# Patient Record
Sex: FEMALE | Race: WHITE | Employment: UNEMPLOYED | URBAN - METROPOLITAN AREA
[De-identification: names, ages, dates, MRNs, and addresses within clinical notes are randomized per-mention and may not be internally consistent; named-entity substitution may affect disease eponyms.]

---

## 2017-01-05 ENCOUNTER — TRANSFERRED RECORDS (OUTPATIENT)
Dept: HEALTH INFORMATION MANAGEMENT | Facility: CLINIC | Age: 47
End: 2017-01-05

## 2017-01-18 ENCOUNTER — OFFICE VISIT (OUTPATIENT)
Dept: FAMILY MEDICINE | Facility: CLINIC | Age: 47
End: 2017-01-18
Payer: COMMERCIAL

## 2017-01-18 ENCOUNTER — TELEPHONE (OUTPATIENT)
Dept: FAMILY MEDICINE | Facility: CLINIC | Age: 47
End: 2017-01-18

## 2017-01-18 VITALS
HEIGHT: 68 IN | WEIGHT: 167.2 LBS | TEMPERATURE: 96.3 F | SYSTOLIC BLOOD PRESSURE: 120 MMHG | DIASTOLIC BLOOD PRESSURE: 70 MMHG | BODY MASS INDEX: 25.34 KG/M2 | HEART RATE: 67 BPM | OXYGEN SATURATION: 97 %

## 2017-01-18 DIAGNOSIS — B00.1 RECURRENT COLD SORES: ICD-10-CM

## 2017-01-18 DIAGNOSIS — J02.9 SORE THROAT: ICD-10-CM

## 2017-01-18 DIAGNOSIS — R20.2 PARESTHESIAS: ICD-10-CM

## 2017-01-18 DIAGNOSIS — R68.2 DRY MOUTH, UNSPECIFIED: ICD-10-CM

## 2017-01-18 DIAGNOSIS — F33.1 MAJOR DEPRESSIVE DISORDER, RECURRENT EPISODE, MODERATE (H): Primary | ICD-10-CM

## 2017-01-18 DIAGNOSIS — F33.1 MAJOR DEPRESSIVE DISORDER, RECURRENT EPISODE, MODERATE (H): ICD-10-CM

## 2017-01-18 DIAGNOSIS — F90.9 ATTENTION DEFICIT HYPERACTIVITY DISORDER (ADHD), UNSPECIFIED ADHD TYPE: Primary | ICD-10-CM

## 2017-01-18 LAB
DEPRECATED S PYO AG THROAT QL EIA: NORMAL
MICRO REPORT STATUS: NORMAL
SPECIMEN SOURCE: NORMAL

## 2017-01-18 PROCEDURE — 87081 CULTURE SCREEN ONLY: CPT | Performed by: FAMILY MEDICINE

## 2017-01-18 PROCEDURE — 87880 STREP A ASSAY W/OPTIC: CPT | Performed by: FAMILY MEDICINE

## 2017-01-18 PROCEDURE — 99214 OFFICE O/P EST MOD 30 MIN: CPT | Performed by: FAMILY MEDICINE

## 2017-01-18 RX ORDER — IBUPROFEN 800 MG/1
800 TABLET, FILM COATED ORAL EVERY 8 HOURS PRN
Qty: 30 TABLET | Refills: 1 | Status: CANCELLED | OUTPATIENT
Start: 2017-01-18

## 2017-01-18 RX ORDER — DEXTROAMPHETAMINE SACCHARATE, AMPHETAMINE ASPARTATE, DEXTROAMPHETAMINE SULFATE AND AMPHETAMINE SULFATE 5; 5; 5; 5 MG/1; MG/1; MG/1; MG/1
20 TABLET ORAL 2 TIMES DAILY
Qty: 60 TABLET | Refills: 0 | Status: SHIPPED | OUTPATIENT
Start: 2017-03-18 | End: 2017-04-17

## 2017-01-18 RX ORDER — GABAPENTIN 100 MG/1
200 CAPSULE ORAL 3 TIMES DAILY
Qty: 180 CAPSULE | Refills: 0 | Status: SHIPPED
Start: 2017-01-18 | End: 2017-10-11

## 2017-01-18 RX ORDER — DEXTROAMPHETAMINE SACCHARATE, AMPHETAMINE ASPARTATE, DEXTROAMPHETAMINE SULFATE AND AMPHETAMINE SULFATE 5; 5; 5; 5 MG/1; MG/1; MG/1; MG/1
20 TABLET ORAL 2 TIMES DAILY
Qty: 60 TABLET | Refills: 0 | Status: SHIPPED | OUTPATIENT
Start: 2017-02-18 | End: 2017-03-20

## 2017-01-18 RX ORDER — DEXTROAMPHETAMINE SACCHARATE, AMPHETAMINE ASPARTATE, DEXTROAMPHETAMINE SULFATE AND AMPHETAMINE SULFATE 5; 5; 5; 5 MG/1; MG/1; MG/1; MG/1
20 TABLET ORAL 2 TIMES DAILY
Qty: 60 TABLET | Refills: 0 | Status: SHIPPED | OUTPATIENT
Start: 2017-01-18 | End: 2017-02-17

## 2017-01-18 RX ORDER — BUPROPION HYDROCHLORIDE 150 MG/1
300 TABLET ORAL EVERY MORNING
Qty: 60 TABLET | Refills: 5 | Status: SHIPPED | OUTPATIENT
Start: 2017-01-18 | End: 2017-01-20

## 2017-01-18 RX ORDER — VALACYCLOVIR HYDROCHLORIDE 500 MG/1
500 TABLET, FILM COATED ORAL 2 TIMES DAILY
Qty: 18 TABLET | Refills: 3 | Status: SHIPPED | OUTPATIENT
Start: 2017-01-18 | End: 2017-11-14

## 2017-01-18 RX ORDER — GABAPENTIN 100 MG/1
CAPSULE ORAL
Qty: 540 CAPSULE | Refills: 0 | Status: CANCELLED | OUTPATIENT
Start: 2017-01-18

## 2017-01-18 NOTE — MR AVS SNAPSHOT
After Visit Summary   1/18/2017    Marlin Lopez    MRN: [id number]           Patient Information     Date Of Birth          1970        Visit Information        Provider Department      1/18/2017 10:40 AM Zach Corona MD [facility]        Today's Diagnoses     Attention deficit hyperactivity disorder (ADHD), unspecified ADHD type    -  1     Sore throat         Dry mouth, unspecified         Recurrent cold sores         Paresthesias-hands and feet         Visit for screening mammogram         Fibromyalgia         Chronic migraine         Chronic midline posterior neck pain           Care Instructions    [facility]    If you have any questions regarding to your visit please contact your care team:       Team Purple:   Clinic Hours Telephone Number   [address]   7am-7pm  Monday - Thursday   7am-5pm  Fridays  [phone]  (Appointment scheduling available 24/7)    Questions about your Visit?   Team Line:  [phone]   Urgent Care - [place] [facility] and [facility] - 11am-9pm Monday-Friday Saturday-Sunday- 9am-5pm   [place] - 5pm-9pm Monday-Friday Saturday-Sunday- 9am-5pm  [phone] - [place]   [phone] - [place]       What options do I have for visits at the clinic other than the traditional office visit?  To expand how we care for you, many of our providers are utilizing electronic visits (e-visits) and telephone visits, when medically appropriate, for interactions with their patients rather than a visit in the clinic.   We also offer nurse visits for many medical concerns. Just like any other service, we will bill your insurance company for this type of visit based on time spent on the phone with your provider. Not all insurance companies cover these visits. Please check with your medical insurance if this type of visit is covered. You will be responsible for any  "charges that are not paid by your insurance.      E-visits via [email or web address]:  generally incur a $35.00 fee.  Telephone visits:  Time spent on the phone: *charged based on time that is spent on the phone in increments of 10 minutes. Estimated cost:   5-10 mins $30.00   11-20 mins. $59.00   21-30 mins. $85.00     Use [email or web address] (secure email communication and access to your chart) to send your primary care provider a message or make an appointment. Ask someone on your Team how to sign up for LatamLeapt.  For a Price Quote for your services, please call our Stadius Line at [phone].  As always, Thank you for trusting us with your health care needs!    Discharged By: An          Follow-ups after your visit        Future tests that were ordered for you today     Open Future Orders        Priority Expected Expires Ordered    MA SCREENING DIGITAL BILAT - Future  (s+30) Routine  1/18/2018 1/18/2017            Who to contact     If you have questions or need follow up information about today's clinic visit or your schedule please contact [facility] directly at [phone].  Normal or non-critical lab and imaging results will be communicated to you by MyChart, letter or phone within 4 business days after the clinic has received the results. If you do not hear from us within 7 days, please contact the clinic through [email or web address] or phone. If you have a critical or abnormal lab result, we will notify you by phone as soon as possible.  Submit refill requests through Tyros or call your pharmacy and they will forward the refill request to us. Please allow 3 business days for your refill to be completed.          Additional Information About Your Visit        [email or web address] Information     Tyros lets you send messages to your doctor, view your test results, renew your prescriptions, schedule appointments and more. To sign up, go to [email or web address] . Click on \"Log in\" on the left side of the screen, which will take " "you to the Welcome page. Then click on \"Sign up Now\" on the right side of the page.     You will be asked to enter the access code listed below, as well as some personal information. Please follow the directions to create your username and password.     Your access code is: [email or web address]  Expires: 3/15/2017 12:48 PM     Your access code will  in 90 days. If you need help or a new code, please call your [place] clinic or [phone].        Care EveryWhere ID     This is your Care EveryWhere ID. This could be used by other organizations to access your [place] medical records  [id number]        Your Vitals Were     Pulse Temperature Height BMI (Body Mass Index) Pulse Oximetry Last Period    67 96.3  F (35.7  C) (Oral) 5' 8\" (1.727 m) 25.43 kg/m2 97% 2014       Blood Pressure from Last 3 Encounters:   17 120/70   16 109/71   12/15/16 123/80    Weight from Last 3 Encounters:   17 167 lb 3.2 oz (75.841 kg)   16 167 lb 1.7 oz (75.8 kg)   12/15/16 171 lb (77.565 kg)              We Performed the Following     Strep, Rapid Screen          Today's Medication Changes          These changes are accurate as of: 17 11:08 AM.  If you have any questions, ask your nurse or doctor.               These medicines have changed or have updated prescriptions.        Dose/Directions    * amphetamine-dextroamphetamine 20 MG per tablet   Commonly known as:  ADDERALL   This may have changed:  Another medication with the same name was added. Make sure you understand how and when to take each.   Used for:  Attention deficit hyperactivity disorder (ADHD), unspecified ADHD type   Changed by:  Zach Corona MD        Dose:  20 mg   Take 1 tablet (20 mg) by mouth 2 times daily   Quantity:  60 tablet   Refills:  0       * amphetamine-dextroamphetamine 20 MG per tablet   Commonly known as:  ADDERALL   This may have changed:  Another medication with the same name was added. Make sure you understand " how and when to take each.   Used for:  Attention deficit hyperactivity disorder (ADHD), unspecified ADHD type   Changed by:  Zach Corona MD        Dose:  20 mg   Take 1 tablet (20 mg) by mouth 2 times daily   Quantity:  60 tablet   Refills:  0       * amphetamine-dextroamphetamine 20 MG per tablet   Commonly known as:  ADDERALL   This may have changed:  You were already taking a medication with the same name, and this prescription was added. Make sure you understand how and when to take each.   Used for:  Attention deficit hyperactivity disorder (ADHD), unspecified ADHD type   Changed by:  Zach Corona MD        Dose:  20 mg   Take 1 tablet (20 mg) by mouth 2 times daily   Quantity:  60 tablet   Refills:  0       * amphetamine-dextroamphetamine 20 MG per tablet   Commonly known as:  ADDERALL   This may have changed:  You were already taking a medication with the same name, and this prescription was added. Make sure you understand how and when to take each.   Used for:  Attention deficit hyperactivity disorder (ADHD), unspecified ADHD type   Changed by:  Zach Corona MD        Dose:  20 mg   Start taking on:  2/18/2017   Take 1 tablet (20 mg) by mouth 2 times daily   Quantity:  60 tablet   Refills:  0       * amphetamine-dextroamphetamine 20 MG per tablet   Commonly known as:  ADDERALL   This may have changed:  You were already taking a medication with the same name, and this prescription was added. Make sure you understand how and when to take each.   Used for:  Attention deficit hyperactivity disorder (ADHD), unspecified ADHD type   Changed by:  Zach Corona MD        Dose:  20 mg   Start taking on:  3/18/2017   Take 1 tablet (20 mg) by mouth 2 times daily   Quantity:  60 tablet   Refills:  0       gabapentin 100 MG capsule   Commonly known as:  NEURONTIN   This may have changed:    - how much to take  - how to take this  - when to take this   Used for:  Chronic migraine,  Chronic midline posterior neck pain, Paresthesias   Changed by:  Zach Corona MD        Dose:  200 mg   Take 2 capsules (200 mg) by mouth 3 times daily Start week 1: 100 mg three times/day. Week 2: 100 mg morning and lunch, 200 mg bed time. Wk 3 and afterwards: 200 mg three times a day   Quantity:  180 capsule   Refills:  0       * Notice:  This list has 5 medication(s) that are the same as other medications prescribed for you. Read the directions carefully, and ask your doctor or other care provider to review them with you.         Where to get your medicines      These medications were sent to [id number] - [address]   [address] [place] [address]     Phone:  [phone]    - buPROPion 150 MG 24 hr tablet  - gabapentin 100 MG capsule  - valACYclovir 500 MG tablet      Some of these will need a paper prescription and others can be bought over the counter.  Ask your nurse if you have questions.     Bring a paper prescription for each of these medications    - amphetamine-dextroamphetamine 20 MG per tablet  - amphetamine-dextroamphetamine 20 MG per tablet  - amphetamine-dextroamphetamine 20 MG per tablet             Primary Care Provider Office Phone # Fax #    Zach Corona [phone]       [address] [address]        Thank you!     Thank you for choosing [facility]  for your care. Our goal is always to provide you with excellent care. Hearing back from our patients is one way we can continue to improve our services. Please take a few minutes to complete the written survey that you may receive in the mail after your visit with us. Thank you!             Your Updated Medication List - Protect others around you: Learn how to safely use, store and throw away your medicines at www.disposemymeds.org.          This list is accurate as of: 1/18/17 11:08 AM.  Always  use your most recent med list.                   Brand Name Dispense Instructions for use    * amphetamine-dextroamphetamine 20 MG per tablet    ADDERALL    60 tablet    Take 1 tablet (20 mg) by mouth 2 times daily       * amphetamine-dextroamphetamine 20 MG per tablet    ADDERALL    60 tablet    Take 1 tablet (20 mg) by mouth 2 times daily       * amphetamine-dextroamphetamine 20 MG per tablet    ADDERALL    60 tablet    Take 1 tablet (20 mg) by mouth 2 times daily       * amphetamine-dextroamphetamine 20 MG per tablet   Start taking on:  2/18/2017    ADDERALL    60 tablet    Take 1 tablet (20 mg) by mouth 2 times daily       * amphetamine-dextroamphetamine 20 MG per tablet   Start taking on:  3/18/2017    ADDERALL    60 tablet    Take 1 tablet (20 mg) by mouth 2 times daily       aspirin 325 MG EC tablet      Take  by mouth daily.       buPROPion 150 MG 24 hr tablet    WELLBUTRIN XL    60 tablet    Take 2 tablets (300 mg) by mouth every morning       fexofenadine 180 MG tablet    ALLEGRA    30 tablet    Take 1 tablet (180 mg) by mouth daily       gabapentin 100 MG capsule    NEURONTIN    180 capsule    Take 2 capsules (200 mg) by mouth 3 times daily Start week 1: 100 mg three times/day. Week 2: 100 mg morning and lunch, 200 mg bed time. Wk 3 and afterwards: 200 mg three times a day       * ibuprofen 800 MG tablet    ADVIL/MOTRIN    30 tablet    TAKE 1 TABLET(800 MG) BY MOUTH EVERY 8 HOURS AS NEEDED FOR MODERATE PAIN       * ibuprofen 800 MG tablet    ADVIL/MOTRIN    30 tablet    Take 1 tablet (800 mg) by mouth every 8 hours as needed for moderate pain       ketotifen 0.025 % Soln ophthalmic solution    ZADITOR    1 Bottle    Place 1 drop into both eyes 2 times daily       meclizine 25 MG tablet    ANTIVERT    15 tablet    Take 1 tablet (25 mg) by mouth 4 times daily as needed for dizziness       metroNIDAZOLE 500 MG tablet    FLAGYL    14 tablet    Take 1 tablet (500 mg) by mouth 2 times daily       nicotine 21  MG/24HR 24 hr patch    NICODERM CQ    30 patch    Place 1 patch onto the skin every 24 hours       ondansetron 4 MG ODT tab    ZOFRAN ODT    10 tablet    Take 1 tablet (4 mg) by mouth every 6 hours as needed for nausea       oxyCODONE-acetaminophen 5-325 MG per tablet    PERCOCET    15 tablet    Take 1 tablet by mouth every 4 hours as needed for pain       polyethylene glycol powder    MIRALAX    510 g    Take 17 g (1 capful) by mouth daily       valACYclovir 500 MG tablet    VALTREX    18 tablet    Take 1 tablet (500 mg) by mouth 2 times daily       * Notice:  This list has 7 medication(s) that are the same as other medications prescribed for you. Read the directions carefully, and ask your doctor or other care provider to review them with you.

## 2017-01-18 NOTE — NURSING NOTE
"Chief Complaint   Patient presents with     Recheck Medication     Throat Pain     sore throat only at night, hard to swallow x 1 week       Initial /70 mmHg  Pulse 67  Temp(Src) 96.3  F (35.7  C) (Oral)  Ht 5' 8\" (1.727 m)  Wt 167 lb 3.2 oz (75.841 kg)  BMI 25.43 kg/m2  SpO2 97%  LMP 01/03/2014 Estimated body mass index is 25.43 kg/(m^2) as calculated from the following:    Height as of this encounter: 5' 8\" (1.727 m).    Weight as of this encounter: 167 lb 3.2 oz (75.841 kg).  BP completed using cuff size: ondina Germain MA    "

## 2017-01-18 NOTE — Clinical Note
[facility]    01/18/2017    Patient: Marlin Lopez  YOB: 1970  Medical Record Number: [id number]    Controlled Substance Agreement  I understand that my care provider has prescribed controlled substances (narcotics, tranquilizers, and/or stimulants) to help manage my condition(s).  I am taking this medicine to help me function or work.  I know that this is strong medicine.  It could have serious side effects and even cause a dependency on the drug.  If I stop these medicines suddenly, I could have severe withdrawal symptoms.    The risks, benefits, and side effects of these medication(s) were explained to me.  I agree that:  1. I will take part in other treatments as advised by my provider.  This may be psychiatry or counseling, physical therapy, behavioral therapy, group treatment, or a referral to a pain clinic.  I will reduce or stop my medicine when my provider tells me to do so.   2. I will take my medicines as prescribed.  I will not change the dose or schedule unless my provider tells me to.  There will be no refills if I  run out early.   I may be contacted at any time without warning and asked to complete a drug test or pill count.   3. I will keep all my appointments at the clinic.  If I miss appointments or fail to follow instructions, my provider may stop my medicine.  4. I will not ask other providers to prescribe controlled substances. And I will not accept controlled substances from other people. If I need another prescribed controlled substance for a new reason, I will notify my provider within one business day.  5. If I enroll in the [place] Medical Marijuana program, I will tell my provider.  I will also sign an agreement to share my medical records with my provider.  6. I will use one pharmacy to fill all of my controlled substance prescriptions.  If my prescription is mailed to my pharmacy, it may take 5 to 7 days for my medicine to be ready.  7. I understand  that my provider, clinic care team, and pharmacy can track controlled substance prescriptions from other providers through a central database (prescription monitoring program).  8. I will bring in my list of medications (or my medicine bottles) each time I come to the clinic.  Page 1 of 2      [facility]    01/18/2017    Patient: Marlin Lopez  YOB: 1970  Medical Record Number: [id number]    9. Refills of controlled substances will be made only during office hours.  It is up to me to make sure that I do not run out of my medicines on weekends or holidays.    10. I am responsible for my prescriptions.  If the medicine is lost or stolen, it will not be replaced.   I also agree not to share these medicines with anyone.  11. I agree to not use ANY illegal or recreational drugs.  This includes marijuana, cocaine, bath salts or other drugs.  I agree not to use alcohol unless my provider says I may.  I agree to give urine samples whenever asked.  If I fail to give a urine sample, the provider may stop my medicine.     12. I will tell my nurse or provider right away if I become pregnant or have a new medical problem treated outside of [facility].  13. I understand that this medicine can affect my thinking and judgment.  It may be unsafe for me to drive, use machinery and do dangerous tasks.  I will not do any of these things until I know how the medicine affects me.  If my dose changes, I will wait to see how it affects me.  I will contact my provider if I have concerns about medicine side effects.  I understand that if I do not follow any of the conditions above, my prescriptions or treatment may be stopped.    I agree that my provider, clinic care team, and pharmacy may work with any city, state or federal law enforcement agency that investigates the misuse, sale, or other diversion of my controlled medicine. I will allow my provider to discuss my care with or share a copy of this  agreement with any other treating provider, pharmacy or emergency room where I receive care.  I agree to give up (waive) any right of privacy or confidentiality with respect to these authorizations.   I have read this agreement and have asked questions about anything I did not understand.   ___________________________________    ___________________________  Patient Signature                                                             Date and Time  ___________________________________     ____________________________  Witness                                                                              Date and Time  ___________________________________  Zach Corona MD  Page 2 of 2

## 2017-01-18 NOTE — PROGRESS NOTES
SUBJECTIVE:                                                    Marlin Lopez is a 47 year old female who presents to clinic today for the following health issues:    Patient presents with:  Recheck Medication  Throat Pain: sore throat only at night, hard to swallow x 1 week    ADHD:  Medication Refill, symptoms well controlled and not experiencing any side effects from the medication.    Sore throat:   Throat hurts at night, no PND, with lots of coughing. No sinus congestion. Coughs up some yellowish [phlegm sometimes through out the day. Allergies to pollen. Throat and mouth feels dry.     Recurrent Herpes:   Takes valtrex and preventing recurrences.    Fibromyalgia:   She was started on gabapentin and was to follow up. Needs short term refill.    Problem list and histories reviewed & adjusted, as indicated.  Additional history: as documented    Patient Active Problem List   Diagnosis     Anxiety state     Vaginal discharge     Herpes simplex     Fibromyalgia     Sleep problems     Recurrent cold sores     Moderate major depression (H)     ADHD (attention deficit hyperactivity disorder)     CARDIOVASCULAR SCREENING; LDL GOAL LESS THAN 130     Cigarette smoker     Family history of colon cancer     Allergic rhinitis     Renal cyst, left     Past Surgical History   Procedure Laterality Date     Tonsillectomy  1979     C removal of ovary(s)  1992     Left, cyst       Social History   Substance Use Topics     Smoking status: Current Every Day Smoker -- 0.50 packs/day     Types: Cigarettes     Start date: 01/01/1980     Smokeless tobacco: Never Used      Comment:  using e-cig daily     Alcohol Use: 0.0 oz/week     0 Standard drinks or equivalent per week      Comment:  drinks 3 days in a week, 2 beers each time      Family History   Problem Relation Age of Onset     Hypertension Maternal Grandmother      CEREBROVASCULAR DISEASE Maternal Grandmother      Alzheimer Disease Maternal Grandmother      Arthritis  "Maternal Grandmother      Obesity Maternal Grandmother      Cancer - colorectal Paternal Grandfather      CANCER Other      Colon     Breast Cancer Other          ROS:  Constitutional, HEENT, cardiovascular, pulmonary, gi and gu systems are negative, except as otherwise noted.    OBJECTIVE:                                                    /70 mmHg  Pulse 67  Temp(Src) 96.3  F (35.7  C) (Oral)  Ht 5' 8\" (1.727 m)  Wt 167 lb 3.2 oz (75.841 kg)  BMI 25.43 kg/m2  SpO2 97%  LMP 01/03/2014  Body mass index is 25.43 kg/(m^2).  GENERAL: healthy, alert and no distress  EYES: Eyes grossly normal to inspection, PERRL and conjunctivae and sclerae normal  HENT: ear canals and TM's normal, nose and mouth without ulcers or lesions  NECK: no adenopathy and thyroid normal to palpation  RESP: lungs clear to auscultation - no rales, rhonchi or wheezes  CV: regular rate and rhythm,  no murmur, click or rub, no peripheral edema   MS: no gross musculoskeletal defects noted, no edema  PSYCH: Alert, coherent, normal speech, affect dragging kind  Diagnostic Test Results:  none      ASSESSMENT/PLAN:                                                    (F90.9) Attention deficit hyperactivity disorder (ADHD), unspecified ADHD type  (primary encounter diagnosis)  Comment: Refill medications  Plan: amphetamine-dextroamphetamine (ADDERALL) 20 MG         per tablet, amphetamine-dextroamphetamine         (ADDERALL) 20 MG per tablet,         amphetamine-dextroamphetamine (ADDERALL) 20 MG         per tablet    (F33.1) Major depressive disorder, recurrent episode, moderate (H)  Comment: Doing well on Wellbutrin  Plan: : buPROPion (WELLBUTRIN XL) 150 MG 24 hr tablet    Tiny(J02.9) Sore throat  Comment: Rapid strep negative. Encouraged good hydration and discussed signs/symptoms of dehydration. OTC analgesic and saline gargles recommended.    Will contact with results of culture when available.  Recheck if not improving as expected.  Plan: " Strep, Rapid Screen, Beta strep group A culturen    (R68.2) Dry mouth, unspecified  Comment: possible medication related, or humidity  Plan: Try humidifier    (B00.1) Recurrent cold sores  Comment: Refill  Plan: valACYclovir (VALTREX) 500 MG tablet    (R20.2) Paresthesias-hands and feet  Comment: Short term refill, follow up with pain clinic  Plan: gabapentin (NEURONTIN) 100 MG capsule    Follow up in 3 months or sooner with concerns    Zach Corona MD  [facility]

## 2017-01-18 NOTE — PATIENT INSTRUCTIONS
[facility]    If you have any questions regarding to your visit please contact your care team:       Team Purple:   Clinic Hours Telephone Number   [address]   7am-7pm  Monday - Thursday   7am-5pm  Fridays  [phone]  (Appointment scheduling available 24/7)    Questions about your Visit?   Team Line:  [phone]   Urgent Care - [place] and [facility] - 11am-9pm Monday-Friday Saturday-Sunday- 9am-5pm   [place] - 5pm-9pm Monday-Friday Saturday-Sunday- 9am-5pm  [phone] - [facility]  [phone] - [place]       What options do I have for visits at the clinic other than the traditional office visit?  To expand how we care for you, many of our providers are utilizing electronic visits (e-visits) and telephone visits, when medically appropriate, for interactions with their patients rather than a visit in the clinic.   We also offer nurse visits for many medical concerns. Just like any other service, we will bill your insurance company for this type of visit based on time spent on the phone with your provider. Not all insurance companies cover these visits. Please check with your medical insurance if this type of visit is covered. You will be responsible for any charges that are not paid by your insurance.      E-visits via NovoDynamicst:  generally incur a $35.00 fee.  Telephone visits:  Time spent on the phone: *charged based on time that is spent on the phone in increments of 10 minutes. Estimated cost:   5-10 mins $30.00   11-20 mins. $59.00   21-30 mins. $85.00     Use NovoDynamicst (secure email communication and access to your chart) to send your primary care provider a message or make an appointment. Ask someone on your Team how to sign up for CMGE.  For a Price Quote for your services, please call our Consumer Price Line at [phone].  As always, Thank you for trusting us with your health care  needs!    Discharged By: An

## 2017-01-19 NOTE — TELEPHONE ENCOUNTER
Received fax from pharmacy stating patient requires Prior Authorization for Bupropion XL 150mg    Insurance information:  Name:   Phone number: [phone]  ID number: [id number]    Provider to address. Message route to Dr. Corona. Initiate prior authorization or change medication?   Insurance will only cover for 1 tablet per day.    Please advise.  Stacy Germain MA      **If a prior authorization is to be initiated, please list the following:    -Any medications the patient has tried and failed or any contraindications. **    -Is the patient currently on this medication, or has tried before? **    -What is the diagnosis? **    - Justification or other information that me by helpful. **

## 2017-01-20 LAB
BACTERIA SPEC CULT: NORMAL
MICRO REPORT STATUS: NORMAL
SPECIMEN SOURCE: NORMAL

## 2017-01-20 RX ORDER — BUPROPION HYDROCHLORIDE 300 MG/1
300 TABLET ORAL EVERY MORNING
Qty: 30 TABLET | Refills: 3 | Status: SHIPPED | OUTPATIENT
Start: 2017-01-20 | End: 2017-01-25 | Stop reason: ALTCHOICE

## 2017-01-23 NOTE — TELEPHONE ENCOUNTER
Patient notified of providers message as written.  Patient verbalized understanding, no further questions or concerns.    Ashley Rodriguez RN

## 2017-01-24 ENCOUNTER — TELEPHONE (OUTPATIENT)
Dept: FAMILY MEDICINE | Facility: CLINIC | Age: 47
End: 2017-01-24

## 2017-01-24 DIAGNOSIS — F33.1 MAJOR DEPRESSIVE DISORDER, RECURRENT EPISODE, MODERATE (H): Primary | ICD-10-CM

## 2017-01-24 NOTE — TELEPHONE ENCOUNTER
Received fax from pharmacy stating patient requires Prior Authorization for Bupropion XL     Insurance information:  Name:   Phone number: [phone]  ID number: [id number]    Provider to address. Message route to Dr. Corona. Initiate prior authorization or change medication?  Please advise.      **If a prior authorization is to be initiated, please list the following:    -Any medications the patient has tried and failed or any contraindications. **    -Is the patient currently on this medication, or has tried before? **    -What is the diagnosis? **    - Justification or other information that me by helpful. **

## 2017-01-25 RX ORDER — BUPROPION HYDROCHLORIDE 200 MG/1
200 TABLET, EXTENDED RELEASE ORAL 2 TIMES DAILY
Qty: 60 TABLET | Refills: 1 | Status: SHIPPED | OUTPATIENT
Start: 2017-01-25 | End: 2017-05-24 | Stop reason: ALTCHOICE

## 2017-04-26 ENCOUNTER — TRANSFERRED RECORDS (OUTPATIENT)
Dept: HEALTH INFORMATION MANAGEMENT | Facility: CLINIC | Age: 47
End: 2017-04-26

## 2017-05-18 NOTE — PROGRESS NOTES
SUBJECTIVE:                                                    Marlin Lopez is a 47 year old female who presents to clinic today for the following health issues:    Medication Followup of Adderall    Taking Medication as prescribed: yes    Side Effects:  None    Medication Helping Symptoms:  yes     Vaginal Symptoms   Has a scanty discharge.      Duration: 2 weeks    Description  vaginal discharge and urine odor    Intensity:  moderate    Accompanying signs and symptoms (fever/dysuria/abdominal or back pain): None    History  Sexually active: yes, single partner, contraception - condoms  Possibility of pregnancy: No  Recent antibiotic use: no     Precipitating or alleviating factors: None    Therapies tried and outcome: none       Depression/Anxiety:       Depression and Anxiety Follow-Up    Status since last visit: Worsened due to pain    Other associated symptoms:None    Complicating factors:     Significant life event: Yes-  Pain     Current substance abuse: None    PHQ-9 SCORE 5/20/2016 12/15/2016 5/23/2017   Total Score - - -   Total Score 6 19 14     MARY BETH-7 SCORE 5/20/2016 12/15/2016 5/23/2017   Total Score - - -   Total Score 12 15 15        PHQ-9  English      PHQ-9   Any Language     GAD7     Migraine:     Still bothers her on and off     Uses ibuprofen and gives some relieve.    Memory    Starting to get concerned with memory; she has to write down things and feels light headed and foggy sometimes, feels like is not all there.    Cervical Pain:   Had MRI saw neurology and did some.    Body Pain/Back Pain   From neck down the spine. Been to pain clinic (212) recommended pool therapy.   Has a lot of tightness in the back with stretching and possibly massage    Problem list and histories reviewed & adjusted, as indicated.  Additional history: as documented    Patient Active Problem List   Diagnosis     Anxiety state     Vaginal discharge     Herpes simplex     Fibromyalgia     Sleep problems      "Recurrent cold sores     Moderate major depression (H)     ADHD (attention deficit hyperactivity disorder)     CARDIOVASCULAR SCREENING; LDL GOAL LESS THAN 130     Cigarette smoker     Family history of colon cancer     Allergic rhinitis     Renal cyst, left     Past Surgical History:   Procedure Laterality Date     C REMOVAL OF OVARY(S)  1992    Left, cyst     TONSILLECTOMY  1979       Social History   Substance Use Topics     Smoking status: Current Every Day Smoker     Packs/day: 0.50     Types: Cigarettes     Start date: 1/1/1980     Smokeless tobacco: Never Used      Comment:  using e-cig daily     Alcohol use 0.0 oz/week     0 Standard drinks or equivalent per week      Comment:  drinks 3 days in a week, 2 beers each time      Family History   Problem Relation Age of Onset     Hypertension Maternal Grandmother      CEREBROVASCULAR DISEASE Maternal Grandmother      Alzheimer Disease Maternal Grandmother      Arthritis Maternal Grandmother      Obesity Maternal Grandmother      CANCER Other      Colon     Breast Cancer Other      Cancer - colorectal Paternal Grandfather            Reviewed and updated as needed this visit by clinical staff  Allergies       Reviewed and updated as needed this visit by Provider         ROS:  Constitutional, HEENT, cardiovascular, pulmonary and gi systems are negative, except as otherwise noted.    OBJECTIVE:                                                    /62 (BP Location: Right arm, Patient Position: Chair, Cuff Size: Adult Regular)  Pulse 88  Temp 97.1  F (36.2  C) (Oral)  Ht 5' 8\" (1.727 m)  Wt 164 lb (74.4 kg)  LMP 01/03/2014  SpO2 98%  BMI 24.94 kg/m2  Body mass index is 24.94 kg/(m^2).  GENERAL: healthy, alert and no distress  HENT: ear canals and TM's normal, nose and mouth without ulcers or lesions  NECK: no adenopathy and thyroid normal to palpation  RESP: lungs clear to auscultation - no rales, rhonchi or wheezes  CV: regular rate and rhythm, no murmur, " click or rub, no peripheral edema  MS: no gross musculoskeletal defects noted, no edema  SKIN: no suspicious lesions or rashes  NEURO: Normal strength and tone, mentation intact and speech normal  PSYCH: mentation appears normal, affect normal/bright    Diagnostic Test Results:      Results for orders placed or performed in visit on 05/23/17   UA reflex to Microscopic and Culture   Result Value Ref Range    Color Urine Yellow     Appearance Urine Clear     Glucose Urine Negative NEG mg/dL    Bilirubin Urine Negative NEG    Ketones Urine Negative NEG mg/dL    Specific Gravity Urine 1.015 1.003 - 1.035    Blood Urine Trace (A) NEG    pH Urine 6.5 5.0 - 7.0 pH    Protein Albumin Urine Negative NEG mg/dL    Urobilinogen Urine 0.2 0.2 - 1.0 EU/dL    Nitrite Urine Negative NEG    Leukocyte Esterase Urine Negative NEG    Source Midstream Urine    Urine Microscopic   Result Value Ref Range    WBC Urine O - 2 0 - 2 /HPF    RBC Urine O - 2 0 - 2 /HPF    Squamous Epithelial /LPF Urine Few FEW /LPF   Wet prep   Result Value Ref Range    Specimen Description Vagina     Wet Prep       No Trichomonas seen  No clue cells seen  No yeast seen  (Note)  MANY BACTERIA SEEN.  SELF COLLECT.      Micro Report Status FINAL 05/23/2017           ASSESSMENT/PLAN:                                                      (F90.9) Attention deficit hyperactivity disorder (ADHD), unspecified ADHD type  Comment: Symptoms fairly well controlled  Plan: amphetamine-dextroamphetamine (ADDERALL) 20 MG         per tablet, amphetamine-dextroamphetamine         (ADDERALL) 20 MG per tablet,         amphetamine-dextroamphetamine (ADDERALL) 20 MG         per tablet    (F33.1) Major depressive disorder, recurrent episode, moderate (H)  Comment: PHQ 9 score 14. Slightly improved from previous. Will switch to Zoloft  Plan: sertraline (ZOLOFT) 100 MG tablet    (F41.1) MARY BETH (generalized anxiety disorder)  Comment: Thinks Wellbutrin is not helping. Will try Zoloft  Plan:  sertraline (ZOLOFT) 100 MG tablet    (R82.90) Bad odor of urine  (primary encounter diagnosis)  Comment: UA normal  Plan: UA reflex to Microscopic and Culture, Urine         Microscopic    (M54.2) Neck pain  Plan: PT    (M54.9,  G89.29) Chronic bilateral back pain, unspecified back location  Comment: Discussed doing PT, would like to do it near home  Plan: Will get a Location for PT and notify us    (F17.200) Tobacco use disorder  Plan: TOBACCO CESSATION ORDER FOR     (B00.1) Recurrent cold sores  Plan: acyclovir (ZOVIRAX) 5 % cream    (N89.8) Vaginal discharge  Comment: Normal wet prep  Plan: Wet prep    Follow up in 1 month or sooner with concerns    Zach Corona MD  [facility]

## 2017-05-23 ENCOUNTER — RADIANT APPOINTMENT (OUTPATIENT)
Dept: MAMMOGRAPHY | Facility: CLINIC | Age: 47
End: 2017-05-23
Attending: FAMILY MEDICINE
Payer: COMMERCIAL

## 2017-05-23 ENCOUNTER — OFFICE VISIT (OUTPATIENT)
Dept: FAMILY MEDICINE | Facility: CLINIC | Age: 47
End: 2017-05-23
Payer: COMMERCIAL

## 2017-05-23 VITALS
TEMPERATURE: 97.1 F | HEIGHT: 68 IN | OXYGEN SATURATION: 98 % | DIASTOLIC BLOOD PRESSURE: 62 MMHG | HEART RATE: 88 BPM | BODY MASS INDEX: 24.86 KG/M2 | SYSTOLIC BLOOD PRESSURE: 110 MMHG | WEIGHT: 164 LBS

## 2017-05-23 DIAGNOSIS — B00.1 RECURRENT COLD SORES: ICD-10-CM

## 2017-05-23 DIAGNOSIS — Z12.31 VISIT FOR SCREENING MAMMOGRAM: ICD-10-CM

## 2017-05-23 DIAGNOSIS — R82.90 BAD ODOR OF URINE: ICD-10-CM

## 2017-05-23 DIAGNOSIS — F90.9 ATTENTION DEFICIT HYPERACTIVITY DISORDER (ADHD), UNSPECIFIED ADHD TYPE: Primary | ICD-10-CM

## 2017-05-23 DIAGNOSIS — F41.1 GAD (GENERALIZED ANXIETY DISORDER): ICD-10-CM

## 2017-05-23 DIAGNOSIS — N89.8 VAGINAL DISCHARGE: ICD-10-CM

## 2017-05-23 DIAGNOSIS — F17.200 TOBACCO USE DISORDER: ICD-10-CM

## 2017-05-23 DIAGNOSIS — F33.1 MAJOR DEPRESSIVE DISORDER, RECURRENT EPISODE, MODERATE (H): ICD-10-CM

## 2017-05-23 DIAGNOSIS — M54.2 NECK PAIN: ICD-10-CM

## 2017-05-23 DIAGNOSIS — M54.9 CHRONIC BILATERAL BACK PAIN, UNSPECIFIED BACK LOCATION: ICD-10-CM

## 2017-05-23 DIAGNOSIS — G89.29 CHRONIC BILATERAL BACK PAIN, UNSPECIFIED BACK LOCATION: ICD-10-CM

## 2017-05-23 LAB
ALBUMIN UR-MCNC: NEGATIVE MG/DL
APPEARANCE UR: CLEAR
BILIRUB UR QL STRIP: NEGATIVE
COLOR UR AUTO: YELLOW
GLUCOSE UR STRIP-MCNC: NEGATIVE MG/DL
HGB UR QL STRIP: ABNORMAL
KETONES UR STRIP-MCNC: NEGATIVE MG/DL
LEUKOCYTE ESTERASE UR QL STRIP: NEGATIVE
MICRO REPORT STATUS: NORMAL
NITRATE UR QL: NEGATIVE
NON-SQ EPI CELLS #/AREA URNS LPF: NORMAL /LPF
PH UR STRIP: 6.5 PH (ref 5–7)
RBC #/AREA URNS AUTO: NORMAL /HPF (ref 0–2)
SP GR UR STRIP: 1.01 (ref 1–1.03)
SPECIMEN SOURCE: NORMAL
URN SPEC COLLECT METH UR: ABNORMAL
UROBILINOGEN UR STRIP-ACNC: 0.2 EU/DL (ref 0.2–1)
WBC #/AREA URNS AUTO: NORMAL /HPF (ref 0–2)
WET PREP SPEC: NORMAL

## 2017-05-23 PROCEDURE — 81001 URINALYSIS AUTO W/SCOPE: CPT | Performed by: FAMILY MEDICINE

## 2017-05-23 PROCEDURE — 87210 SMEAR WET MOUNT SALINE/INK: CPT | Performed by: FAMILY MEDICINE

## 2017-05-23 PROCEDURE — G0202 SCR MAMMO BI INCL CAD: HCPCS | Mod: TC

## 2017-05-23 PROCEDURE — 99214 OFFICE O/P EST MOD 30 MIN: CPT | Performed by: FAMILY MEDICINE

## 2017-05-23 RX ORDER — DEXTROAMPHETAMINE SACCHARATE, AMPHETAMINE ASPARTATE, DEXTROAMPHETAMINE SULFATE AND AMPHETAMINE SULFATE 5; 5; 5; 5 MG/1; MG/1; MG/1; MG/1
20 TABLET ORAL 2 TIMES DAILY
Qty: 60 TABLET | Refills: 0 | Status: SHIPPED | OUTPATIENT
Start: 2017-05-23 | End: 2017-06-22

## 2017-05-23 RX ORDER — DEXTROAMPHETAMINE SACCHARATE, AMPHETAMINE ASPARTATE, DEXTROAMPHETAMINE SULFATE AND AMPHETAMINE SULFATE 5; 5; 5; 5 MG/1; MG/1; MG/1; MG/1
20 TABLET ORAL 2 TIMES DAILY
Qty: 60 TABLET | Refills: 0 | Status: SHIPPED | OUTPATIENT
Start: 2017-06-23 | End: 2017-07-23

## 2017-05-23 RX ORDER — ACYCLOVIR 50 MG/G
CREAM TOPICAL
Qty: 5 G | Refills: 0 | Status: SHIPPED | OUTPATIENT
Start: 2017-05-23 | End: 2017-05-26 | Stop reason: ALTCHOICE

## 2017-05-23 RX ORDER — DEXTROAMPHETAMINE SACCHARATE, AMPHETAMINE ASPARTATE, DEXTROAMPHETAMINE SULFATE AND AMPHETAMINE SULFATE 5; 5; 5; 5 MG/1; MG/1; MG/1; MG/1
20 TABLET ORAL 2 TIMES DAILY
Qty: 60 TABLET | Refills: 0 | Status: SHIPPED | OUTPATIENT
Start: 2017-07-23 | End: 2017-08-14

## 2017-05-23 RX ORDER — SERTRALINE HYDROCHLORIDE 100 MG/1
TABLET, FILM COATED ORAL
Qty: 30 TABLET | Refills: 2 | Status: SHIPPED | OUTPATIENT
Start: 2017-05-23 | End: 2017-09-20

## 2017-05-23 ASSESSMENT — ANXIETY QUESTIONNAIRES
GAD7 TOTAL SCORE: 15
1. FEELING NERVOUS, ANXIOUS, OR ON EDGE: MORE THAN HALF THE DAYS
5. BEING SO RESTLESS THAT IT IS HARD TO SIT STILL: NOT AT ALL
3. WORRYING TOO MUCH ABOUT DIFFERENT THINGS: NEARLY EVERY DAY
2. NOT BEING ABLE TO STOP OR CONTROL WORRYING: NEARLY EVERY DAY
6. BECOMING EASILY ANNOYED OR IRRITABLE: NEARLY EVERY DAY
IF YOU CHECKED OFF ANY PROBLEMS ON THIS QUESTIONNAIRE, HOW DIFFICULT HAVE THESE PROBLEMS MADE IT FOR YOU TO DO YOUR WORK, TAKE CARE OF THINGS AT HOME, OR GET ALONG WITH OTHER PEOPLE: NOT DIFFICULT AT ALL
7. FEELING AFRAID AS IF SOMETHING AWFUL MIGHT HAPPEN: NEARLY EVERY DAY

## 2017-05-23 ASSESSMENT — PATIENT HEALTH QUESTIONNAIRE - PHQ9: 5. POOR APPETITE OR OVEREATING: SEVERAL DAYS

## 2017-05-23 NOTE — MR AVS SNAPSHOT
After Visit Summary   5/23/2017    Marlin Lopez    MRN: [id number]           Patient Information     Date Of Birth          1970        Visit Information        Provider Department      5/23/2017 11:20 AM Zach Corona MD [facility]        Today's Diagnoses     Bad odor of urine    -  1    Neck pain        Chronic bilateral back pain, unspecified back location        Visit for screening mammogram        Tobacco use disorder        Recurrent cold sores        Vaginal discharge        Attention deficit hyperactivity disorder (ADHD), unspecified ADHD type        MARY BETH (generalized anxiety disorder)          Care Instructions    [facility]    If you have any questions regarding to your visit please contact your care team:       Team Purple:   Clinic Hours Telephone Number   [address]   7am-7pm  Monday - Thursday   7am-5pm  Fridays  [phone]  (Appointment scheduling available 24/7)    Questions about your Visit?   Team Line:  [phone]   Urgent Care - [place] [facility] and [place] [facility] - 11am-9pm Monday-Friday Saturday-Sunday- 9am-5pm   [place] - 5pm-9pm Monday-Friday Saturday-Sunday- 9am-5pm  [phone] - [place]   [phone] - [place]       What options do I have for visits at the clinic other than the traditional office visit?  To expand how we care for you, many of our providers are utilizing electronic visits (e-visits) and telephone visits, when medically appropriate, for interactions with their patients rather than a visit in the clinic.   We also offer nurse visits for many medical concerns. Just like any other service, we will bill your insurance company for this type of visit based on time spent on the phone with your provider. Not all insurance companies cover these visits. Please check with your medical insurance if this type of visit is covered. You will be  "responsible for any charges that are not paid by your insurance.      E-visits via Occasion[employer]:  generally incur a $35.00 fee.  Telephone visits:  Time spent on the phone: *charged based on time that is spent on the phone in increments of 10 minutes. Estimated cost:   5-10 mins $30.00   11-20 mins. $59.00   21-30 mins. $85.00     Use Occasionhart (secure email communication and access to your chart) to send your primary care provider a message or make an appointment. Ask someone on your Team how to sign up for [employer].  For a Price Quote for your services, please call our [employer] Line at [phone].  As always, Thank you for trusting us with your health care needs!  Discharged by Mavis La MA.            Follow-ups after your visit        Who to contact     If you have questions or need follow up information about today's clinic visit or your schedule please contact [facility] directly at [phone].  Normal or non-critical lab and imaging results will be communicated to you by Occasionhart, letter or phone within 4 business days after the clinic has received the results. If you do not hear from us within 7 days, please contact the clinic through Occasionhart or phone. If you have a critical or abnormal lab result, we will notify you by phone as soon as possible.  Submit refill requests through [employer] or call your pharmacy and they will forward the refill request to us. Please allow 3 business days for your refill to be completed.          Additional Information About Your Visit        [employer] Information     [employer] lets you send messages to your doctor, view your test results, renew your prescriptions, schedule appointments and more. To sign up, go to [email or web address] . Click on \"Log in\" on the left side of the screen, which will take you to the Welcome page. Then click on \"Sign up Now\" on the right side of the page.     You will be asked to enter the access code listed below, as well as some " "personal information. Please follow the directions to create your username and password.     Your access code is: [email or web address]  Expires: 2017  8:02 AM     Your access code will  in 90 days. If you need help or a new code, please call your [facility] clinic or [phone].        Care EveryWhere ID     This is your Care EveryWhere ID. This could be used by other organizations to access your [facility] medical records  [id number]        Your Vitals Were     Pulse Temperature Height Last Period Pulse Oximetry BMI (Body Mass Index)    88 97.1  F (36.2  C) (Oral) 5' 8\" (1.727 m) 2014 98% 24.94 kg/m2       Blood Pressure from Last 3 Encounters:   17 110/62   17 120/70   16 109/71    Weight from Last 3 Encounters:   17 164 lb (74.4 kg)   17 167 lb 3.2 oz (75.8 kg)   16 167 lb 1.7 oz (75.8 kg)              We Performed the Following     TOBACCO CESSATION ORDER FOR      UA reflex to Microscopic and Culture     Wet prep          Today's Medication Changes          These changes are accurate as of: 17 11:58 AM.  If you have any questions, ask your nurse or doctor.               Start taking these medicines.        Dose/Directions    acyclovir 5 % cream   Commonly known as:  ZOVIRAX   Used for:  Recurrent cold sores   Started by:  Zach Corona MD        APPLY TOPICALLY TO THE AFFECTED AREA 5 TIMES DAILY   Quantity:  5 g   Refills:  0       sertraline 100 MG tablet   Commonly known as:  ZOLOFT   Used for:  MARY BETH (generalized anxiety disorder)   Started by:  Zach Corona MD        Start with 0.5tablet(50 mg) for 1 week then 1 tablet (100 mg) daily   Quantity:  30 tablet   Refills:  2         These medicines have changed or have updated prescriptions.        Dose/Directions    * amphetamine-dextroamphetamine 20 MG per tablet   Commonly known as:  ADDERALL   This may have changed:  Another medication with the same name was added. Make sure you understand " how and when to take each.   Used for:  Attention deficit hyperactivity disorder (ADHD), unspecified ADHD type   Changed by:  Zach Corona MD        Dose:  20 mg   Take 1 tablet (20 mg) by mouth 2 times daily   Quantity:  60 tablet   Refills:  0       * amphetamine-dextroamphetamine 20 MG per tablet   Commonly known as:  ADDERALL   This may have changed:  You were already taking a medication with the same name, and this prescription was added. Make sure you understand how and when to take each.   Used for:  Attention deficit hyperactivity disorder (ADHD), unspecified ADHD type   Changed by:  Zahc Corona MD        Dose:  20 mg   Take 1 tablet (20 mg) by mouth 2 times daily   Quantity:  60 tablet   Refills:  0       * amphetamine-dextroamphetamine 20 MG per tablet   Commonly known as:  ADDERALL   This may have changed:  You were already taking a medication with the same name, and this prescription was added. Make sure you understand how and when to take each.   Used for:  Attention deficit hyperactivity disorder (ADHD), unspecified ADHD type   Changed by:  Zach Corona MD        Dose:  20 mg   Start taking on:  6/23/2017   Take 1 tablet (20 mg) by mouth 2 times daily   Quantity:  60 tablet   Refills:  0       * amphetamine-dextroamphetamine 20 MG per tablet   Commonly known as:  ADDERALL   This may have changed:  You were already taking a medication with the same name, and this prescription was added. Make sure you understand how and when to take each.   Used for:  Attention deficit hyperactivity disorder (ADHD), unspecified ADHD type   Changed by:  Zach Corona MD        Dose:  20 mg   Start taking on:  7/23/2017   Take 1 tablet (20 mg) by mouth 2 times daily   Quantity:  60 tablet   Refills:  0       * Notice:  This list has 4 medication(s) that are the same as other medications prescribed for you. Read the directions carefully, and ask your doctor or other care provider to  review them with you.         Where to get your medicines      Some of these will need a paper prescription and others can be bought over the counter.  Ask your nurse if you have questions.     Bring a paper prescription for each of these medications     acyclovir 5 % cream    amphetamine-dextroamphetamine 20 MG per tablet    amphetamine-dextroamphetamine 20 MG per tablet    amphetamine-dextroamphetamine 20 MG per tablet    sertraline 100 MG tablet                Primary Care Provider Office Phone # Fax #    Zach Corona -[phone]       [address] [address]        Thank you!     Thank you for choosing [facility]  for your care. Our goal is always to provide you with excellent care. Hearing back from our patients is one way we can continue to improve our services. Please take a few minutes to complete the written survey that you may receive in the mail after your visit with us. Thank you!             Your Updated Medication List - Protect others around you: Learn how to safely use, store and throw away your medicines at www.disposemymeds.org.          This list is accurate as of: 5/23/17 11:58 AM.  Always use your most recent med list.                   Brand Name Dispense Instructions for use    acyclovir 5 % cream    ZOVIRAX    5 g    APPLY TOPICALLY TO THE AFFECTED AREA 5 TIMES DAILY       * amphetamine-dextroamphetamine 20 MG per tablet    ADDERALL    60 tablet    Take 1 tablet (20 mg) by mouth 2 times daily       * amphetamine-dextroamphetamine 20 MG per tablet    ADDERALL    60 tablet    Take 1 tablet (20 mg) by mouth 2 times daily       * amphetamine-dextroamphetamine 20 MG per tablet   Start taking on:  6/23/2017    ADDERALL    60 tablet    Take 1 tablet (20 mg) by mouth 2 times daily       * amphetamine-dextroamphetamine 20 MG per tablet   Start taking on:  7/23/2017    ADDERALL    60 tablet    Take 1 tablet (20 mg) by mouth 2  times daily       aspirin 325 MG EC tablet      Take  by mouth daily.       buPROPion 200 MG 12 hr tablet    WELLBUTRIN SR    60 tablet    Take 1 tablet (200 mg) by mouth 2 times daily       fexofenadine 180 MG tablet    ALLEGRA    30 tablet    Take 1 tablet (180 mg) by mouth daily       gabapentin 100 MG capsule    NEURONTIN    180 capsule    Take 2 capsules (200 mg) by mouth 3 times daily Start week 1: 100 mg three times/day. Week 2: 100 mg morning and lunch, 200 mg bed time. Wk 3 and afterwards: 200 mg three times a day       ibuprofen 800 MG tablet    ADVIL/MOTRIN    30 tablet    TAKE 1 TABLET(800 MG) BY MOUTH EVERY 8 HOURS AS NEEDED FOR MODERATE PAIN       ketotifen 0.025 % Soln ophthalmic solution    ZADITOR    1 Bottle    Place 1 drop into both eyes 2 times daily       nicotine 21 MG/24HR 24 hr patch    NICODERM CQ    30 patch    Place 1 patch onto the skin every 24 hours       polyethylene glycol powder    MIRALAX    510 g    Take 17 g (1 capful) by mouth daily       sertraline 100 MG tablet    ZOLOFT    30 tablet    Start with 0.5tablet(50 mg) for 1 week then 1 tablet (100 mg) daily       valACYclovir 500 MG tablet    VALTREX    18 tablet    Take 1 tablet (500 mg) by mouth 2 times daily       * Notice:  This list has 4 medication(s) that are the same as other medications prescribed for you. Read the directions carefully, and ask your doctor or other care provider to review them with you.

## 2017-05-23 NOTE — PATIENT INSTRUCTIONS
[facility]    If you have any questions regarding to your visit please contact your care team:       Team Purple:   Clinic Hours Telephone Number   [address]   7am-7pm  Monday - Thursday   7am-5pm  Fridays  [phone]  (Appointment scheduling available 24/7)    Questions about your Visit?   Team Line:  [phone]   Urgent Care - [place] and [facility] - 11am-9pm Monday-Friday Saturday-Sunday- 9am-5pm   [place] - 5pm-9pm Monday-Friday Saturday-Sunday- 9am-5pm  [phone] - [facility]  [phone] - [place]       What options do I have for visits at the clinic other than the traditional office visit?  To expand how we care for you, many of our providers are utilizing electronic visits (e-visits) and telephone visits, when medically appropriate, for interactions with their patients rather than a visit in the clinic.   We also offer nurse visits for many medical concerns. Just like any other service, we will bill your insurance company for this type of visit based on time spent on the phone with your provider. Not all insurance companies cover these visits. Please check with your medical insurance if this type of visit is covered. You will be responsible for any charges that are not paid by your insurance.      E-visits via [employer]:  generally incur a $35.00 fee.  Telephone visits:  Time spent on the phone: *charged based on time that is spent on the phone in increments of 10 minutes. Estimated cost:   5-10 mins $30.00   11-20 mins. $59.00   21-30 mins. $85.00     Use [employer] (secure email communication and access to your chart) to send your primary care provider a message or make an appointment. Ask someone on your Team how to sign up for [employer].  For a Price Quote for your services, please call our Consumer Price Line at [phone].  As always, Thank you for trusting us with your health care  needs!  Discharged by Mavis La MA.

## 2017-05-24 ASSESSMENT — PATIENT HEALTH QUESTIONNAIRE - PHQ9: SUM OF ALL RESPONSES TO PHQ QUESTIONS 1-9: 14

## 2017-05-24 ASSESSMENT — ANXIETY QUESTIONNAIRES: GAD7 TOTAL SCORE: 15

## 2017-05-26 ENCOUNTER — TELEPHONE (OUTPATIENT)
Dept: FAMILY MEDICINE | Facility: CLINIC | Age: 47
End: 2017-05-26

## 2017-05-26 DIAGNOSIS — B00.9 RECURRENT HERPES SIMPLEX: Primary | ICD-10-CM

## 2017-05-26 RX ORDER — ACYCLOVIR 400 MG/1
400 TABLET ORAL 3 TIMES DAILY
Qty: 15 TABLET | Refills: 1 | Status: SHIPPED | OUTPATIENT
Start: 2017-05-26 | End: 2017-11-14

## 2017-05-26 NOTE — TELEPHONE ENCOUNTER
Received fax from pharmacy stating patient requires Prior Authorization for Acyclovir 5% ointment 15gm .     Insurance information:   Name: [employer]   Phone number: [phone]  ID number: [id number]    Initiate prior authorization or change medication?    If a prior authorization is to be initiated, please list the following:    -any medications the patient has tried and failed or any contraindications.  -is the patient currently on this medication, or has tried before?  -What is the diagnosis?  -Justification or other information that may be helpful.

## 2017-06-06 ENCOUNTER — TELEPHONE (OUTPATIENT)
Dept: FAMILY MEDICINE | Facility: CLINIC | Age: 47
End: 2017-06-06

## 2017-06-06 NOTE — TELEPHONE ENCOUNTER
Reason for Call:  Other call back    Detailed comments: patient would like for the referral regarding physical therapy to be faxed to following number  [facility], if any questions please the patient to discuss further    Phone Number Patient can be reached at: Home number on file [phone] (home)    Best Time: today    Can we leave a detailed message on this number? YES    Call taken on 6/6/2017 at 3:01 PM by Phil Mckinney

## 2017-07-10 ENCOUNTER — TELEPHONE (OUTPATIENT)
Dept: FAMILY MEDICINE | Facility: CLINIC | Age: 47
End: 2017-07-10

## 2017-07-10 DIAGNOSIS — M54.2 NECK PAIN: Primary | ICD-10-CM

## 2017-07-10 NOTE — TELEPHONE ENCOUNTER
[facility] is requesting new order for Physical therapy for patient, the old order is .   Referral teed up please advise   Ashley Rodriguez RN

## 2017-07-14 ENCOUNTER — OFFICE VISIT (OUTPATIENT)
Dept: FAMILY MEDICINE | Facility: CLINIC | Age: 47
End: 2017-07-14
Payer: COMMERCIAL

## 2017-07-14 VITALS
HEIGHT: 68 IN | HEART RATE: 71 BPM | TEMPERATURE: 97.2 F | OXYGEN SATURATION: 99 % | WEIGHT: 166.8 LBS | DIASTOLIC BLOOD PRESSURE: 81 MMHG | BODY MASS INDEX: 25.28 KG/M2 | SYSTOLIC BLOOD PRESSURE: 121 MMHG

## 2017-07-14 DIAGNOSIS — N76.0 BV (BACTERIAL VAGINOSIS): ICD-10-CM

## 2017-07-14 DIAGNOSIS — K57.30 DIVERTICULOSIS OF LARGE INTESTINE WITHOUT HEMORRHAGE: ICD-10-CM

## 2017-07-14 DIAGNOSIS — K59.09 CHRONIC CONSTIPATION: ICD-10-CM

## 2017-07-14 DIAGNOSIS — N89.8 VAGINAL DISCHARGE: Primary | ICD-10-CM

## 2017-07-14 DIAGNOSIS — R07.89 CHEST WALL PAIN: ICD-10-CM

## 2017-07-14 DIAGNOSIS — B96.89 BV (BACTERIAL VAGINOSIS): ICD-10-CM

## 2017-07-14 DIAGNOSIS — H57.9 ITCHY EYES: ICD-10-CM

## 2017-07-14 DIAGNOSIS — B00.1 HERPES LABIALIS: ICD-10-CM

## 2017-07-14 LAB
ALBUMIN UR-MCNC: NEGATIVE MG/DL
APPEARANCE UR: CLEAR
BILIRUB UR QL STRIP: NEGATIVE
COLOR UR AUTO: YELLOW
GLUCOSE UR STRIP-MCNC: NEGATIVE MG/DL
HGB UR QL STRIP: ABNORMAL
KETONES UR STRIP-MCNC: NEGATIVE MG/DL
LEUKOCYTE ESTERASE UR QL STRIP: NEGATIVE
MICRO REPORT STATUS: ABNORMAL
NITRATE UR QL: NEGATIVE
NON-SQ EPI CELLS #/AREA URNS LPF: NORMAL /LPF
PH UR STRIP: 5.5 PH (ref 5–7)
RBC #/AREA URNS AUTO: NORMAL /HPF (ref 0–2)
SP GR UR STRIP: <=1.005 (ref 1–1.03)
SPECIMEN SOURCE: ABNORMAL
URN SPEC COLLECT METH UR: ABNORMAL
UROBILINOGEN UR STRIP-ACNC: 0.2 EU/DL (ref 0.2–1)
WBC #/AREA URNS AUTO: NORMAL /HPF (ref 0–2)
WET PREP SPEC: ABNORMAL

## 2017-07-14 PROCEDURE — 87591 N.GONORRHOEAE DNA AMP PROB: CPT | Performed by: FAMILY MEDICINE

## 2017-07-14 PROCEDURE — 81001 URINALYSIS AUTO W/SCOPE: CPT | Performed by: FAMILY MEDICINE

## 2017-07-14 PROCEDURE — 87210 SMEAR WET MOUNT SALINE/INK: CPT | Performed by: FAMILY MEDICINE

## 2017-07-14 PROCEDURE — 87491 CHLMYD TRACH DNA AMP PROBE: CPT | Performed by: FAMILY MEDICINE

## 2017-07-14 PROCEDURE — 99214 OFFICE O/P EST MOD 30 MIN: CPT | Performed by: FAMILY MEDICINE

## 2017-07-14 RX ORDER — ACYCLOVIR 50 MG/G
CREAM TOPICAL
Qty: 5 G | Refills: 3 | Status: SHIPPED | OUTPATIENT
Start: 2017-07-14 | End: 2017-08-14

## 2017-07-14 RX ORDER — METRONIDAZOLE 500 MG/1
500 TABLET ORAL 2 TIMES DAILY
Qty: 14 TABLET | Refills: 0 | Status: SHIPPED | OUTPATIENT
Start: 2017-07-14 | End: 2017-07-21

## 2017-07-14 ASSESSMENT — PAIN SCALES - GENERAL: PAINLEVEL: NO PAIN (0)

## 2017-07-14 NOTE — MR AVS SNAPSHOT
"              After Visit Summary   2017    Marlin Lopez    MRN: [id number]           Patient Information     Date Of Birth          1970        Visit Information        Provider Department      2017 11:00 AM Myriam Tyler MD [facility]        Today's Diagnoses     Vaginal discharge    -  1    BV (bacterial vaginosis)        Chest wall pain        Itchy eyes        Herpes labialis        Chronic constipation        Diverticulosis of large intestine without hemorrhage           Follow-ups after your visit        Who to contact     If you have questions or need follow up information about today's clinic visit or your schedule please contact [facility] directly at [phone].  Normal or non-critical lab and imaging results will be communicated to you by MyChart, letter or phone within 4 business days after the clinic has received the results. If you do not hear from us within 7 days, please contact the clinic through [employer] or phone. If you have a critical or abnormal lab result, we will notify you by phone as soon as possible.  Submit refill requests through [employer] or call your pharmacy and they will forward the refill request to us. Please allow 3 business days for your refill to be completed.          Additional Information About Your Visit        MyChart Information     [employer] lets you send messages to your doctor, view your test results, renew your prescriptions, schedule appointments and more. To sign up, go to [email or web address] . Click on \"Log in\" on the left side of the screen, which will take you to the Welcome page. Then click on \"Sign up Now\" on the right side of the page.     You will be asked to enter the access code listed below, as well as some personal information. Please follow the directions to create your username and password.     Your access code is: [email or web address]  Expires: 2017  8:02 AM     Your access code will  in 90 days. If " "you need help or a new code, please call your [place] clinic or [phone].        Care EveryWhere ID     This is your Care EveryWhere ID. This could be used by other organizations to access your [place] medical records  [id number]        Your Vitals Were     Pulse Temperature Height Last Period Pulse Oximetry BMI (Body Mass Index)    71 97.2  F (36.2  C) (Oral) 5' 8\" (1.727 m) 01/03/2014 99% 25.36 kg/m2       Blood Pressure from Last 3 Encounters:   07/14/17 121/81   05/23/17 110/62   01/18/17 120/70    Weight from Last 3 Encounters:   07/14/17 166 lb 12.8 oz (75.7 kg)   05/23/17 164 lb (74.4 kg)   01/18/17 167 lb 3.2 oz (75.8 kg)              We Performed the Following     CHLAMYDIA TRACHOMATIS PCR     NEISSERIA GONORRHOEA PCR     UA reflex to Microscopic and Culture     Urine Microscopic     Wet prep     XR Chest 2 Views          Today's Medication Changes          These changes are accurate as of: 7/14/17 12:22 PM.  If you have any questions, ask your nurse or doctor.               Start taking these medicines.        Dose/Directions    acyclovir 5 % cream   Commonly known as:  ZOVIRAX   Used for:  Herpes labialis   Started by:  Myriam Tyler MD        Apply topically 5 times daily   Quantity:  5 g   Refills:  3       metroNIDAZOLE 500 MG tablet   Commonly known as:  FLAGYL   Used for:  BV (bacterial vaginosis)   Started by:  Myriam Tyler MD        Dose:  500 mg   Take 1 tablet (500 mg) by mouth 2 times daily for 7 days   Quantity:  14 tablet   Refills:  0            Where to get your medicines      These medications were sent to [facility] [id number]   [address] [place] [address]     Phone:  [phone]     acyclovir 5 % cream    ketotifen 0.025 % Soln ophthalmic solution    metroNIDAZOLE 500 MG tablet                Primary Care Provider Office Phone # Fax #    Zach Corona [phone]       [facility] " [facility] [address] [facility] [address]        Equal Access to Services     JIEDAOMN TONY : Hadii susie jones hadsaraho Sojadaali, waaxda luqadaha, qaybta kaalmada apoorvapierrenesha, charli gabriel zahraobed kenttrentrosario vasquez. So [facility] [phone].    ATENCIÓN: Si habla español, tiene a chavez disposición servicios gratuitos de asistencia lingüística. Llame al [phone].    We comply with applicable federal civil rights laws and [place] laws. We do not discriminate on the basis of race, color, national origin, age, disability sex, sexual orientation or gender identity.            Thank you!     Thank you for choosing [facility]  for your care. Our goal is always to provide you with excellent care. Hearing back from our patients is one way we can continue to improve our services. Please take a few minutes to complete the written survey that you may receive in the mail after your visit with us. Thank you!             Your Updated Medication List - Protect others around you: Learn how to safely use, store and throw away your medicines at www.disposemymeds.org.          This list is accurate as of: 7/14/17 12:22 PM.  Always use your most recent med list.                   Brand Name Dispense Instructions for use Diagnosis    acyclovir 400 MG tablet    ZOVIRAX    15 tablet    Take 1 tablet (400 mg) by mouth 3 times daily    Recurrent herpes simplex       acyclovir 5 % cream    ZOVIRAX    5 g    Apply topically 5 times daily    Herpes labialis       * amphetamine-dextroamphetamine 20 MG per tablet    ADDERALL    60 tablet    Take 1 tablet (20 mg) by mouth 2 times daily    Attention deficit hyperactivity disorder (ADHD), unspecified ADHD type       * amphetamine-dextroamphetamine 20 MG per tablet    ADDERALL    60 tablet    Take 1 tablet (20 mg) by mouth 2 times daily    Attention deficit hyperactivity disorder (ADHD), unspecified ADHD type       * amphetamine-dextroamphetamine 20 MG per tablet   Start taking on:   7/23/2017    ADDERALL    60 tablet    Take 1 tablet (20 mg) by mouth 2 times daily    Attention deficit hyperactivity disorder (ADHD), unspecified ADHD type       aspirin 325 MG EC tablet      Take  by mouth daily.        fexofenadine 180 MG tablet    ALLEGRA    30 tablet    Take 1 tablet (180 mg) by mouth daily    Seasonal allergic rhinitis       gabapentin 100 MG capsule    NEURONTIN    180 capsule    Take 2 capsules (200 mg) by mouth 3 times daily Start week 1: 100 mg three times/day. Week 2: 100 mg morning and lunch, 200 mg bed time. Wk 3 and afterwards: 200 mg three times a day    Paresthesias       ibuprofen 800 MG tablet    ADVIL/MOTRIN    30 tablet    TAKE 1 TABLET(800 MG) BY MOUTH EVERY 8 HOURS AS NEEDED FOR MODERATE PAIN    Tension headache       ketotifen 0.025 % Soln ophthalmic solution    ZADITOR    1 Bottle    Place 1 drop into both eyes 2 times daily    Itchy eyes       metroNIDAZOLE 500 MG tablet    FLAGYL    14 tablet    Take 1 tablet (500 mg) by mouth 2 times daily for 7 days    BV (bacterial vaginosis)       nicotine 21 MG/24HR 24 hr patch    NICODERM CQ    30 patch    Place 1 patch onto the skin every 24 hours    Nicotine withdrawal       polyethylene glycol powder    MIRALAX    510 g    Take 17 g (1 capful) by mouth daily    Flatulence, eructation, and gas pain       sertraline 100 MG tablet    ZOLOFT    30 tablet    Start with 0.5tablet(50 mg) for 1 week then 1 tablet (100 mg) daily    MARY BETH (generalized anxiety disorder), Major depressive disorder, recurrent episode, moderate (H)       valACYclovir 500 MG tablet    VALTREX    18 tablet    Take 1 tablet (500 mg) by mouth 2 times daily    Recurrent cold sores       * Notice:  This list has 3 medication(s) that are the same as other medications prescribed for you. Read the directions carefully, and ask your doctor or other care provider to review them with you.

## 2017-07-14 NOTE — NURSING NOTE
"Chief Complaint   Patient presents with     Vaginal Problem       Initial /81  Pulse 71  Temp 97.2  F (36.2  C) (Oral)  Ht 5' 8\" (1.727 m)  Wt 166 lb 12.8 oz (75.7 kg)  LMP 01/03/2014  SpO2 99%  BMI 25.36 kg/m2 Estimated body mass index is 25.36 kg/(m^2) as calculated from the following:    Height as of this encounter: 5' 8\" (1.727 m).    Weight as of this encounter: 166 lb 12.8 oz (75.7 kg).  Medication Reconciliation: complete       Stephanie Joshi CMA      "

## 2017-07-14 NOTE — PROGRESS NOTES
SUBJECTIVE:                                                    Marlin Lopez is a 47 year old female who presents to clinic today for the following health issues:      Vaginal Symptoms  Onset: Ongoing    Description:  Vaginal Discharge: creamy   Itching (Pruritis): no   Burning sensation:  no   Odor: YES    Accompanying Signs & Symptoms:  Pain with Urination: no   Abdominal Pain: mild   Fever: no     History:   Sexually active: YES  New Partner: no   Possibility of Pregnancy:  No    Precipitating factors:   Recent Antibiotic Use: no     Alleviating factors:  Unknown     Therapies Tried and outcome: none  Pt has chronic constipation  She had a colonoscopy which showed Diverticulosis  No Blood in stools  Pt says Off and On she gets Left chest wall pain which is Intermittent and Hurts To move  No pain Now  She is a smoker and doing E-cigs Now  No sob   No cough    Pt has Itchy eyes and wants Refill of Her medicines  Problem list and histories reviewed & adjusted, as indicated.  Additional history: as documented    Patient Active Problem List   Diagnosis     Anxiety state     Vaginal discharge     Herpes simplex     Fibromyalgia     Sleep problems     Recurrent cold sores     Moderate major depression (H)     ADHD (attention deficit hyperactivity disorder)     CARDIOVASCULAR SCREENING; LDL GOAL LESS THAN 130     Cigarette smoker     Family history of colon cancer     Allergic rhinitis     Renal cyst, left     Past Surgical History:   Procedure Laterality Date     C REMOVAL OF OVARY(S)  1992    Left, cyst     TONSILLECTOMY  1979       Social History   Substance Use Topics     Smoking status: Current Every Day Smoker     Packs/day: 0.50     Types: Cigarettes     Start date: 1/1/1980     Smokeless tobacco: Never Used      Comment:  using e-cig daily     Alcohol use 0.0 oz/week     0 Standard drinks or equivalent per week      Comment:  drinks 3 days in a week, 2 beers each time      Family History   Problem Relation  Age of Onset     Hypertension Maternal Grandmother      CEREBROVASCULAR DISEASE Maternal Grandmother      Alzheimer Disease Maternal Grandmother      Arthritis Maternal Grandmother      Obesity Maternal Grandmother      CANCER Other      Colon     Breast Cancer Other      Cancer - colorectal Paternal Grandfather          Current Outpatient Prescriptions   Medication Sig Dispense Refill     ketotifen (ZADITOR) 0.025 % SOLN ophthalmic solution Place 1 drop into both eyes 2 times daily 1 Bottle 1     acyclovir (ZOVIRAX) 5 % cream Apply topically 5 times daily 5 g 3     acyclovir (ZOVIRAX) 400 MG tablet Take 1 tablet (400 mg) by mouth 3 times daily 15 tablet 1     amphetamine-dextroamphetamine (ADDERALL) 20 MG per tablet Take 1 tablet (20 mg) by mouth 2 times daily 60 tablet 0     [START ON 7/23/2017] amphetamine-dextroamphetamine (ADDERALL) 20 MG per tablet Take 1 tablet (20 mg) by mouth 2 times daily 60 tablet 0     sertraline (ZOLOFT) 100 MG tablet Start with 0.5tablet(50 mg) for 1 week then 1 tablet (100 mg) daily 30 tablet 2     valACYclovir (VALTREX) 500 MG tablet Take 1 tablet (500 mg) by mouth 2 times daily 18 tablet 3     gabapentin (NEURONTIN) 100 MG capsule Take 2 capsules (200 mg) by mouth 3 times daily Start week 1: 100 mg three times/day. Week 2: 100 mg morning and lunch, 200 mg bed time. Wk 3 and afterwards: 200 mg three times a day 180 capsule 0     ibuprofen (ADVIL,MOTRIN) 800 MG tablet TAKE 1 TABLET(800 MG) BY MOUTH EVERY 8 HOURS AS NEEDED FOR MODERATE PAIN 30 tablet 0     fexofenadine (ALLEGRA) 180 MG tablet Take 1 tablet (180 mg) by mouth daily 30 tablet 1     polyethylene glycol (MIRALAX) powder Take 17 g (1 capful) by mouth daily 510 g 1     amphetamine-dextroamphetamine (ADDERALL) 20 MG tablet Take 1 tablet (20 mg) by mouth 2 times daily 60 tablet 0     nicotine (NICODERM CQ) 21 MG/24HR patch 2h hr Place 1 patch onto the skin every 24 hours 30 patch 4     aspirin 325 MG EC tablet Take  by mouth  "daily.       [DISCONTINUED] ketotifen (ZADITOR) 0.025 % SOLN Place 1 drop into both eyes 2 times daily 1 Bottle 1     Allergies   Allergen Reactions     Cats Itching     Eyes, Sneezy,     Codeine Nausea and Vomiting     Recent Labs   Lab Test  12/22/16   1258 12/19/16 06/29/16 05/20/16   1406  12/29/15   1453  08/04/15   1324   LDL   --    --    --    --    --   103*   --    HDL   --    --    --    --    --   78   --    TRIG   --    --    --    --    --   29   --    ALT  17   --   30   --   19   --    --    CR  0.76  0.72  0.64   < >   --    --    --    GFRESTIMATED  81  >60  >60   < >   --    --    --    GFRESTBLACK  >90   GFR Calc     --   >60   --    --    --    --    POTASSIUM  3.8  4.0  5.0   < >   --    --    --    TSH   --    --   1.28   --    --    --   0.85    < > = values in this interval not displayed.      BP Readings from Last 3 Encounters:   07/14/17 121/81   05/23/17 110/62   01/18/17 120/70    Wt Readings from Last 3 Encounters:   07/14/17 166 lb 12.8 oz (75.7 kg)   05/23/17 164 lb (74.4 kg)   01/18/17 167 lb 3.2 oz (75.8 kg)                  Labs reviewed in EPIC    Reviewed and updated as needed this visit by clinical staff  Tobacco  Allergies  Meds  Med Hx  Surg Hx  Fam Hx  Soc Hx      Reviewed and updated as needed this visit by Provider         ROS:  C: NEGATIVE for fever, chills, change in weight  E/M: NEGATIVE for ear, mouth and throat problems  R: NEGATIVE for significant cough or SOB  CV: NEGATIVE for chest pain, palpitations or peripheral edema  GI: NEGATIVE for nausea, abdominal pain, heartburn, or change in bowel habits  : postmenopause  MUSCULOSKELETAL: chest wall pain  PSYCHIATRIC: anxiety   as above    OBJECTIVE:     /81  Pulse 71  Temp 97.2  F (36.2  C) (Oral)  Ht 5' 8\" (1.727 m)  Wt 166 lb 12.8 oz (75.7 kg)  LMP 01/03/2014  SpO2 99%  BMI 25.36 kg/m2  Body mass index is 25.36 kg/(m^2).  GENERAL: healthy, alert and no distress  NECK: no " adenopathy, no asymmetry, masses, or scars and thyroid normal to palpation  RESP: lungs clear to auscultation - no rales, rhonchi or wheezes  CV: regular rate and rhythm, normal S1 S2, no S3 or S4, no murmur, click or rub, no peripheral edema and peripheral pulses strong  ABDOMEN: soft, nontender, no hepatosplenomegaly, no masses and bowel sounds normal  MS: no gross musculoskeletal defects noted, no edema  PSYCH: anxious    Diagnostic Test Results:  Results for orders placed or performed in visit on 07/14/17 (from the past 24 hour(s))   Wet prep   Result Value Ref Range    Specimen Description Vagina     Wet Prep (A)      No Trichomonas seen  Clue cells seen  No yeast seen      Micro Report Status FINAL 07/14/2017    UA reflex to Microscopic and Culture   Result Value Ref Range    Color Urine Yellow     Appearance Urine Clear     Glucose Urine Negative NEG mg/dL    Bilirubin Urine Negative NEG    Ketones Urine Negative NEG mg/dL    Specific Gravity Urine <=1.005 1.003 - 1.035    Blood Urine Trace (A) NEG    pH Urine 5.5 5.0 - 7.0 pH    Protein Albumin Urine Negative NEG mg/dL    Urobilinogen Urine 0.2 0.2 - 1.0 EU/dL    Nitrite Urine Negative NEG    Leukocyte Esterase Urine Negative NEG    Source Midstream Urine    Urine Microscopic   Result Value Ref Range    WBC Urine O - 2 0 - 2 /HPF    RBC Urine O - 2 0 - 2 /HPF    Squamous Epithelial /LPF Urine Few FEW /LPF       ASSESSMENT/PLAN:         Tobacco Cessation:   reports that she has been smoking Cigarettes.  She started smoking about 37 years ago. She has been smoking about 0.50 packs per day. She has never used smokeless tobacco.  Tobacco Cessation Action Plan: Information offered: Patient not interested at this time      1. Vaginal discharge    - Wet prep  - UA reflex to Microscopic and Culture  - NEISSERIA GONORRHOEA PCR  - CHLAMYDIA TRACHOMATIS PCR  - Urine Microscopic    2. BV (bacterial vaginosis)  SEE [facility] orders  The potential side effects of this  medication have been discussed with the patient.  Call if any significant problems with these are experienced.    - metroNIDAZOLE (FLAGYL) 500 MG tablet; Take 1 tablet (500 mg) by mouth 2 times daily for 7 days  Dispense: 14 tablet; Refill: 0  No alcohol with This med  3. Chest wall pain  Advised advil OTC  Follow up if not better  - XR Chest 2 Views    4. Itchy eyes    - ketotifen (ZADITOR) 0.025 % SOLN ophthalmic solution; Place 1 drop into both eyes 2 times daily  Dispense: 1 Bottle; Refill: 1    5. Herpes labialis  Pt wants Cream  - acyclovir (ZOVIRAX) 5 % cream; Apply topically 5 times daily  Dispense: 5 g; Refill: 3  For constipation and Diverticulosis advised increase Fiber in diet  Follow up if not better  Myriam Tyler MD  [facility]

## 2017-07-16 LAB
C TRACH DNA SPEC QL NAA+PROBE: NORMAL
N GONORRHOEA DNA SPEC QL NAA+PROBE: NORMAL
SPECIMEN SOURCE: NORMAL
SPECIMEN SOURCE: NORMAL

## 2017-07-19 ENCOUNTER — TRANSFERRED RECORDS (OUTPATIENT)
Dept: HEALTH INFORMATION MANAGEMENT | Facility: CLINIC | Age: 47
End: 2017-07-19

## 2017-07-20 ENCOUNTER — TELEPHONE (OUTPATIENT)
Dept: FAMILY MEDICINE | Facility: CLINIC | Age: 47
End: 2017-07-20

## 2017-07-20 NOTE — TELEPHONE ENCOUNTER
Received fax from pharmacy stating patient requires Prior Authorization for acyclovir (ZOVIRAX) 5 % cream    Insurance information:  Name:   Phone number: [phone]  ID number: [id number]    Provider to address. Message route to Dr. Tyler. Initiate prior authorization or change medication?  Please advise.      **If a prior authorization is to be initiated, please list the following:    -Any medications the patient has tried and failed or any contraindications. **    -Is the patient currently on this medication, or has tried before? **    -What is the diagnosis? **    - Justification or other information that me by helpful. **

## 2017-08-08 ENCOUNTER — TELEPHONE (OUTPATIENT)
Dept: FAMILY MEDICINE | Facility: CLINIC | Age: 47
End: 2017-08-08

## 2017-08-08 NOTE — TELEPHONE ENCOUNTER
Left breast pain; normal screening mammogram 3 months ago    We initiate the evaluation of breast pain with a thorough history and physical examination. Clinical judgment is used when deciding if imaging tests are necessary.   For women of any age with a suspicious clinical examination and/or history, a mammogram and targeted ultrasound are performed     We would typically start with a history and exam then a diagnostic mammogram and a targeted ultrasound. If she cannot come in then will proceed with mammo and Ultrasound.  Let me know her thoughts.

## 2017-08-08 NOTE — TELEPHONE ENCOUNTER
Reason for Call:  Other call back    Detailed comments: Patient states she has been getting and recurring sharp pain under her left breast, patient states she has something like this in the past but would like to request an referral to have an MRI completed if possible , please contact the patient to discuss further,     Phone Number Patient can be reached at: Home number on file [phone] (home) or Cell number on file:    Telephone Information:   Mobile [phone]       Best Time: today    Can we leave a detailed message on this number? YES    Call taken on 8/8/2017 at 8:49 AM by Phil Mckinney

## 2017-08-08 NOTE — TELEPHONE ENCOUNTER
Made appointment to see PCP on Monday. Patient is very concerned about ongoing pain.  Magui Vaz RN

## 2017-08-08 NOTE — PROGRESS NOTES
SUBJECTIVE:                                                    Marlin Lopez is a 47 year old female who presents to clinic today for the following health issues:    Patient presents with:  Pain: having pain under left breast ongoing     It is under the left breast.  Pain is recurrent for some years now. It is on and off. Unbearable when comes on and can last from a few hours to days. Usually occurs when she turns over   She also feels like has a little bump on the spot where there is pain.  She had a screening mammogram in 5/2017 which was normal but still worried about breast cancer.    Problem list and histories reviewed & adjusted, as indicated.  Additional history: as documented    Patient Active Problem List   Diagnosis     Anxiety state     Vaginal discharge     Herpes simplex     Fibromyalgia     Sleep problems     Recurrent cold sores     Moderate major depression (H)     ADHD (attention deficit hyperactivity disorder)     CARDIOVASCULAR SCREENING; LDL GOAL LESS THAN 130     Cigarette smoker     Family history of colon cancer     Allergic rhinitis     Renal cyst, left     Chronic constipation     Diverticulosis of large intestine without hemorrhage     Past Surgical History:   Procedure Laterality Date     C REMOVAL OF OVARY(S)  1992    Left, cyst     TONSILLECTOMY  1979       Social History   Substance Use Topics     Smoking status: Current Every Day Smoker     Packs/day: 0.50     Types: Cigarettes     Start date: 1/1/1980     Smokeless tobacco: Never Used      Comment:  using e-cig daily     Alcohol use 0.0 oz/week     0 Standard drinks or equivalent per week      Comment:  drinks 3 days in a week, 2 beers each time      Family History   Problem Relation Age of Onset     Hypertension Maternal Grandmother      CEREBROVASCULAR DISEASE Maternal Grandmother      Alzheimer Disease Maternal Grandmother      Arthritis Maternal Grandmother      Obesity Maternal Grandmother      CANCER Other      Colon      "Breast Cancer Other      Cancer - colorectal Paternal Grandfather              Reviewed and updated as needed this visit by clinical staff       Reviewed and updated as needed this visit by Provider       ROS:  Constitutional, HEENT, cardiovascular, pulmonary, gi and gu systems are negative, except as otherwise noted.      OBJECTIVE:   /70  Pulse 79  Temp 97.4  F (36.3  C) (Oral)  Ht 5' 8\" (1.727 m)  Wt 167 lb 8 oz (76 kg)  LMP 01/03/2014  SpO2 99%  BMI 25.47 kg/m2  Body mass index is 25.47 kg/(m^2).  GENERAL: healthy, alert and no distress  CHEST: Vague tenderness in the lower outer breast quadrant and underlying chest wall structures. No bumps felt  NECK: no adenopathy and thyroid normal to palpation  RESP: lungs clear to auscultation - no rales, rhonchi or wheezes  CV: regular rate and rhythm, no murmur, click or rub, no peripheral edema  ABDOMEN: soft, nontender, no masses and bowel sounds normal  MS: no gross musculoskeletal defects noted, no edema    Diagnostic Test Results:  none     ASSESSMENT/PLAN:     (R07.89) Chest wall pain  (primary encounter diagnosis)  Comment: Discussed the non specific nature of the symptoms, is at lower border of breast. Since has no lump and recent mammogram was negative this is most likely chest wall pain. Will do NSAID.  Plan: diclofenac (VOLTAREN) 75 MG EC tablet    (N64.4) Breast pain: Lt   Comment: Worried about breast cancer, discussed unlikeliness but will want to pursue a diagnostic mammogram.  Plan: MA Diagnostic Digital Left            Zach Corona MD  [facility]  "

## 2017-08-14 ENCOUNTER — OFFICE VISIT (OUTPATIENT)
Dept: FAMILY MEDICINE | Facility: CLINIC | Age: 47
End: 2017-08-14
Payer: COMMERCIAL

## 2017-08-14 VITALS
TEMPERATURE: 97.4 F | DIASTOLIC BLOOD PRESSURE: 70 MMHG | WEIGHT: 167.5 LBS | BODY MASS INDEX: 25.39 KG/M2 | HEIGHT: 68 IN | OXYGEN SATURATION: 99 % | HEART RATE: 79 BPM | SYSTOLIC BLOOD PRESSURE: 110 MMHG

## 2017-08-14 DIAGNOSIS — R07.89 CHEST WALL PAIN: Primary | ICD-10-CM

## 2017-08-14 DIAGNOSIS — N64.4 BREAST PAIN: ICD-10-CM

## 2017-08-14 PROCEDURE — 99213 OFFICE O/P EST LOW 20 MIN: CPT | Performed by: FAMILY MEDICINE

## 2017-08-14 RX ORDER — DICLOFENAC SODIUM 75 MG/1
75 TABLET, DELAYED RELEASE ORAL 2 TIMES DAILY PRN
Qty: 20 TABLET | Refills: 0 | Status: SHIPPED | OUTPATIENT
Start: 2017-08-14 | End: 2019-09-13 | Stop reason: ALTCHOICE

## 2017-08-14 NOTE — NURSING NOTE
"Chief Complaint   Patient presents with     Pain     having pain under left breast ongoing        Initial /70  Pulse 79  Temp 97.4  F (36.3  C) (Oral)  Ht 5' 8\" (1.727 m)  Wt 167 lb 8 oz (76 kg)  LMP 01/03/2014  SpO2 99%  BMI 25.47 kg/m2 Estimated body mass index is 25.47 kg/(m^2) as calculated from the following:    Height as of this encounter: 5' 8\" (1.727 m).    Weight as of this encounter: 167 lb 8 oz (76 kg).  Medication Reconciliation: complete     An LESVIA Germain    "

## 2017-08-14 NOTE — MR AVS SNAPSHOT
After Visit Summary   8/14/2017    Marlin Lopez    MRN: [id number]           Patient Information     Date Of Birth          1970        Visit Information        Provider Department      8/14/2017 11:00 AM Zach Corona MD [facility]        Today's Diagnoses     Chest wall pain    -  1    Breast pain: Lt           Care Instructions    [facility] (formerly [facility])    If you have any questions regarding to your visit please contact your care team:       Team Purple:   Clinic Hours Telephone Number   Dr. Milagros Forrest     7am-7pm  Monday - Thursday   7am-5pm  Fridays  [phone]  (Appointment scheduling available 24/7)    Questions about your Visit?   Team Line:  [phone]   Urgent Care - [place] and [facility] - 11am-9pm Monday-Friday Saturday-Sunday- 9am-5pm   [place] - 5pm-9pm Monday-Friday Saturday-Sunday- 9am-5pm  [phone] - [facility]  [phone] - [place]       What options do I have for visits at the clinic other than the traditional office visit?  To expand how we care for you, many of our providers are utilizing electronic visits (e-visits) and telephone visits, when medically appropriate, for interactions with their patients rather than a visit in the clinic.   We also offer nurse visits for many medical concerns. Just like any other service, we will bill your insurance company for this type of visit based on time spent on the phone with your provider. Not all insurance companies cover these visits. Please check with your medical insurance if this type of visit is covered. You will be responsible for any charges that are not paid by your insurance.      E-visits via [employer]:  generally incur a $35.00 fee.  Telephone visits:  Time spent on the phone: *charged based on time that is spent on the phone in increments of 10 minutes. Estimated cost:   5-10 mins $30.00   11-20 mins. $59.00   21-30  "mins. $85.00     Use [employer] (secure email communication and access to your chart) to send your primary care provider a message or make an appointment. Ask someone on your Team how to sign up for [employer].  For a Price Quote for your services, please call our Consumer Price Line at [phone].  As always, Thank you for trusting us with your health care needs!    Discharged By: An            Follow-ups after your visit        Future tests that were ordered for you today     Open Future Orders        Priority Expected Expires Ordered    MA Diagnostic Digital Left Routine  8/14/2018 8/14/2017            Who to contact     If you have questions or need follow up information about today's clinic visit or your schedule please contact [facility] directly at [phone].  Normal or non-critical lab and imaging results will be communicated to you by [employer], letter or phone within 4 business days after the clinic has received the results. If you do not hear from us within 7 days, please contact the clinic through [employer] or phone. If you have a critical or abnormal lab result, we will notify you by phone as soon as possible.  Submit refill requests through [employer] or call your pharmacy and they will forward the refill request to us. Please allow 3 business days for your refill to be completed.          Additional Information About Your Visit        ShieldEffect Information     ShieldEffect lets you send messages to your doctor, view your test results, renew your prescriptions, schedule appointments and more. To sign up, go to [email or web address] . Click on \"Log in\" on the left side of the screen, which will take you to the Welcome page. Then click on \"Sign up Now\" on the right side of the page.     You will be asked to enter the access code listed below, as well as some personal information. Please follow the directions to create your username and password.     Your access code is: [email or web address]  Expires: 11/12/2017 " "11:50 AM     Your access code will  in 90 days. If you need help or a new code, please call your [facility] clinic or [phone].        Care EveryWhere ID     This is your Care EveryWhere ID. This could be used by other organizations to access your [facility] medical records  [id number]        Your Vitals Were     Pulse Temperature Height Last Period Pulse Oximetry BMI (Body Mass Index)    79 97.4  F (36.3  C) (Oral) 5' 8\" (1.727 m) 2014 99% 25.47 kg/m2       Blood Pressure from Last 3 Encounters:   17 110/70   17 121/81   17 110/62    Weight from Last 3 Encounters:   17 167 lb 8 oz (76 kg)   17 166 lb 12.8 oz (75.7 kg)   17 164 lb (74.4 kg)                 Today's Medication Changes          These changes are accurate as of: 17 11:50 AM.  If you have any questions, ask your nurse or doctor.               Start taking these medicines.        Dose/Directions    diclofenac 75 MG EC tablet   Commonly known as:  VOLTAREN   Used for:  Chest wall pain   Started by:  Zach Corona MD        Dose:  75 mg   Take 1 tablet (75 mg) by mouth 2 times daily as needed for moderate pain   Quantity:  20 tablet   Refills:  0         Stop taking these medicines if you haven't already. Please contact your care team if you have questions.     acyclovir 5 % cream   Commonly known as:  ZOVIRAX   Stopped by:  Zach Corona MD                Where to get your medicines      These medications were sent to [id number]  [address]   [address] [place] [address]     Phone:  [phone]     diclofenac 75 MG EC tablet                Primary Care Provider Office Phone # Fax #    Zach Corona [phone]       [address] [place] [address] [place] [address]        Equal Access to Services     NAFISA JIMENEZ AH: chris Cisneros luqadaha, charli johnson " harvey guerinrex ace'aan ah. So [facility] [phone].    ATENCIÓN: Si mesfin marcial, tiene a chavez disposición servicios gratuitos de asistencia lingüística. Patrice al [phone].    We comply with applicable federal civil rights laws and [place] laws. We do not discriminate on the basis of race, color, national origin, age, disability sex, sexual orientation or gender identity.            Thank you!     Thank you for choosing [facility]  for your care. Our goal is always to provide you with excellent care. Hearing back from our patients is one way we can continue to improve our services. Please take a few minutes to complete the written survey that you may receive in the mail after your visit with us. Thank you!             Your Updated Medication List - Protect others around you: Learn how to safely use, store and throw away your medicines at www.disposemymeds.org.          This list is accurate as of: 8/14/17 11:50 AM.  Always use your most recent med list.                   Brand Name Dispense Instructions for use Diagnosis    acyclovir 400 MG tablet    ZOVIRAX    15 tablet    Take 1 tablet (400 mg) by mouth 3 times daily    Recurrent herpes simplex       amphetamine-dextroamphetamine 20 MG per tablet    ADDERALL    60 tablet    Take 1 tablet (20 mg) by mouth 2 times daily    Attention deficit hyperactivity disorder (ADHD), unspecified ADHD type       aspirin 325 MG EC tablet      Take  by mouth daily.        diclofenac 75 MG EC tablet    VOLTAREN    20 tablet    Take 1 tablet (75 mg) by mouth 2 times daily as needed for moderate pain    Chest wall pain       fexofenadine 180 MG tablet    ALLEGRA    30 tablet    Take 1 tablet (180 mg) by mouth daily    Seasonal allergic rhinitis       gabapentin 100 MG capsule    NEURONTIN    180 capsule    Take 2 capsules (200 mg) by mouth 3 times daily Start week 1: 100 mg three times/day. Week 2: 100 mg morning and lunch, 200 mg bed time. Wk 3 and  afterwards: 200 mg three times a day    Paresthesias       ibuprofen 800 MG tablet    ADVIL/MOTRIN    30 tablet    TAKE 1 TABLET(800 MG) BY MOUTH EVERY 8 HOURS AS NEEDED FOR MODERATE PAIN    Tension headache       ketotifen 0.025 % Soln ophthalmic solution    ZADITOR    1 Bottle    Place 1 drop into both eyes 2 times daily    Itchy eyes       nicotine 21 MG/24HR 24 hr patch    NICODERM CQ    30 patch    Place 1 patch onto the skin every 24 hours    Nicotine withdrawal       polyethylene glycol powder    MIRALAX    510 g    Take 17 g (1 capful) by mouth daily    Flatulence, eructation, and gas pain       sertraline 100 MG tablet    ZOLOFT    30 tablet    Start with 0.5tablet(50 mg) for 1 week then 1 tablet (100 mg) daily    MARY BETH (generalized anxiety disorder), Major depressive disorder, recurrent episode, moderate (H)       valACYclovir 500 MG tablet    VALTREX    18 tablet    Take 1 tablet (500 mg) by mouth 2 times daily    Recurrent cold sores

## 2017-08-14 NOTE — PATIENT INSTRUCTIONS
[facility]    If you have any questions regarding to your visit please contact your care team:       Team Purple:   Clinic Hours Telephone Number   Dr. Milagros Forrest     7am-7pm  Monday - Thursday   7am-5pm  Fridays  [phone]  (Appointment scheduling available 24/7)    Questions about your Visit?   Team Line:  [phone]   Urgent Care - [place] and [facility] - 11am-9pm Monday-Friday Saturday-Sunday- 9am-5pm   [place] - 5pm-9pm Monday-Friday Saturday-Sunday- 9am-5pm  [phone] - [facility]  [phone] - [place]       What options do I have for visits at the clinic other than the traditional office visit?  To expand how we care for you, many of our providers are utilizing electronic visits (e-visits) and telephone visits, when medically appropriate, for interactions with their patients rather than a visit in the clinic.   We also offer nurse visits for many medical concerns. Just like any other service, we will bill your insurance company for this type of visit based on time spent on the phone with your provider. Not all insurance companies cover these visits. Please check with your medical insurance if this type of visit is covered. You will be responsible for any charges that are not paid by your insurance.      E-visits via [employer]:  generally incur a $35.00 fee.  Telephone visits:  Time spent on the phone: *charged based on time that is spent on the phone in increments of 10 minutes. Estimated cost:   5-10 mins $30.00   11-20 mins. $59.00   21-30 mins. $85.00     Use [employer] (secure email communication and access to your chart) to send your primary care provider a message or make an appointment. Ask someone on your Team how to sign up for [employer].  For a Price Quote for your services, please call our Consumer Price Line at [phone].  As always, Thank you for trusting us with your health care needs!    Discharged By: An

## 2017-09-13 ENCOUNTER — TELEPHONE (OUTPATIENT)
Dept: FAMILY MEDICINE | Facility: CLINIC | Age: 47
End: 2017-09-13

## 2017-09-13 NOTE — TELEPHONE ENCOUNTER
Reason for call:  Patient reporting a symptom    Symptom or request: Head lice.    Duration (how long have symptoms been present): few weeks .    Have you been treated for this before? No    Additional comments: Patient is requesting a prescription for head lice to be sent to [facility] [phone]    Phone Number patient can be reached at:  Home number on file [phone] (home)    Best Time:  any    Can we leave a detailed message on this number:  YES    Call taken on 9/13/2017 at 8:55 AM by Netta Moss

## 2017-09-13 NOTE — TELEPHONE ENCOUNTER
Scheduled patient for phone visit today told her will get a call from provider between 2-4 and that a MA would call before that.  Sharon Geronimo,

## 2017-09-18 ENCOUNTER — TELEPHONE (OUTPATIENT)
Dept: FAMILY MEDICINE | Facility: CLINIC | Age: 47
End: 2017-09-18

## 2017-09-19 NOTE — PROGRESS NOTES
SUBJECTIVE:   Marlin Lopez is a 47 year old female who presents to clinic today for the following health issues:    Vaginal Symptoms  Onset: constant     Description:  Vaginal Discharge: white   Itching (Pruritis): no   Burning sensation:  no   Odor: YES- slight    Accompanying Signs & Symptoms:  Pain with Urination: no   Abdominal Pain: mild suprapubic  Fever: no     History:   Sexually active: YES  New Partner: no   Possibility of Pregnancy:  No    Precipitating factors:   Recent Antibiotic Use: no     Alleviating factors:  none  LMP  postmenopause  Therapies Tried and outcome: none      Acute Illness   Acute illness concerns: cough  Onset: 1 week    Fever: no    Chills/Sweats: YES    Headache (location?): YES    Sinus Pressure:YES    Conjunctivitis:  no    Ear Pain: YES: bilateral    Rhinorrhea: YES    Congestion: YES    Sore Throat: YES     Cough: YES-productive of yellow sputum, productive of green sputum    Wheeze: no    Decreased Appetite: YES    Nausea: YES    Vomiting: no    Diarrhea:  no    Dysuria/Freq.: YES- frequency    Fatigue/Achiness: YES    Sick/Strep Exposure: YES- grandchild       Therapies Tried and outcome: day quil / ny quil     Nonsmoker    Problem list and histories reviewed & adjusted, as indicated.  Additional history: as documented    Patient Active Problem List   Diagnosis     Anxiety state     Vaginal discharge     Herpes simplex     Fibromyalgia     Sleep problems     Recurrent cold sores     Moderate major depression (H)     ADHD (attention deficit hyperactivity disorder)     CARDIOVASCULAR SCREENING; LDL GOAL LESS THAN 130     Cigarette smoker     Family history of colon cancer     Allergic rhinitis     Renal cyst, left     Chronic constipation     Diverticulosis of large intestine without hemorrhage     Past Surgical History:   Procedure Laterality Date     C REMOVAL OF OVARY(S)  1992    Left, cyst     TONSILLECTOMY  1979       Social History   Substance Use Topics      Smoking status: Current Every Day Smoker     Packs/day: 0.50     Types: Cigarettes     Start date: 1/1/1980     Smokeless tobacco: Never Used      Comment:  using e-cig daily     Alcohol use 0.0 oz/week     0 Standard drinks or equivalent per week      Comment:  drinks 3 days in a week, 2 beers each time      Family History   Problem Relation Age of Onset     Hypertension Maternal Grandmother      CEREBROVASCULAR DISEASE Maternal Grandmother      Alzheimer Disease Maternal Grandmother      Arthritis Maternal Grandmother      Obesity Maternal Grandmother      CANCER Other      Colon     Breast Cancer Other      Cancer - colorectal Paternal Grandfather          Current Outpatient Prescriptions   Medication Sig Dispense Refill     diclofenac (VOLTAREN) 75 MG EC tablet Take 1 tablet (75 mg) by mouth 2 times daily as needed for moderate pain 20 tablet 0     ketotifen (ZADITOR) 0.025 % SOLN ophthalmic solution Place 1 drop into both eyes 2 times daily 1 Bottle 1     acyclovir (ZOVIRAX) 400 MG tablet Take 1 tablet (400 mg) by mouth 3 times daily 15 tablet 1     sertraline (ZOLOFT) 100 MG tablet Start with 0.5tablet(50 mg) for 1 week then 1 tablet (100 mg) daily (Patient not taking: Reported on 8/14/2017) 30 tablet 2     valACYclovir (VALTREX) 500 MG tablet Take 1 tablet (500 mg) by mouth 2 times daily 18 tablet 3     gabapentin (NEURONTIN) 100 MG capsule Take 2 capsules (200 mg) by mouth 3 times daily Start week 1: 100 mg three times/day. Week 2: 100 mg morning and lunch, 200 mg bed time. Wk 3 and afterwards: 200 mg three times a day (Patient not taking: Reported on 8/14/2017) 180 capsule 0     ibuprofen (ADVIL,MOTRIN) 800 MG tablet TAKE 1 TABLET(800 MG) BY MOUTH EVERY 8 HOURS AS NEEDED FOR MODERATE PAIN 30 tablet 0     fexofenadine (ALLEGRA) 180 MG tablet Take 1 tablet (180 mg) by mouth daily 30 tablet 1     polyethylene glycol (MIRALAX) powder Take 17 g (1 capful) by mouth daily 510 g 1     amphetamine-dextroamphetamine  (ADDERALL) 20 MG tablet Take 1 tablet (20 mg) by mouth 2 times daily 60 tablet 0     nicotine (NICODERM CQ) 21 MG/24HR patch 2h hr Place 1 patch onto the skin every 24 hours 30 patch 4     aspirin 325 MG EC tablet Take  by mouth daily.       Allergies   Allergen Reactions     Cats Itching     Eyes, Sneezy,     Codeine Nausea and Vomiting     Recent Labs   Lab Test  12/22/16   1258 12/19/16 06/29/16 05/20/16   1406  12/29/15   1453  08/04/15   1324   LDL   --    --    --    --    --   103*   --    HDL   --    --    --    --    --   78   --    TRIG   --    --    --    --    --   29   --    ALT  17   --   30   --   19   --    --    CR  0.76  0.72  0.64   < >   --    --    --    GFRESTIMATED  81  >60  >60   < >   --    --    --    GFRESTBLACK  >90   GFR Calc     --   >60   --    --    --    --    POTASSIUM  3.8  4.0  5.0   < >   --    --    --    TSH   --    --   1.28   --    --    --   0.85    < > = values in this interval not displayed.      BP Readings from Last 3 Encounters:   09/20/17 113/82   08/14/17 110/70   07/14/17 121/81    Wt Readings from Last 3 Encounters:   09/20/17 167 lb (75.8 kg)   08/14/17 167 lb 8 oz (76 kg)   07/14/17 166 lb 12.8 oz (75.7 kg)               Depression and Anxiety Follow-Up    Status since last visit: has Not been taking her medicines    Other associated symptoms:None    Complicating factors:     Significant life event: No     Current substance abuse: None    PHQ-9 SCORE 12/15/2016 5/23/2017 9/20/2017   Total Score - - -   Total Score 19 14 7     MARY BETH-7 SCORE 5/20/2016 12/15/2016 5/23/2017   Total Score - - -   Total Score 12 15 15       PHQ-9  English  PHQ-9   Any Language  GAD7      Amount of exercise or physical activity: None    Problems taking medications regularly: No    Medication side effects: none  Diet: regular (no restrictions)         Labs reviewed in EPIC          Reviewed and updated as needed this visit by clinical staff       Reviewed and updated as  "needed this visit by Provider         ROS:  C: NEGATIVE for fever, chills, change in weight  ENT/MOUTH: as above  RESP:NEGATIVE for significant cough or SOB  CV: NEGATIVE for chest pain, palpitations or peripheral edema  GI: NEGATIVE for nausea, abdominal pain, heartburn, or change in bowel habits  : as above    OBJECTIVE:     /82  Pulse 85  Temp 98.1  F (36.7  C) (Oral)  Ht 5' 8\" (1.727 m)  Wt 167 lb (75.8 kg)  LMP 01/03/2014  SpO2 97%  Breastfeeding? No  BMI 25.39 kg/m2  Body mass index is 25.39 kg/(m^2).  GENERAL: healthy, alert and no distress  NECK: no adenopathy, no asymmetry, masses, or scars and thyroid normal to palpation  RESP: lungs clear to auscultation - no rales, rhonchi or wheezes  CV: regular rate and rhythm, normal S1 S2, no S3 or S4, no murmur, click or rub, no peripheral edema and peripheral pulses strong  ABDOMEN: soft, nontender, no hepatosplenomegaly, no masses and bowel sounds normal  MS: no gross musculoskeletal defects noted, no edema  PSYCH: anxious    Diagnostic Test Results:  Results for orders placed or performed in visit on 09/20/17 (from the past 24 hour(s))   Wet prep   Result Value Ref Range    Specimen Description Vagina     Wet Prep Clue cells seen (A)     Wet Prep No Trichomonas seen     Wet Prep No yeast seen     Wet Prep (Note)  SELF COLLECT.      *UA reflex to Microscopic and Culture ([place] and [facility] (except [place] and [place])   Result Value Ref Range    Color Urine Yellow     Appearance Urine Slightly Cloudy     Glucose Urine Negative NEG^Negative mg/dL    Bilirubin Urine Negative NEG^Negative    Ketones Urine Negative NEG^Negative mg/dL    Specific Gravity Urine 1.010 1.003 - 1.035    Blood Urine Small (A) NEG^Negative    pH Urine 7.0 5.0 - 7.0 pH    Protein Albumin Urine Negative NEG^Negative mg/dL    Urobilinogen Urine 0.2 0.2 - 1.0 EU/dL    Nitrite Urine Negative NEG^Negative    Leukocyte Esterase Urine Small (A) NEG^Negative    Source " "Midstream Urine    Urine Microscopic   Result Value Ref Range    WBC Urine 2-5 (A) OTO2^O - 2 /HPF    RBC Urine 5-10 (A) OTO2^O - 2 /HPF    Squamous Epithelial /LPF Urine Moderate (A) FEW^Few /LPF   Strep, Rapid Screen   Result Value Ref Range    Specimen Description Throat     Rapid Strep A Screen       NEGATIVE: No Group A streptococcal antigen detected by immunoassay, await culture report.       ASSESSMENT/PLAN:         BMI:   Estimated body mass index is 25.39 kg/(m^2) as calculated from the following:    Height as of this encounter: 5' 8\" (1.727 m).    Weight as of this encounter: 167 lb (75.8 kg).   Weight management plan: low benny diet/Exercise        1. Urinary frequency    - *UA reflex to Microscopic and Culture ([facility] (except [place] and [place])  - Urine Microscopic    2. Vaginal discharge    - Wet prep    3. Throat pain  Advised symptomatic Treatment/Rest fluids  - Strep, Rapid Screen  - Beta strep group A culture    4. MARY BETH (generalized anxiety disorder)  Advised start zoloft  I've explained to her that drugs of the SSRI class can have side effects such as weight gain, sexual dysfunction, insomnia, headache, nausea. These medications are generally effective at alleviating symptoms of anxiety and/or depression. Let me know if significant side effects do occur.  Follow up PMD 3 weeks  - sertraline (ZOLOFT) 100 MG tablet; Start with 0.5tablet(50 mg) for 1 week then 1 tablet (100 mg) daily  Dispense: 30 tablet; Refill: 0    5. Major depressive disorder, recurrent episode, moderate (H)  As above  - sertraline (ZOLOFT) 100 MG tablet; Start with 0.5tablet(50 mg) for 1 week then 1 tablet (100 mg) daily  Dispense: 30 tablet; Refill: 0  - DEPRESSION ACTION PLAN (DAP)    6. BV (bacterial vaginosis)  SEE EPIC care orders  The potential side effects of this medication have been discussed with the patient.  Call if any significant problems with these are experienced.    - clindamycin (CLEOCIN) " 2 % cream; Place 1 applicator vaginally At Bedtime for 7 days  Dispense: 40 g; Refill: 0    7. Herpes labialis  refilled  - valACYclovir (VALTREX) 1000 mg tablet; Take 2 tablets (2,000 mg) by mouth 2 times daily  Dispense: 20 tablet; Refill: 0    8. Upper respiratory tract infection, unspecified type  As above  Follow up 1 week if not better/sooner if worse    - ibuprofen (ADVIL/MOTRIN) 800 MG tablet; TAKE 1 TABLET(800 MG) BY MOUTH EVERY 8 HOURS AS NEEDED FOR MODERATE PAIN  Dispense: 30 tablet; Refill: 0    9. Microhematuria  Advised if UC is negative-she should make appointment with Urology for further evaluation   Discussed Possible Other cause of Hematuria  - Urine Culture Aerobic Bacterial  - UROLOGY ADULT REFERRAL    Myriam Tyler MD  [facility]

## 2017-09-19 NOTE — TELEPHONE ENCOUNTER
RN spoke with patient and states she has been having cold like symptoms for the past 3 days and pelvic pain as well.  RN informed patient the provider might not be able to assess for both symptoms at one visit.  RN offered today's appointment with Jodi Iqbal CNP, but patient declined because she said it's hard for her to drive a long way around 2PM and she rather keep her appointment for tomorrow with Dr. Tyler at 9:40 AM.   Patient denies of fever, SOB, difficulty breathing, severe abdominal or pelvic pain at this time.  Patient advised to call the clinic with any new symptoms or to go to ER with worsening symptoms.  Patient agrees and verbalized understanding.    Osmani BOYER RN, BSN

## 2017-09-19 NOTE — TELEPHONE ENCOUNTER
Reason for Call:  Other appointment    Detailed comments: Patient went to the ER but the wait time was too long so she came back home. She is wanting to be seen tomorrow as she feels she might have pneumonia and she's wanting to be evaluated by a female physician for pelvic pain. Would you follow up with her to see if she can be squeezed into the schedule tomorrow. Otherwise, she does have an appointment on Wednesday morning scheduled. Thank you.    Phone Number Patient can be reached at: Home number on file [phone] (home)    Best Time: ASAP    Can we leave a detailed message on this number? YES    Call taken on 9/18/2017 at 7:58 PM by Myles Brown

## 2017-09-20 ENCOUNTER — OFFICE VISIT (OUTPATIENT)
Dept: FAMILY MEDICINE | Facility: CLINIC | Age: 47
End: 2017-09-20
Payer: COMMERCIAL

## 2017-09-20 VITALS
WEIGHT: 167 LBS | HEART RATE: 85 BPM | HEIGHT: 68 IN | BODY MASS INDEX: 25.31 KG/M2 | SYSTOLIC BLOOD PRESSURE: 113 MMHG | TEMPERATURE: 98.1 F | OXYGEN SATURATION: 97 % | DIASTOLIC BLOOD PRESSURE: 82 MMHG

## 2017-09-20 DIAGNOSIS — N76.0 BV (BACTERIAL VAGINOSIS): ICD-10-CM

## 2017-09-20 DIAGNOSIS — R07.0 THROAT PAIN: ICD-10-CM

## 2017-09-20 DIAGNOSIS — N89.8 VAGINAL DISCHARGE: ICD-10-CM

## 2017-09-20 DIAGNOSIS — B96.89 BV (BACTERIAL VAGINOSIS): ICD-10-CM

## 2017-09-20 DIAGNOSIS — F33.1 MAJOR DEPRESSIVE DISORDER, RECURRENT EPISODE, MODERATE (H): ICD-10-CM

## 2017-09-20 DIAGNOSIS — F41.1 GAD (GENERALIZED ANXIETY DISORDER): ICD-10-CM

## 2017-09-20 DIAGNOSIS — R31.29 MICROHEMATURIA: ICD-10-CM

## 2017-09-20 DIAGNOSIS — R35.0 URINARY FREQUENCY: Primary | ICD-10-CM

## 2017-09-20 DIAGNOSIS — J06.9 UPPER RESPIRATORY TRACT INFECTION, UNSPECIFIED TYPE: ICD-10-CM

## 2017-09-20 DIAGNOSIS — B00.1 HERPES LABIALIS: ICD-10-CM

## 2017-09-20 LAB
ALBUMIN UR-MCNC: NEGATIVE MG/DL
APPEARANCE UR: ABNORMAL
BILIRUB UR QL STRIP: NEGATIVE
COLOR UR AUTO: YELLOW
DEPRECATED S PYO AG THROAT QL EIA: NORMAL
GLUCOSE UR STRIP-MCNC: NEGATIVE MG/DL
HGB UR QL STRIP: ABNORMAL
KETONES UR STRIP-MCNC: NEGATIVE MG/DL
LEUKOCYTE ESTERASE UR QL STRIP: ABNORMAL
NITRATE UR QL: NEGATIVE
NON-SQ EPI CELLS #/AREA URNS LPF: ABNORMAL /LPF
PH UR STRIP: 7 PH (ref 5–7)
RBC #/AREA URNS AUTO: ABNORMAL /HPF
SOURCE: ABNORMAL
SP GR UR STRIP: 1.01 (ref 1–1.03)
SPECIMEN SOURCE: ABNORMAL
SPECIMEN SOURCE: NORMAL
UROBILINOGEN UR STRIP-ACNC: 0.2 EU/DL (ref 0.2–1)
WBC #/AREA URNS AUTO: ABNORMAL /HPF
WET PREP SPEC: ABNORMAL

## 2017-09-20 PROCEDURE — 87880 STREP A ASSAY W/OPTIC: CPT | Performed by: FAMILY MEDICINE

## 2017-09-20 PROCEDURE — 87086 URINE CULTURE/COLONY COUNT: CPT | Performed by: FAMILY MEDICINE

## 2017-09-20 PROCEDURE — 87081 CULTURE SCREEN ONLY: CPT | Performed by: FAMILY MEDICINE

## 2017-09-20 PROCEDURE — 99214 OFFICE O/P EST MOD 30 MIN: CPT | Performed by: FAMILY MEDICINE

## 2017-09-20 PROCEDURE — 81001 URINALYSIS AUTO W/SCOPE: CPT | Performed by: FAMILY MEDICINE

## 2017-09-20 PROCEDURE — 87210 SMEAR WET MOUNT SALINE/INK: CPT | Performed by: FAMILY MEDICINE

## 2017-09-20 RX ORDER — VALACYCLOVIR HYDROCHLORIDE 1 G/1
2000 TABLET, FILM COATED ORAL 2 TIMES DAILY
Qty: 20 TABLET | Refills: 0 | Status: SHIPPED | OUTPATIENT
Start: 2017-09-20 | End: 2018-02-06

## 2017-09-20 RX ORDER — IBUPROFEN 800 MG/1
TABLET, FILM COATED ORAL
Qty: 30 TABLET | Refills: 0 | Status: SHIPPED | OUTPATIENT
Start: 2017-09-20 | End: 2017-12-20

## 2017-09-20 RX ORDER — SERTRALINE HYDROCHLORIDE 100 MG/1
TABLET, FILM COATED ORAL
Qty: 30 TABLET | Refills: 0 | Status: SHIPPED | OUTPATIENT
Start: 2017-09-20 | End: 2017-10-31

## 2017-09-20 RX ORDER — CLINDAMYCIN PHOSPHATE 20 MG/G
1 CREAM VAGINAL AT BEDTIME
Qty: 40 G | Refills: 0 | Status: SHIPPED | OUTPATIENT
Start: 2017-09-20 | End: 2017-09-27

## 2017-09-20 ASSESSMENT — PATIENT HEALTH QUESTIONNAIRE - PHQ9: SUM OF ALL RESPONSES TO PHQ QUESTIONS 1-9: 7

## 2017-09-20 NOTE — LETTER
My Depression Action Plan  Name: Marlin Lopez   Date of Birth 1970  Date: 9/20/2017    My doctor: Zach Corona   My clinic: [place] [id number] [facility]  [place] [address]  [phone]          GREEN    ZONE   Good Control    What it looks like:     Things are going generally well. You have normal up s and down s. You may even feel depressed from time to time, but bad moods usually last less than a day.   What you need to do:  1. Continue to care for yourself (see self care plan)  2. Check your depression survival kit and update it as needed  3. Follow your physician s recommendations including any medication.  4. Do not stop taking medication unless you consult with your physician first.           YELLOW         ZONE Getting Worse    What it looks like:     Depression is starting to interfere with your life.     It may be hard to get out of bed; you may be starting to isolate yourself from others.    Symptoms of depression are starting to last most all day and this has happened for several days.     You may have suicidal thoughts but they are not constant.   What you need to do:     1. Call your care team, your response to treatment will improve if you keep your care team informed of your progress. Yellow periods are signs an adjustment may need to be made.     2. Continue your self-care, even if you have to fake it!    3. Talk to someone in your support network    4. Open up your depression survival kit           RED    ZONE Medical Alert - Get Help    What it looks like:     Depression is seriously interfering with your life.     You may experience these or other symptoms: You can t get out of bed most days, can t work or engage in other necessary activities, you have trouble taking care of basic hygiene, or basic responsibilities, thoughts of suicide or death that will not go away, self-injurious behavior.     What you need to do:  1. Call your care team  and request a same-day appointment. If they are not available (weekends or after hours) call your local crisis line, emergency room or 911.      Electronically signed by: Myriam Tyler, September 20, 2017    Depression Self Care Plan / Survival Kit    Self-Care for Depression  Here s the deal. Your body and mind are really not as separate as most people think.  What you do and think affects how you feel and how you feel influences what you do and think. This means if you do things that people who feel good do, it will help you feel better.  Sometimes this is all it takes.  There is also a place for medication and therapy depending on how severe your depression is, so be sure to consult with your medical provider and/ or Behavioral Health Consultant if your symptoms are worsening or not improving.     In order to better manage my stress, I will:    Exercise  Get some form of exercise, every day. This will help reduce pain and release endorphins, the  feel good  chemicals in your brain. This is almost as good as taking antidepressants!  This is not the same as joining a gym and then never going! (they count on that by the way ) It can be as simple as just going for a walk or doing some gardening, anything that will get you moving.      Hygiene   Maintain good hygiene (Get out of bed in the morning, Make your bed, Brush your teeth, Take a shower, and Get dressed like you were going to work, even if you are unemployed).  If your clothes don't fit try to get ones that do.    Diet  I will strive to eat foods that are good for me, drink plenty of water, and avoid excessive sugar, caffeine, alcohol, and other mood-altering substances.  Some foods that are helpful in depression are: complex carbohydrates, B vitamins, flaxseed, fish or fish oil, fresh fruits and vegetables.    Psychotherapy  I agree to participate in Individual Therapy (if recommended).    Medication  If prescribed medications, I agree to take them.  Missing  doses can result in serious side effects.  I understand that drinking alcohol, or other illicit drug use, may cause potential side effects.  I will not stop my medication abruptly without first discussing it with my provider.    Staying Connected With Others  I will stay in touch with my friends, family members, and my primary care provider/team.    Use your imagination  Be creative.  We all have a creative side; it doesn t matter if it s oil painting, sand castles, or mud pies! This will also kick up the endorphins.    Witness Beauty  (AKA stop and smell the roses) Take a look outside, even in mid-winter. Notice colors, textures. Watch the squirrels and birds.     Service to others  Be of service to others.  There is always someone else in need.  By helping others we can  get out of ourselves  and remember the really important things.  This also provides opportunities for practicing all the other parts of the program.    Humor  Laugh and be silly!  Adjust your TV habits for less news and crime-drama and more comedy.    Control your stress  Try breathing deep, massage therapy, biofeedback, and meditation. Find time to relax each day.     My support system    Clinic Contact:  Phone number:    Contact 1:  Phone number:    Contact 2:  Phone number:    [facility]/:  Phone number:    Therapist:  Phone number:    Local crisis center:    Phone number:    Other community support:  Phone number:

## 2017-09-20 NOTE — NURSING NOTE
"Chief Complaint   Patient presents with     UTI       Initial /82  Pulse 85  Temp 98.1  F (36.7  C) (Oral)  Ht 5' 8\" (1.727 m)  Wt 167 lb (75.8 kg)  LMP 01/03/2014  SpO2 97%  Breastfeeding? No  BMI 25.39 kg/m2 Estimated body mass index is 25.39 kg/(m^2) as calculated from the following:    Height as of this encounter: 5' 8\" (1.727 m).    Weight as of this encounter: 167 lb (75.8 kg).  Medication Reconciliation: complete   Marlin ABREU MA      "

## 2017-09-20 NOTE — MR AVS SNAPSHOT
After Visit Summary   9/20/2017    Marlin Lopez    MRN: [id number]           Patient Information     Date Of Birth          1970        Visit Information        Provider Department      9/20/2017 9:40 AM Myriam Tyler MD [facility]        Today's Diagnoses     Urinary frequency    -  1    Vaginal discharge        Throat pain        MARY BETH (generalized anxiety disorder)        Major depressive disorder, recurrent episode, moderate (H)        BV (bacterial vaginosis)        Herpes labialis        Upper respiratory tract infection, unspecified type          Care Instructions      Urine culture is normal, then Please see Dr Dotson Urology for further evaluation  Take care    Myriam Tyler MD      [facility]    If you have any questions regarding to your visit please contact your care team:       Team Red:   Clinic Hours Telephone Number   Dr. Estephania Murray, NP   7am-7pm  Monday - Thursday   7am-5pm  Fridays  [phone]  (Appointment scheduling available 24/7)    Questions about your visit?   Team Line  [phone]   Urgent Care - [place] - 11am-9pm Monday-Friday Saturday-Sunday- 9am-5pm   [place] - 5pm-9pm Monday-Friday Saturday-Sunday- 9am-5pm  [phone] - [place]   [phone] - [place]       What options do I have for visits at the clinic other than the traditional office visit?  To expand how we care for you, many of our providers are utilizing electronic visits (e-visits) and telephone visits, when medically appropriate, for interactions with their patients rather than a visit in the clinic.   We also offer nurse visits for many medical concerns. Just like any other service, we will bill your insurance company for this type of visit based on time spent on the phone with your provider. Not all insurance companies cover these visits. Please check with your medical  "insurance if this type of visit is covered. You will be responsible for any charges that are not paid by your insurance.      E-visits via [employer]:  generally incur a $35.00 fee.  Telephone visits:  Time spent on the phone: *charged based on time that is spent on the phone in increments of 10 minutes. Estimated cost:   5-10 mins $30.00   11-20 mins. $59.00   21-30 mins. $85.00     Use [employer] (secure email communication and access to your chart) to send your primary care provider a message or make an appointment. Ask someone on your Team how to sign up for [employer].  For a Price Quote for your services, please call our [employer] Line at [phone].      As always, Thank you for trusting us with your health care needs!        Discharged by Mavis La MA.            Follow-ups after your visit        Who to contact     If you have questions or need follow up information about today's clinic visit or your schedule please contact [facility] directly at [phone].  Normal or non-critical lab and imaging results will be communicated to you by [employer], letter or phone within 4 business days after the clinic has received the results. If you do not hear from us within 7 days, please contact the clinic through [employer] or phone. If you have a critical or abnormal lab result, we will notify you by phone as soon as possible.  Submit refill requests through [employer] or call your pharmacy and they will forward the refill request to us. Please allow 3 business days for your refill to be completed.          Additional Information About Your Visit        Obvious Information     Obvious lets you send messages to your doctor, view your test results, renew your prescriptions, schedule appointments and more. To sign up, go to [email or web address] . Click on \"Log in\" on the left side of the screen, which will take you to the Welcome page. Then click on \"Sign up Now\" on the right side of the page.     You will " "be asked to enter the access code listed below, as well as some personal information. Please follow the directions to create your username and password.     Your access code is: [email or web address]  Expires: 2017 11:50 AM     Your access code will  in 90 days. If you need help or a new code, please call your [place] clinic or [phone].        Care EveryWhere ID     This is your Care EveryWhere ID. This could be used by other organizations to access your [place] medical records  [id number]        Your Vitals Were     Pulse Temperature Height Last Period Pulse Oximetry Breastfeeding?    85 98.1  F (36.7  C) (Oral) 5' 8\" (1.727 m) 2014 97% No    BMI (Body Mass Index)                   25.39 kg/m2            Blood Pressure from Last 3 Encounters:   17 113/82   17 110/70   17 121/81    Weight from Last 3 Encounters:   17 167 lb (75.8 kg)   17 167 lb 8 oz (76 kg)   17 166 lb 12.8 oz (75.7 kg)              We Performed the Following     *UA reflex to Microscopic and Culture ([place] and [facility] (except [place] and [place])     Beta strep group A culture     DEPRESSION ACTION PLAN (DAP)     Strep, Rapid Screen     Urine Microscopic     Wet prep          Today's Medication Changes          These changes are accurate as of: 17 10:48 AM.  If you have any questions, ask your nurse or doctor.               Start taking these medicines.        Dose/Directions    clindamycin 2 % cream   Commonly known as:  CLEOCIN   Used for:  BV (bacterial vaginosis)   Started by:  Myriam Tyler MD        Dose:  1 applicator   Place 1 applicator vaginally At Bedtime for 7 days   Quantity:  40 g   Refills:  0         These medicines have changed or have updated prescriptions.        Dose/Directions    ibuprofen 800 MG tablet   Commonly known as:  ADVIL/MOTRIN   This may have changed:  See the new instructions.   Used for:  Upper respiratory tract infection, unspecified type "   Changed by:  Myriam Tyler MD        TAKE 1 TABLET(800 MG) BY MOUTH EVERY 8 HOURS AS NEEDED FOR MODERATE PAIN   Quantity:  30 tablet   Refills:  0       * valACYclovir 500 MG tablet   Commonly known as:  VALTREX   This may have changed:  Another medication with the same name was added. Make sure you understand how and when to take each.   Used for:  Recurrent cold sores   Changed by:  Zach Corona MD        Dose:  500 mg   Take 1 tablet (500 mg) by mouth 2 times daily   Quantity:  18 tablet   Refills:  3       * valACYclovir 1000 mg tablet   Commonly known as:  VALTREX   This may have changed:  You were already taking a medication with the same name, and this prescription was added. Make sure you understand how and when to take each.   Used for:  Herpes labialis   Changed by:  Myriam Tyler MD        Dose:  2000 mg   Take 2 tablets (2,000 mg) by mouth 2 times daily   Quantity:  20 tablet   Refills:  0       * Notice:  This list has 2 medication(s) that are the same as other medications prescribed for you. Read the directions carefully, and ask your doctor or other care provider to review them with you.         Where to get your medicines      These medications [facility] [id number]  [facility] [address]   [address] [place] [address]     Phone:  [phone]     clindamycin 2 % cream    ibuprofen 800 MG tablet    sertraline 100 MG tablet    valACYclovir 1000 mg tablet                Primary Care Provider Office Phone # Fax #    Zach Corona [phone]       [address] [place] [address]        Equal Access to Services     [facility] AH: Hadii susie jones hadashclaritza Somorgan, waaxda luqadaha, qaybta kaalmada ethan, charli vasquez. So [facility] [phone].    ATENCIÓN: Si habla español, tiene a chavez disposición servicios gratuitos de asistencia lingüística. Llame al [phone].    We  comply with applicable federal civil rights laws and [place] laws. We do not discriminate on the basis of race, color, national origin, age, disability sex, sexual orientation or gender identity.            Thank you!     Thank you for choosing [facility]  for your care. Our goal is always to provide you with excellent care. Hearing back from our patients is one way we can continue to improve our services. Please take a few minutes to complete the written survey that you may receive in the mail after your visit with us. Thank you!             Your Updated Medication List - Protect others around you: Learn how to safely use, store and throw away your medicines at www.disposemymeds.org.          This list is accurate as of: 9/20/17 10:48 AM.  Always use your most recent med list.                   Brand Name Dispense Instructions for use Diagnosis    acyclovir 400 MG tablet    ZOVIRAX    15 tablet    Take 1 tablet (400 mg) by mouth 3 times daily    Recurrent herpes simplex       amphetamine-dextroamphetamine 20 MG per tablet    ADDERALL    60 tablet    Take 1 tablet (20 mg) by mouth 2 times daily    Attention deficit hyperactivity disorder (ADHD), unspecified ADHD type       aspirin 325 MG EC tablet      Take  by mouth daily.        clindamycin 2 % cream    CLEOCIN    40 g    Place 1 applicator vaginally At Bedtime for 7 days    BV (bacterial vaginosis)       diclofenac 75 MG EC tablet    VOLTAREN    20 tablet    Take 1 tablet (75 mg) by mouth 2 times daily as needed for moderate pain    Chest wall pain       fexofenadine 180 MG tablet    ALLEGRA    30 tablet    Take 1 tablet (180 mg) by mouth daily    Seasonal allergic rhinitis       gabapentin 100 MG capsule    NEURONTIN    180 capsule    Take 2 capsules (200 mg) by mouth 3 times daily Start week 1: 100 mg three times/day. Week 2: 100 mg morning and lunch, 200 mg bed time. Wk 3 and afterwards: 200 mg three times a day    Paresthesias       ibuprofen  800 MG tablet    ADVIL/MOTRIN    30 tablet    TAKE 1 TABLET(800 MG) BY MOUTH EVERY 8 HOURS AS NEEDED FOR MODERATE PAIN    Upper respiratory tract infection, unspecified type       ketotifen 0.025 % Soln ophthalmic solution    ZADITOR    1 Bottle    Place 1 drop into both eyes 2 times daily    Itchy eyes       nicotine 21 MG/24HR 24 hr patch    NICODERM CQ    30 patch    Place 1 patch onto the skin every 24 hours    Nicotine withdrawal       polyethylene glycol powder    MIRALAX    510 g    Take 17 g (1 capful) by mouth daily    Flatulence, eructation, and gas pain       sertraline 100 MG tablet    ZOLOFT    30 tablet    Start with 0.5tablet(50 mg) for 1 week then 1 tablet (100 mg) daily    MARY BETH (generalized anxiety disorder), Major depressive disorder, recurrent episode, moderate (H)       * valACYclovir 500 MG tablet    VALTREX    18 tablet    Take 1 tablet (500 mg) by mouth 2 times daily    Recurrent cold sores       * valACYclovir 1000 mg tablet    VALTREX    20 tablet    Take 2 tablets (2,000 mg) by mouth 2 times daily    Herpes labialis       * Notice:  This list has 2 medication(s) that are the same as other medications prescribed for you. Read the directions carefully, and ask your doctor or other care provider to review them with you.

## 2017-09-21 LAB
BACTERIA SPEC CULT: NO GROWTH
BACTERIA SPEC CULT: NORMAL
SPECIMEN SOURCE: NORMAL
SPECIMEN SOURCE: NORMAL

## 2017-10-11 ENCOUNTER — OFFICE VISIT (OUTPATIENT)
Dept: UROLOGY | Facility: CLINIC | Age: 47
End: 2017-10-11
Payer: COMMERCIAL

## 2017-10-11 VITALS — HEART RATE: 76 BPM | RESPIRATION RATE: 12 BRPM | SYSTOLIC BLOOD PRESSURE: 112 MMHG | DIASTOLIC BLOOD PRESSURE: 78 MMHG

## 2017-10-11 DIAGNOSIS — R31.29 MICROHEMATURIA: Primary | ICD-10-CM

## 2017-10-11 LAB
ALBUMIN UR-MCNC: NEGATIVE MG/DL
APPEARANCE UR: CLEAR
BILIRUB UR QL STRIP: NEGATIVE
COLOR UR AUTO: YELLOW
GLUCOSE UR STRIP-MCNC: NEGATIVE MG/DL
HGB UR QL STRIP: NEGATIVE
KETONES UR STRIP-MCNC: NEGATIVE MG/DL
LEUKOCYTE ESTERASE UR QL STRIP: ABNORMAL
NITRATE UR QL: NEGATIVE
NON-SQ EPI CELLS #/AREA URNS LPF: ABNORMAL /LPF
PH UR STRIP: 7 PH (ref 5–7)
RBC #/AREA URNS AUTO: ABNORMAL /HPF
SOURCE: ABNORMAL
SP GR UR STRIP: 1.01 (ref 1–1.03)
UROBILINOGEN UR STRIP-ACNC: 0.2 EU/DL (ref 0.2–1)
WBC #/AREA URNS AUTO: ABNORMAL /HPF

## 2017-10-11 PROCEDURE — 52000 CYSTOURETHROSCOPY: CPT | Performed by: UROLOGY

## 2017-10-11 PROCEDURE — 81001 URINALYSIS AUTO W/SCOPE: CPT | Performed by: UROLOGY

## 2017-10-11 PROCEDURE — 99244 OFF/OP CNSLTJ NEW/EST MOD 40: CPT | Mod: 25 | Performed by: UROLOGY

## 2017-10-11 NOTE — PROGRESS NOTES
Marlin Lopez is a 47 year old female seen in consultation for microhematuria. Consult from Zach Corona.    Also several other concerns today:  1. Urge without urge incont  2. Recurrent vaginal infections  3. Weight gain of 20# in the last 5 yrs  4. Chronic constipation      Denies dysuria, gross hematria, frequency, incont, need for pad.     Denies prior  eval, hx bladder surgery, use of bladder meds.     Hx one vag delivery, no hyster. Not on HRT.     Rarely eats breakfast, occasionally a banana, to start new dietary weightloss challenge soon.    Nonsmoker at this point; does use E cig. No occupational smoking hx.     No personal or FH nephrolithiasis.         Past Medical History:   Diagnosis Date           ADHD (attention deficit hyperactivity disorder)     avaluation from [facility] psychologist- chauncey ivey     Anxiety state, unspecified     Followed by psychiatry at [facility] in [place], on Wellbutrin      CARDIOVASCULAR SCREENING; LDL GOAL LESS THAN 130 10/31/2010    fhx, cigs     Cigarette smoker      Dysuria      Endometrial polyps      Miscarriage      Ovarian cysts      PID (pelvic inflammatory disease)      Recurrent cold sores      Recurrent cold sores        Past Surgical History:   Procedure Laterality Date     C REMOVAL OF OVARY(S)      Left, cyst     TONSILLECTOMY         Social History     Social History     Marital status: Legally      Spouse name: Clem     Number of children: 1     Years of education: 14     Occupational History           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      Social History Main Topics     Smoking status: Former Smoker     Packs/day: 0.50     Types: Cigarettes     Start date: 1980     Quit date: 2015     Smokeless tobacco: Never Used      Comment:  using e-cig daily     Alcohol use 0.0 oz/week     0 Standard drinks or equivalent per week      Comment:  drinks 3 days in a week, 2 beers each time      Drug  use: No     Sexual activity: Yes     Partners: Male     Other Topics Concern     Not on file     Social History Narrative       Current Outpatient Prescriptions   Medication Sig Dispense Refill     sertraline (ZOLOFT) 100 MG tablet Start with 0.5tablet(50 mg) for 1 week then 1 tablet (100 mg) daily 30 tablet 0     ibuprofen (ADVIL/MOTRIN) 800 MG tablet TAKE 1 TABLET(800 MG) BY MOUTH EVERY 8 HOURS AS NEEDED FOR MODERATE PAIN 30 tablet 0     valACYclovir (VALTREX) 1000 mg tablet Take 2 tablets (2,000 mg) by mouth 2 times daily 20 tablet 0     diclofenac (VOLTAREN) 75 MG EC tablet Take 1 tablet (75 mg) by mouth 2 times daily as needed for moderate pain 20 tablet 0     ketotifen (ZADITOR) 0.025 % SOLN ophthalmic solution Place 1 drop into both eyes 2 times daily 1 Bottle 1     acyclovir (ZOVIRAX) 400 MG tablet Take 1 tablet (400 mg) by mouth 3 times daily 15 tablet 1     valACYclovir (VALTREX) 500 MG tablet Take 1 tablet (500 mg) by mouth 2 times daily 18 tablet 3     fexofenadine (ALLEGRA) 180 MG tablet Take 1 tablet (180 mg) by mouth daily 30 tablet 1     polyethylene glycol (MIRALAX) powder Take 17 g (1 capful) by mouth daily 510 g 1     amphetamine-dextroamphetamine (ADDERALL) 20 MG tablet Take 1 tablet (20 mg) by mouth 2 times daily 60 tablet 0     nicotine (NICODERM CQ) 21 MG/24HR patch 2h hr Place 1 patch onto the skin every 24 hours 30 patch 4     aspirin 325 MG EC tablet Take  by mouth daily.         Physical Exam:    GENL: NAD.    ABD: Soft, non-tender, no masses.    EG: Well-estrogenized, no masses.    VAGINA: Well-estrogenized, no masses.    BN HYPERMOBILITY: Mild.    CYSTOCELE: Grade 1.    APICAL PROLAPSE: Minimal.    RECTOCELE: Minimal.    BIMANUAL: No mass or tenderness.    Cysto:    (Informed consent obtained. Pause for cause performed)   Sterile prep.    17 Fr scope inserted through urethra. Systematic examination w 70 degree lens.   PVR: 5 cc   MUCOSA: Normal without lesion   ORIFICES: Normal location  and morphology   CAPACITY: 500 cc; no pain with filling   Scope withdrawn without untoward effect.    (Pt tolerated procedure without difficulty).        Component      Latest Ref Rng & Units 5/23/2017             Color Urine       Yellow   Appearance Urine       Clear   Glucose Urine      NEG:Negative mg/dL Negative   Bilirubin Urine      NEG:Negative Negative   Ketones Urine      NEG:Negative mg/dL Negative   Specific Gravity Urine      1.003 - 1.035 1.015   Blood Urine      NEG:Negative Trace (A)   pH Urine      5.0 - 7.0 pH 6.5   Protein Albumin Urine      NEG:Negative mg/dL Negative   Urobilinogen Urine      0.2 - 1.0 EU/dL 0.2   Nitrite Urine      NEG:Negative Negative   Leukocyte Esterase Urine      NEG:Negative Negative   Source       Midstream Urine   Specimen Description       Vagina   Wet Prep       No Trichomonas seen . . .   Micro Report Status       FINAL 05/23/2017   WBC Urine      OTO2:O - 2 /HPF O - 2   RBC Urine      OTO2:O - 2 /HPF O - 2   Squamous Epithelial /LPF Urine      FEW:Few /LPF Few   Specimen Descrip          N Gonorrhea PCR      NEG    Chlamydia Trachomatis PCR      NEG    Culture Micro            Component      Latest Ref Rng & Units 7/14/2017 7/14/2017          11:20 AM 11:28 AM   Color Urine        Yellow   Appearance Urine        Clear   Glucose Urine      NEG:Negative mg/dL  Negative   Bilirubin Urine      NEG:Negative  Negative   Ketones Urine      NEG:Negative mg/dL  Negative   Specific Gravity Urine      1.003 - 1.035  <=1.005   Blood Urine      NEG:Negative  Trace (A)   pH Urine      5.0 - 7.0 pH  5.5   Protein Albumin Urine      NEG:Negative mg/dL  Negative   Urobilinogen Urine      0.2 - 1.0 EU/dL  0.2   Nitrite Urine      NEG:Negative  Negative   Leukocyte Esterase Urine      NEG:Negative  Negative   Source        Midstream Urine   Specimen Description       Vagina    Wet Prep       No Trichomonas seen (A) . . .    Micro Report Status       FINAL 07/14/2017    WBC Urine       OTO2:O - 2 /HPF  O - 2   RBC Urine      OTO2:O - 2 /HPF  O - 2   Squamous Epithelial /LPF Urine      FEW:Few /LPF  Few   Specimen Descrip           N Gonorrhea PCR      NEG     Chlamydia Trachomatis PCR      NEG     Culture Micro             Component      Latest Ref Rng & Units 7/14/2017 9/20/2017          11:55 AM  9:59 AM   Color Urine           Appearance Urine           Glucose Urine      NEG:Negative mg/dL     Bilirubin Urine      NEG:Negative     Ketones Urine      NEG:Negative mg/dL     Specific Gravity Urine      1.003 - 1.035     Blood Urine      NEG:Negative     pH Urine      5.0 - 7.0 pH     Protein Albumin Urine      NEG:Negative mg/dL     Urobilinogen Urine      0.2 - 1.0 EU/dL     Nitrite Urine      NEG:Negative     Leukocyte Esterase Urine      NEG:Negative     Source           Specimen Description       Vagina Vagina   Wet Prep        Clue cells seen (A)   Micro Report Status           WBC Urine      OTO2:O - 2 /HPF     RBC Urine      OTO2:O - 2 /HPF     Squamous Epithelial /LPF Urine      FEW:Few /LPF     Specimen Descrip       Vagina    N Gonorrhea PCR      NEG Negative . . .    Chlamydia Trachomatis PCR      NEG Negative . . .    Culture Micro             Component      Latest Ref Rng & Units 9/20/2017 9/20/2017 9/20/2017           9:59 AM  9:59 AM  9:59 AM   Color Urine            Appearance Urine            Glucose Urine      NEG:Negative mg/dL      Bilirubin Urine      NEG:Negative      Ketones Urine      NEG:Negative mg/dL      Specific Gravity Urine      1.003 - 1.035      Blood Urine      NEG:Negative      pH Urine      5.0 - 7.0 pH      Protein Albumin Urine      NEG:Negative mg/dL      Urobilinogen Urine      0.2 - 1.0 EU/dL      Nitrite Urine      NEG:Negative      Leukocyte Esterase Urine      NEG:Negative      Source            Specimen Description            Wet Prep       No Trichomonas seen No yeast seen (Note) . . .   Micro Report Status            WBC Urine      OTO2:O - 2 /HPF       RBC Urine      OTO2:O - 2 /HPF      Squamous Epithelial /LPF Urine      FEW:Few /LPF      Specimen Descrip            N Gonorrhea PCR      NEG      Chlamydia Trachomatis PCR      NEG      Culture Micro              Component      Latest Ref Rng & Units 9/20/2017          10:01 AM   Color Urine       Yellow   Appearance Urine       Slightly Cloudy   Glucose Urine      NEG:Negative mg/dL Negative   Bilirubin Urine      NEG:Negative Negative   Ketones Urine      NEG:Negative mg/dL Negative   Specific Gravity Urine      1.003 - 1.035 1.010   Blood Urine      NEG:Negative Small (A)   pH Urine      5.0 - 7.0 pH 7.0   Protein Albumin Urine      NEG:Negative mg/dL Negative   Urobilinogen Urine      0.2 - 1.0 EU/dL 0.2   Nitrite Urine      NEG:Negative Negative   Leukocyte Esterase Urine      NEG:Negative Small (A)   Source       Midstream Urine   Specimen Description       Midstream Urine   Wet Prep          Micro Report Status          WBC Urine      OTO2:O - 2 /HPF 2-5 (A)   RBC Urine      OTO2:O - 2 /HPF 5-10 (A)   Squamous Epithelial /LPF Urine      FEW:Few /LPF Moderate (A)   Specimen Descrip          N Gonorrhea PCR      NEG    Chlamydia Trachomatis PCR      NEG    Culture Micro       No growth         CT abd/pelvis 12/16 negative  by report        Results for orders placed or performed in visit on 10/11/17   UA reflex to Microscopic and Culture   Result Value Ref Range    Color Urine Yellow     Appearance Urine Clear     Glucose Urine Negative NEG^Negative mg/dL    Bilirubin Urine Negative NEG^Negative    Ketones Urine Negative NEG^Negative mg/dL    Specific Gravity Urine 1.015 1.003 - 1.035    Blood Urine Negative NEG^Negative    pH Urine 7.0 5.0 - 7.0 pH    Protein Albumin Urine Negative NEG^Negative mg/dL    Urobilinogen Urine 0.2 0.2 - 1.0 EU/dL    Nitrite Urine Negative NEG^Negative    Leukocyte Esterase Urine Small (A) NEG^Negative    Source Midstream Urine        IMP:  1. Minimal microhematuria;  satisfactory  eval  2. Hx vaginosis  3. Chronic constipation      PLAN:  1. Discussed situation with patient in detail and reviewed all labs with pt.  2. No evidence of active, ongoing  pathology  3. Consider increased dietary fiber; pt expresses understanding  4. RTC prn  issues  5. 60 minutes spent with patient, more than 50% in counseling and coordination of care for hematuria, which did not include time spent for the procedure.  6. Copy  P Cj

## 2017-10-11 NOTE — MR AVS SNAPSHOT
"              After Visit Summary   10/11/2017    Marlin Lopez    MRN: [id number]           Patient Information     Date Of Birth          1970        Visit Information        Provider Department      10/11/2017 11:00 AM Carl De La O MD [facility]        Today's Diagnoses     Microhematuria    -  1       Follow-ups after your visit        Follow-up notes from your care team     Return if symptoms worsen or fail to improve.      Who to contact     If you have questions or need follow up information about today's clinic visit or your schedule please contact [facility] directly at [phone].  Normal or non-critical lab and imaging results will be communicated to you by [email or web address], letter or phone within 4 business days after the clinic has received the results. If you do not hear from us within 7 days, please contact the clinic through [email or web address] or phone. If you have a critical or abnormal lab result, we will notify you by phone as soon as possible.  Submit refill requests through [email or web address] or call your pharmacy and they will forward the refill request to us. Please allow 3 business days for your refill to be completed.          Additional Information About Your Visit        MyChart Information     [email or web address] lets you send messages to your doctor, view your test results, renew your prescriptions, schedule appointments and more. To sign up, go to [email or web address] . Click on \"Log in\" on the left side of the screen, which will take you to the Welcome page. Then click on \"Sign up Now\" on the right side of the page.     You will be asked to enter the access code listed below, as well as some personal information. Please follow the directions to create your username and password.     Your access code is: [email or web address]  Expires: 2017 11:50 AM     Your access code will  in 90 days. If you need help or a new code, please call your [place] clinic or [phone].      "   Care EveryWhere ID     This is your Care EveryWhere ID. This could be used by other organizations to access your [place] medical records  [id number]        Your Vitals Were     Pulse Respirations Last Period             76 12 01/03/2014          Blood Pressure from Last 3 Encounters:   10/11/17 112/78   09/20/17 113/82   08/14/17 110/70    Weight from Last 3 Encounters:   09/20/17 75.8 kg (167 lb)   08/14/17 76 kg (167 lb 8 oz)   07/14/17 75.7 kg (166 lb 12.8 oz)              We Performed the Following     CYSTOURETHROSCOPY     UA reflex to Microscopic and Culture     Urine Microscopic          Today's Medication Changes          These changes are accurate as of: 10/11/17  1:16 PM.  If you have any questions, ask your nurse or doctor.               Stop taking these medicines if you haven't already. Please contact your care team if you have questions.     gabapentin 100 MG capsule   Commonly known as:  NEURONTIN   Stopped by:  Carl De La O MD                    Primary Care Provider Office Phone # Fax #    Zach Corona [phone] [address] [facility] [address]        Equal Access to Services     [facility] [place] [place] [place]: Hadii susie jones hadasho Sojadaali, waaxda luqadaha, qaybta kaalmada aderexyada, charli vasquez. So [facility] [phone].    ATENCIÓN: Si habla español, tiene a chavez disposición servicios gratuitos de asistencia lingüística. [facility] [phone].    We comply with applicable federal civil rights laws and [place] laws. We do not discriminate on the basis of race, color, national origin, age, disability, sex, sexual orientation, or gender identity.            Thank you!     Thank you for choosing [facility]  for your care. Our goal is always to provide you with excellent care. Hearing back from our patients is one way we can continue to improve our services. Please take a few minutes to complete the written survey that you  may receive in the mail after your visit with us. Thank you!             Your Updated Medication List - Protect others around you: Learn how to safely use, store and throw away your medicines at www.disposemymeds.org.          This list is accurate as of: 10/11/17  1:17 PM.  Always use your most recent med list.                   Brand Name Dispense Instructions for use Diagnosis    acyclovir 400 MG tablet    ZOVIRAX    15 tablet    Take 1 tablet (400 mg) by mouth 3 times daily    Recurrent herpes simplex       amphetamine-dextroamphetamine 20 MG per tablet    ADDERALL    60 tablet    Take 1 tablet (20 mg) by mouth 2 times daily    Attention deficit hyperactivity disorder (ADHD), unspecified ADHD type       aspirin 325 MG EC tablet      Take  by mouth daily.        diclofenac 75 MG EC tablet    VOLTAREN    20 tablet    Take 1 tablet (75 mg) by mouth 2 times daily as needed for moderate pain    Chest wall pain       fexofenadine 180 MG tablet    ALLEGRA    30 tablet    Take 1 tablet (180 mg) by mouth daily    Seasonal allergic rhinitis       ibuprofen 800 MG tablet    ADVIL/MOTRIN    30 tablet    TAKE 1 TABLET(800 MG) BY MOUTH EVERY 8 HOURS AS NEEDED FOR MODERATE PAIN    Upper respiratory tract infection, unspecified type       ketotifen 0.025 % Soln ophthalmic solution    ZADITOR    1 Bottle    Place 1 drop into both eyes 2 times daily    Itchy eyes       nicotine 21 MG/24HR 24 hr patch    NICODERM CQ    30 patch    Place 1 patch onto the skin every 24 hours    Nicotine withdrawal       polyethylene glycol powder    MIRALAX    510 g    Take 17 g (1 capful) by mouth daily    Flatulence, eructation, and gas pain       sertraline 100 MG tablet    ZOLOFT    30 tablet    Start with 0.5tablet(50 mg) for 1 week then 1 tablet (100 mg) daily    MARY BETH (generalized anxiety disorder), Major depressive disorder, recurrent episode, moderate (H)       * valACYclovir 500 MG tablet    VALTREX    18 tablet    Take 1 tablet (500 mg) by  mouth 2 times daily    Recurrent cold sores       * valACYclovir 1000 mg tablet    VALTREX    20 tablet    Take 2 tablets (2,000 mg) by mouth 2 times daily    Herpes labialis       * Notice:  This list has 2 medication(s) that are the same as other medications prescribed for you. Read the directions carefully, and ask your doctor or other care provider to review them with you.

## 2017-10-11 NOTE — NURSING NOTE
"Chief Complaint   Patient presents with     Consult For     microhematuria       Initial /78 (BP Location: Right arm, Patient Position: Chair, Cuff Size: Adult Large)  Pulse 76  Resp 12  LMP 01/03/2014 Estimated body mass index is 25.39 kg/(m^2) as calculated from the following:    Height as of 9/20/17: 1.727 m (5' 8\").    Weight as of 9/20/17: 75.8 kg (167 lb).  Medication Reconciliation: complete   Clementina Sweet, CHAVA      "

## 2017-10-23 DIAGNOSIS — F33.1 MAJOR DEPRESSIVE DISORDER, RECURRENT EPISODE, MODERATE (H): ICD-10-CM

## 2017-10-23 DIAGNOSIS — F41.1 GAD (GENERALIZED ANXIETY DISORDER): ICD-10-CM

## 2017-10-25 RX ORDER — SERTRALINE HYDROCHLORIDE 100 MG/1
100 TABLET, FILM COATED ORAL DAILY
Qty: 30 TABLET | Refills: 0
Start: 2017-10-25

## 2017-10-25 NOTE — TELEPHONE ENCOUNTER
Routing refill request to provider for review/approval because:  Patient needs to be seen because:  Elevated PHQ-9 and medication just started on 9/20/17      Eugenie Gillespie RN - BC

## 2017-10-25 NOTE — TELEPHONE ENCOUNTER
Please complete PHQ9 over the phone then route back to RNs to refill the Sertraline x 1 month supply    Justyn Hayes RN

## 2017-10-31 RX ORDER — SERTRALINE HYDROCHLORIDE 100 MG/1
100 TABLET, FILM COATED ORAL DAILY
Qty: 30 TABLET | Refills: 0 | Status: SHIPPED | OUTPATIENT
Start: 2017-10-31 | End: 2017-11-14

## 2017-10-31 ASSESSMENT — PATIENT HEALTH QUESTIONNAIRE - PHQ9: SUM OF ALL RESPONSES TO PHQ QUESTIONS 1-9: 11

## 2017-10-31 NOTE — TELEPHONE ENCOUNTER
Called patient and did the PHQ9. She said she was having a hard time right now since her daughter and grandson moved in with her. She is going to make an appointment to come in and see Dr. Corona soon.  Kate Rubin CMA ([facility])

## 2017-10-31 NOTE — TELEPHONE ENCOUNTER
Called patient and she said she was at a neurology appointment and to call her in a hour to do questionnaire.  Kate Rubin CMA ([facility])

## 2017-11-08 ENCOUNTER — TELEPHONE (OUTPATIENT)
Dept: FAMILY MEDICINE | Facility: CLINIC | Age: 47
End: 2017-11-08

## 2017-11-08 DIAGNOSIS — F90.9 ATTENTION DEFICIT HYPERACTIVITY DISORDER (ADHD), UNSPECIFIED ADHD TYPE: Primary | ICD-10-CM

## 2017-11-08 NOTE — TELEPHONE ENCOUNTER
Controlled Substance Refill Request for amphetamine-dextroamphetamine (ADDERALL) 20 MG per tablet  Problem List Complete:  No     PROVIDER TO CONSIDER COMPLETION OF PROBLEM LIST AND OVERVIEW/CONTROLLED SUBSTANCE AGREEMENT    Last Written Prescription Date:  07/23/2017  Last Fill Quantity: 60,   # refills: 0    Last Office Visit with [facility] primary care provider: 09/20/2017    Future Office visit:     Controlled substance agreement on file: Yes:  Date 01/18/2017.     Processing:     checked in past 6 months?  No, route to ALISIA Germain MA

## 2017-11-08 NOTE — TELEPHONE ENCOUNTER
Left VM to speak to anyone on the purple team (left number). Patient has been been prescribed adderall since 05/13/2016 and seeing if she has gotten medication from somewhere else or if she is wanting to start medication back up. Crystal Ruiz MA

## 2017-11-08 NOTE — TELEPHONE ENCOUNTER
Reason for Call:  Other prescription    Detailed comments:  Marlin calling. She wants to get a refill of adderral at the  for . Please call and let know.     Phone Number Patient can be reached at: Home number on file [phone] (home)     Best Time:  Any     Can we leave a detailed message on this number? YES    Call taken on 11/8/2017 at 12:39 PM by Debbie Palacio

## 2017-11-08 NOTE — TELEPHONE ENCOUNTER
[place] reviewed:  reviewed- no concerns- last filled on 9/19/17    [place]  [email or web address]    Ashley Rodriguez RN

## 2017-11-10 RX ORDER — DEXTROAMPHETAMINE SACCHARATE, AMPHETAMINE ASPARTATE, DEXTROAMPHETAMINE SULFATE AND AMPHETAMINE SULFATE 5; 5; 5; 5 MG/1; MG/1; MG/1; MG/1
20 TABLET ORAL 2 TIMES DAILY
Qty: 60 TABLET | Refills: 0 | Status: SHIPPED | OUTPATIENT
Start: 2017-11-10 | End: 2017-12-20

## 2017-11-10 NOTE — TELEPHONE ENCOUNTER
Called and spoke with Marlin. Gave her the provider's message and she understood. She will  a the  on 11/13/2017.    Paper prescription is down at [facility]  ready for . Vani Mathias,

## 2017-11-14 ENCOUNTER — RADIANT APPOINTMENT (OUTPATIENT)
Dept: GENERAL RADIOLOGY | Facility: CLINIC | Age: 47
End: 2017-11-14
Attending: FAMILY MEDICINE
Payer: COMMERCIAL

## 2017-11-14 ENCOUNTER — OFFICE VISIT (OUTPATIENT)
Dept: FAMILY MEDICINE | Facility: CLINIC | Age: 47
End: 2017-11-14
Payer: COMMERCIAL

## 2017-11-14 ENCOUNTER — TELEPHONE (OUTPATIENT)
Dept: FAMILY MEDICINE | Facility: CLINIC | Age: 47
End: 2017-11-14

## 2017-11-14 VITALS
DIASTOLIC BLOOD PRESSURE: 76 MMHG | TEMPERATURE: 97 F | HEART RATE: 80 BPM | BODY MASS INDEX: 25.76 KG/M2 | HEIGHT: 68 IN | WEIGHT: 170 LBS | OXYGEN SATURATION: 99 % | SYSTOLIC BLOOD PRESSURE: 130 MMHG

## 2017-11-14 DIAGNOSIS — K59.00 CONSTIPATION, UNSPECIFIED CONSTIPATION TYPE: ICD-10-CM

## 2017-11-14 DIAGNOSIS — R10.84 ABDOMINAL PAIN, GENERALIZED: Primary | ICD-10-CM

## 2017-11-14 PROCEDURE — 74020 XR ABDOMEN 2 VW: CPT

## 2017-11-14 PROCEDURE — 99214 OFFICE O/P EST MOD 30 MIN: CPT | Performed by: FAMILY MEDICINE

## 2017-11-14 NOTE — MR AVS SNAPSHOT
After Visit Summary   11/14/2017    Marlin Lopez    MRN: [id number]           Patient Information     Date Of Birth          1970        Visit Information        Provider Department      11/14/2017 11:15 AM Milagros Watters MD [facility]        Today's Diagnoses     Abdominal pain, generalized    -  1    Constipation, unspecified constipation type          Care Instructions    [facility]    If you have any questions regarding to your visit please contact your care team:       Team Purple:   Clinic Hours Telephone Number   Dr. Milagros Briones   7am-7pm  Monday - Thursday   7am-5pm  Fridays  [phone]  (Appointment scheduling available 24/7)    Questions about your Visit?   Team Line:  [phone]   Urgent Care - [place] and [place] - 11am-9pm Monday-Friday Saturday-Sunday- 9am-5pm   [place] - 5pm-9pm Monday-Friday Saturday-Sunday- 9am-5pm  [phone] - [facility]  [phone] - [place]       What options do I have for visits at the clinic other than the traditional office visit?  To expand how we care for you, many of our providers are utilizing electronic visits (e-visits) and telephone visits, when medically appropriate, for interactions with their patients rather than a visit in the clinic.   We also offer nurse visits for many medical concerns. Just like any other service, we will bill your insurance company for this type of visit based on time spent on the phone with your provider. Not all insurance companies cover these visits. Please check with your medical insurance if this type of visit is covered. You will be responsible for any charges that are not paid by your insurance.      E-visits via OKKAM:  generally incur a $35.00 fee.  Telephone visits:  Time spent on the phone: *charged based on time that is spent on the phone in increments of 10 minutes. Estimated  "cost:   5-10 mins $30.00   11-20 mins. $59.00   21-30 mins. $85.00     Use Powderhookhart (secure email communication and access to your chart) to send your primary care provider a message or make an appointment. Ask someone on your Team how to sign up for [employer].  For a Price Quote for your services, please call our Momail Line at [phone].  As always, Thank you for trusting us with your health care needs!              Follow-ups after your visit        Who to contact     If you have questions or need follow up information about today's clinic visit or your schedule please contact [facility] directly at [phone].  Normal or non-critical lab and imaging results will be communicated to you by Powderhookhart, letter or phone within 4 business days after the clinic has received the results. If you do not hear from us within 7 days, please contact the clinic through [employer] or phone. If you have a critical or abnormal lab result, we will notify you by phone as soon as possible.  Submit refill requests through BioPetro[employer] or call your pharmacy and they will forward the refill request to us. Please allow 3 business days for your refill to be completed.          Additional Information About Your Visit        [employer] Information     [employer] lets you send messages to your doctor, view your test results, renew your prescriptions, schedule appointments and more. To sign up, go to [email or web address] . Click on \"Log in\" on the left side of the screen, which will take you to the Welcome page. Then click on \"Sign up Now\" on the right side of the page.     You will be asked to enter the access code listed below, as well as some personal information. Please follow the directions to create your username and password.     Your access code is: [email or web address]  Expires: 2018 12:14 PM     Your access code will  in 90 days. If you need help or a new code, please call your [place] clinic or [phone].      " "  Care EveryWhere ID     This is your Care EveryWhere ID. This could be used by other organizations to access your [facility] medical records  [id number]        Your Vitals Were     Pulse Temperature Height Last Period Pulse Oximetry BMI (Body Mass Index)    80 97  F (36.1  C) 5' 8\" (1.727 m) 01/03/2014 99% 25.85 kg/m2       Blood Pressure from Last 3 Encounters:   11/14/17 130/76   10/11/17 112/78   09/20/17 113/82    Weight from Last 3 Encounters:   11/14/17 170 lb (77.1 kg)   09/20/17 167 lb (75.8 kg)   08/14/17 167 lb 8 oz (76 kg)              We Performed the Following     XR Abdomen 2 Views        Primary Care Provider Office Phone # Fax #    Zach Corona [phone] [address] [facility] [address]        Equal Access to Services     [facility] : Hadii aad ku hadasho Soomaali, waaxda luqadaha, qaybta kaalmada adeegyada, waxay anain haychantelln dipti locke . So [facility] [phone].    ATENCIÓN: Si habla español, tiene a chavez disposición servicios gratuitos de asistencia lingüística. Llame al [phone].    We comply with applicable federal civil rights laws and [place] laws. We do not discriminate on the basis of race, color, national origin, age, disability, sex, sexual orientation, or gender identity.            Thank you!     Thank you for choosing [facility]  for your care. Our goal is always to provide you with excellent care. Hearing back from our patients is one way we can continue to improve our services. Please take a few minutes to complete the written survey that you may receive in the mail after your visit with us. Thank you!             Your Updated Medication List - Protect others around you: Learn how to safely use, store and throw away your medicines at www.disposemymeds.org.          This list is accurate as of: 11/14/17 12:14 PM.  Always use your most recent med list.                   Brand Name Dispense Instructions for use " Diagnosis    amphetamine-dextroamphetamine 20 MG per tablet    ADDERALL    60 tablet    Take 1 tablet (20 mg) by mouth 2 times daily    Attention deficit hyperactivity disorder (ADHD), unspecified ADHD type       aspirin 325 MG EC tablet      Take  by mouth daily.        diclofenac 75 MG EC tablet    VOLTAREN    20 tablet    Take 1 tablet (75 mg) by mouth 2 times daily as needed for moderate pain    Chest wall pain       fexofenadine 180 MG tablet    ALLEGRA    30 tablet    Take 1 tablet (180 mg) by mouth daily    Seasonal allergic rhinitis       ibuprofen 800 MG tablet    ADVIL/MOTRIN    30 tablet    TAKE 1 TABLET(800 MG) BY MOUTH EVERY 8 HOURS AS NEEDED FOR MODERATE PAIN    Upper respiratory tract infection, unspecified type       ketotifen 0.025 % Soln ophthalmic solution    ZADITOR    1 Bottle    Place 1 drop into both eyes 2 times daily    Itchy eyes       nicotine 21 MG/24HR 24 hr patch    NICODERM CQ    30 patch    Place 1 patch onto the skin every 24 hours    Nicotine withdrawal       polyethylene glycol powder    MIRALAX    510 g    Take 17 g (1 capful) by mouth daily    Flatulence, eructation, and gas pain       valACYclovir 1000 mg tablet    VALTREX    20 tablet    Take 2 tablets (2,000 mg) by mouth 2 times daily    Herpes labialis

## 2017-11-14 NOTE — TELEPHONE ENCOUNTER
"Reason for Call:  Other call back    Detailed comments: patient was seen today and would like to talk to Dr. Watters's team in regards her x-ray and her being \"backed up.\" Patient wants to know why Provider didn't recommend a colon cleanse. Please contact patient.    Phone Number Patient can be reached at: Home number on file [phone] (home)    Best Time: any time      Can we leave a detailed message on this number? YES    Call taken on 11/14/2017 at 5:35 PM by Sofia Bryan      "

## 2017-11-14 NOTE — PATIENT INSTRUCTIONS
[facility]    If you have any questions regarding to your visit please contact your care team:       Team Purple:   Clinic Hours Telephone Number   Dr. Milagros Briones   7am-7pm  Monday - Thursday   7am-5pm  Fridays  [phone]  (Appointment scheduling available 24/7)    Questions about your Visit?   Team Line:  [phone]   Urgent Care - [place] and [facility] - 11am-9pm Monday-Friday Saturday-Sunday- 9am-5pm   [place] - 5pm-9pm Monday-Friday Saturday-Sunday- 9am-5pm  [phone] - [facility]  [phone] - [place]       What options do I have for visits at the clinic other than the traditional office visit?  To expand how we care for you, many of our providers are utilizing electronic visits (e-visits) and telephone visits, when medically appropriate, for interactions with their patients rather than a visit in the clinic.   We also offer nurse visits for many medical concerns. Just like any other service, we will bill your insurance company for this type of visit based on time spent on the phone with your provider. Not all insurance companies cover these visits. Please check with your medical insurance if this type of visit is covered. You will be responsible for any charges that are not paid by your insurance.      E-visits via CareLuLu:  generally incur a $35.00 fee.  Telephone visits:  Time spent on the phone: *charged based on time that is spent on the phone in increments of 10 minutes. Estimated cost:   5-10 mins $30.00   11-20 mins. $59.00   21-30 mins. $85.00     Use [employer] (secure email communication and access to your chart) to send your primary care provider a message or make an appointment. Ask someone on your Team how to sign up for CareLuLu.  For a Price Quote for your services, please call our Consumer Price Line at [phone].  As always, Thank you for trusting us with your health care needs!

## 2017-11-14 NOTE — NURSING NOTE
"Chief Complaint   Patient presents with     Pain     Sharp pain under left breast       Initial /76 (BP Location: Right arm, Patient Position: Chair, Cuff Size: Adult Regular)  Pulse 80  Temp 97  F (36.1  C)  Ht 5' 8\" (1.727 m)  Wt 170 lb (77.1 kg)  LMP 01/03/2014  SpO2 99%  BMI 25.85 kg/m2 Estimated body mass index is 25.85 kg/(m^2) as calculated from the following:    Height as of this encounter: 5' 8\" (1.727 m).    Weight as of this encounter: 170 lb (77.1 kg).  Medication Reconciliation: complete   Gabrielle Pereira MA      "

## 2017-11-14 NOTE — LETTER
[address] [place] [address]    November 14, 2017    Marlin Lopez  [facility] [address] [place] [address]      Dear Marlin,    The radiologist agreed with me that constipation is the cause of your pain. I wish you improved health. Let me know if I can be of further help to you.     Enclosed is a copy of your results.   Results for orders placed or performed in visit on 11/14/17   XR Abdomen 2 Views    Narrative    XR ABDOMEN 2 VW 11/14/2017 12:00 PM    COMPARISON: None.    HISTORY: Generalized abdominal pain.      Impression    IMPRESSION: Large amount stool throughout the colon. Nonobstructive  bowel gas pattern without evidence of free air. Lung bases are clear.    KEVIN OCAMPO MD       If you have any questions or concerns, please call myself or my nurse at [phone].    Sincerely,    Milagros Watters MD/richar

## 2017-11-14 NOTE — PROGRESS NOTES
SUBJECTIVE:   Marlin Lopez is a 47 year old female who presents to clinic today for the following health issues:      Pain under left breast, has had before  Dizzy and lightheaded         Problem list and histories reviewed & adjusted, as indicated.  Additional history: as documented    Patient Active Problem List   Diagnosis     Anxiety state     Fibromyalgia     Sleep problems     Recurrent cold sores     Moderate major depression (H)     ADHD (attention deficit hyperactivity disorder)     CARDIOVASCULAR SCREENING; LDL GOAL LESS THAN 130     Family history of colon cancer     Allergic rhinitis     Renal cyst, left     Chronic constipation     Diverticulosis of large intestine without hemorrhage     Past Surgical History:   Procedure Laterality Date     C REMOVAL OF OVARY(S)  1992    Left, cyst     TONSILLECTOMY  1979       Social History   Substance Use Topics     Smoking status: Former Smoker     Packs/day: 0.50     Types: Cigarettes     Start date: 1/1/1980     Quit date: 9/20/2015     Smokeless tobacco: Never Used      Comment:  using e-cig daily     Alcohol use 0.0 oz/week     0 Standard drinks or equivalent per week      Comment:  drinks 3 days in a week, 2 beers each time      Family History   Problem Relation Age of Onset     Hypertension Maternal Grandmother      CEREBROVASCULAR DISEASE Maternal Grandmother      Alzheimer Disease Maternal Grandmother      Arthritis Maternal Grandmother      Obesity Maternal Grandmother      CANCER Other      Colon     Breast Cancer Other      Cancer - colorectal Paternal Grandfather          Current Outpatient Prescriptions   Medication Sig Dispense Refill     amphetamine-dextroamphetamine (ADDERALL) 20 MG per tablet Take 1 tablet (20 mg) by mouth 2 times daily 60 tablet 0     ibuprofen (ADVIL/MOTRIN) 800 MG tablet TAKE 1 TABLET(800 MG) BY MOUTH EVERY 8 HOURS AS NEEDED FOR MODERATE PAIN 30 tablet 0     valACYclovir (VALTREX) 1000 mg tablet Take 2 tablets (2,000 mg)  by mouth 2 times daily 20 tablet 0     diclofenac (VOLTAREN) 75 MG EC tablet Take 1 tablet (75 mg) by mouth 2 times daily as needed for moderate pain 20 tablet 0     ketotifen (ZADITOR) 0.025 % SOLN ophthalmic solution Place 1 drop into both eyes 2 times daily 1 Bottle 1     fexofenadine (ALLEGRA) 180 MG tablet Take 1 tablet (180 mg) by mouth daily 30 tablet 1     polyethylene glycol (MIRALAX) powder Take 17 g (1 capful) by mouth daily 510 g 1     nicotine (NICODERM CQ) 21 MG/24HR patch 2h hr Place 1 patch onto the skin every 24 hours 30 patch 4     aspirin 325 MG EC tablet Take  by mouth daily.       [DISCONTINUED] valACYclovir (VALTREX) 500 MG tablet Take 1 tablet (500 mg) by mouth 2 times daily (Patient not taking: Reported on 11/14/2017) 18 tablet 3     Allergies   Allergen Reactions     Cats Itching     Eyes, Sneezy,     Codeine Nausea and Vomiting     BP Readings from Last 3 Encounters:   11/14/17 130/76   10/11/17 112/78   09/20/17 113/82    Wt Readings from Last 3 Encounters:   11/14/17 170 lb (77.1 kg)   09/20/17 167 lb (75.8 kg)   08/14/17 167 lb 8 oz (76 kg)                  Labs reviewed in EPIC        Reviewed and updated as needed this visit by clinical staffTobacco  Allergies  Meds  Med Hx  Surg Hx  Fam Hx  Soc Hx      Reviewed and updated as needed this visit by Provider         ROS:  This 47 year old female is here today because she has suffered from intermittent severe pain under her left ribs off and on for many years. She has had scans and x-rays done. About 1 year ago cat scan showed a mass in her uterus. Ultrasound revealed a polyp and she underwent a small surgery to remove a polyp from her uterus. She works 3 different part time jobs and is very busy and stressed. She has a long history of constipation, but says that she does have a bowel movement daily. Her daughter was recently diagnosed with severe constipation also. Patient tries hard to not eat sticky starchy foods. Feels she has a  "high fiber diet.  Mirilax has never helped her constipaiton.  Her pain is gone when she lies flat in bed. However, when she rolls onto her left side, the pain is so bad, it takes her breath away. She is not short of breath and deep breaths do not worsen her pain. She feels like she has been gaining some weight lately. She also feels very gassy and gax-ex doesn't help.  No nausea or fever. She had a normal colonoscopy 2 years ago. Screen due to family history of colon cancer.  All other review of systems are negative  Personal, family, and social history reviewed with patient and revised.         OBJECTIVE:     /76 (BP Location: Right arm, Patient Position: Chair, Cuff Size: Adult Regular)  Pulse 80  Temp 97  F (36.1  C)  Ht 5' 8\" (1.727 m)  Wt 170 lb (77.1 kg)  LMP 01/03/2014  SpO2 99%  BMI 25.85 kg/m2  Body mass index is 25.85 kg/(m^2).  GENERAL: healthy, alert and no distress  NECK: no adenopathy, no asymmetry, masses, or scars and thyroid normal to palpation  RESP: lungs clear to auscultation - no rales, rhonchi or wheezes  CV: regular rate and rhythm, normal S1 S2, no S3 or S4, no murmur, click or rub, no peripheral edema and peripheral pulses strong  ABDOMEN: soft, nontender, no hepatosplenomegaly, no masses and bowel sounds normal, but she points directly under her left ribs and in her left upper abdomen where her pain is. I was able to find one spot when I pushed deeply, just lateral to her left midline that was tender, but it was hard to reproduce. She was able to sit up by herself, but that movement caused discomfort. When she was sitting, she was comfortable.   MS: no gross musculoskeletal defects noted, no edema  He gait was normal and brisk and comfortable. Able to stand up straight   Well hydrated  Well nourished  Well groomed  Alert and oriented X 3  Good spirits    Diagnostic Test Results:  none     ASSESSMENT/PLAN:              1. Abdominal pain, generalized  X-ray shows a lot of " constipation, especially in her left upper abdomen at the splenic flexure.   - XR Abdomen 2 Views    2. Constipation, unspecified constipation type  As above, we discussed this in detail  Recommend that she drink a bottle of mag citrate today  She must oil her colon daily with either lots of olive oil or mineral oil in OJ  She should plan to use mirilax 3 X a day   Continue high fiber diet.         Return to clinic if no improvement     JEREMÍAS BARILLAS MD  [facility]

## 2017-11-15 NOTE — TELEPHONE ENCOUNTER
Call her. Mag citrate is basically a colon cleanse but it is safer for her electrolytes.     JEREMÍAS BARILLAS M.D.

## 2017-12-18 NOTE — PROGRESS NOTES
SUBJECTIVE:   Marlin Lopez is a 47 year old female who presents to clinic today for the following health issues:    Patient presents with:  A.D.H.D  RECHECK: Medication     Medication Followup of amphetamine-dextroamphetamine (ADDERALL) 20 MG per tablet    Taking Medication as prescribed: yes    Side Effects:  None    Medication Helping Symptoms:  yes     Chronic Pain and Fibromyalgia:   Been dealing with pain issues for a long time   Due to follow up with [facility] and will be going to [facility].    ADHD Follow-Up    Date of last ADHD office visit: 5/23/2017  Status since last visit: Stable  Taking controlled (daily) medications as prescribed: Yes                       Parent/Patient Concerns with Medications: None    Sleep: trouble staying asleep, due to pain issues  Home/Family Concerns: None  Peer Concerns: None    Co-Morbid Diagnosis: Depression    Currently in counseling: Yes      Medication Benefits:   Controlled symptoms: Attention span, Distractability, Finishing tasks, Impulse control and Frustration tolerance  Uncontrolled symptoms: None    Medication side effects:  Side effects noted: none  Denies: appetite suppression, weight loss, insomnia, palpitations, stomach ache, headache, emotional lability, rebound irritability, drowsiness and dry mouth    Migraine Follow-Up    Starting Topamax,     Headaches symptoms:  Stable     Frequency: Daily    Duration of headaches: varies    Able to do normal daily activities/work with migraines:Sometimes    Rescue/Relief medication:ibuprofen (Advil, Motrin)              Effectiveness: minor relief    Preventative medication: Starting Topamax    Neurologic complications: No new stroke-like symptoms, loss of vision or speech, numbness or weakness    In the past 4 weeks, how often have you gone to Urgent Care or the emergency room because of your headaches?  0    Sore throat:    Grand kids have a virus going.    Problem list and histories reviewed & adjusted, as  indicated.  Additional history: as documented    Patient Active Problem List   Diagnosis     Anxiety state     Fibromyalgia     Sleep problems     Recurrent cold sores     Moderate major depression (H)     ADHD (attention deficit hyperactivity disorder)     CARDIOVASCULAR SCREENING; LDL GOAL LESS THAN 130     Family history of colon cancer     Allergic rhinitis     Renal cyst, left     Chronic constipation     Diverticulosis of large intestine without hemorrhage     Past Surgical History:   Procedure Laterality Date     C REMOVAL OF OVARY(S)  1992    Left, cyst     TONSILLECTOMY  1979       Social History   Substance Use Topics     Smoking status: Former Smoker     Packs/day: 0.50     Types: Cigarettes     Start date: 1/1/1980     Quit date: 9/20/2015     Smokeless tobacco: Never Used      Comment:  using e-cig daily     Alcohol use 0.0 oz/week     0 Standard drinks or equivalent per week      Comment:  drinks 3 days in a week, 2 beers each time      Family History   Problem Relation Age of Onset     Hypertension Maternal Grandmother      CEREBROVASCULAR DISEASE Maternal Grandmother      Alzheimer Disease Maternal Grandmother      Arthritis Maternal Grandmother      Obesity Maternal Grandmother      CANCER Other      Colon     Breast Cancer Other      Cancer - colorectal Paternal Grandfather          Reviewed and updated as needed this visit by Provider      ROS:  Constitutional, HEENT, cardiovascular, pulmonary, gi and gu systems are negative, except as otherwise noted.      OBJECTIVE:   /76  Pulse 86  Temp 96.9  F (36.1  C) (Oral)  Wt 171 lb 8 oz (77.8 kg)  LMP 01/03/2014  SpO2 98%  BMI 26.08 kg/m2  Body mass index is 26.08 kg/(m^2).  GENERAL: whiny, alert and no distress  NECK: no adenopathy and thyroid normal to palpation  RESP: lungs clear to auscultation - no rales, rhonchi or wheezes  CV: regular rate and rhythm, normal S1 S2, no S3 or S4, no murmur, click or rub, no peripheral edema  ABDOMEN:  soft, nontender, no hepatosplenomegaly, no masses and bowel sounds normal  MS: no gross musculoskeletal defects noted, no edema  NEURO: Normal strength and tone, mentation intact and speech normal  PSYCH: mentation appears normal, affect flat    Diagnostic Test Results:  none     ASSESSMENT/PLAN:   (F90.9) Attention deficit hyperactivity disorder (ADHD), unspecified ADHD type  (primary encounter diagnosis)  Comment: Stable   Plan: amphetamine-dextroamphetamine (ADDERALL) 20 MG         per tablet, DISCONTINUED:         amphetamine-dextroamphetamine (ADDERALL) 20 MG         per tablet, DISCONTINUED:         amphetamine-dextroamphetamine (ADDERALL) 20 MG         per tablet    (F32.1) Moderate major depression (H)  Comment: Been taking Zoloft but wants to try some Cymbalta that she has. Unemployed and due to issues with pain, fibromyalgia and depression not able to work consistently at this time.   Plan:     (M79.7) Fibromyalgia  Comment: Has some Cymbalta which intends to resume taking and has upcoming appointment with [facility]  Plan: Cymbalta, PT,    ibuprofen (ADVIL/MOTRIN) 800 MG tablet    (J06.9) Upper respiratory tract infection, unspecified type  Comment: Discussed general respiratory tract infection care including importance of hydration, rest, over the counter therapies and techniques to prevent future infection as well as transmission to others.  Discussed signs or symptoms that would indicate need for recheck.    (H57.8) Itchy eyes  Plan: ketotifen (ZADITOR) 0.025 % SOLN ophthalmic         solution    Follow up in 3 months or sooner with concerns    Zach Corona MD  [facility]

## 2017-12-20 ENCOUNTER — OFFICE VISIT (OUTPATIENT)
Dept: FAMILY MEDICINE | Facility: CLINIC | Age: 47
End: 2017-12-20
Payer: COMMERCIAL

## 2017-12-20 VITALS
BODY MASS INDEX: 26.08 KG/M2 | SYSTOLIC BLOOD PRESSURE: 124 MMHG | WEIGHT: 171.5 LBS | TEMPERATURE: 96.9 F | OXYGEN SATURATION: 98 % | HEART RATE: 86 BPM | DIASTOLIC BLOOD PRESSURE: 76 MMHG

## 2017-12-20 DIAGNOSIS — J06.9 UPPER RESPIRATORY TRACT INFECTION, UNSPECIFIED TYPE: ICD-10-CM

## 2017-12-20 DIAGNOSIS — Z23 NEED FOR PROPHYLACTIC VACCINATION AND INOCULATION AGAINST INFLUENZA: ICD-10-CM

## 2017-12-20 DIAGNOSIS — M79.7 FIBROMYALGIA: ICD-10-CM

## 2017-12-20 DIAGNOSIS — F32.1 MODERATE MAJOR DEPRESSION (H): ICD-10-CM

## 2017-12-20 DIAGNOSIS — H57.9 ITCHY EYES: ICD-10-CM

## 2017-12-20 DIAGNOSIS — F90.9 ATTENTION DEFICIT HYPERACTIVITY DISORDER (ADHD), UNSPECIFIED ADHD TYPE: Primary | ICD-10-CM

## 2017-12-20 PROCEDURE — 99214 OFFICE O/P EST MOD 30 MIN: CPT | Performed by: FAMILY MEDICINE

## 2017-12-20 RX ORDER — DEXTROAMPHETAMINE SACCHARATE, AMPHETAMINE ASPARTATE, DEXTROAMPHETAMINE SULFATE AND AMPHETAMINE SULFATE 5; 5; 5; 5 MG/1; MG/1; MG/1; MG/1
20 TABLET ORAL 2 TIMES DAILY
Qty: 60 TABLET | Refills: 0 | Status: SHIPPED | OUTPATIENT
Start: 2018-01-20 | End: 2017-12-20

## 2017-12-20 RX ORDER — DEXTROAMPHETAMINE SACCHARATE, AMPHETAMINE ASPARTATE, DEXTROAMPHETAMINE SULFATE AND AMPHETAMINE SULFATE 5; 5; 5; 5 MG/1; MG/1; MG/1; MG/1
20 TABLET ORAL 2 TIMES DAILY
Qty: 60 TABLET | Refills: 0 | Status: SHIPPED | OUTPATIENT
Start: 2018-02-20 | End: 2018-04-06

## 2017-12-20 RX ORDER — DEXTROAMPHETAMINE SACCHARATE, AMPHETAMINE ASPARTATE, DEXTROAMPHETAMINE SULFATE AND AMPHETAMINE SULFATE 5; 5; 5; 5 MG/1; MG/1; MG/1; MG/1
20 TABLET ORAL 2 TIMES DAILY
Qty: 60 TABLET | Refills: 0 | Status: SHIPPED | OUTPATIENT
Start: 2017-12-20 | End: 2017-12-20

## 2017-12-20 RX ORDER — IBUPROFEN 800 MG/1
TABLET, FILM COATED ORAL
Qty: 30 TABLET | Refills: 0 | Status: SHIPPED | OUTPATIENT
Start: 2017-12-20 | End: 2018-02-06

## 2017-12-20 NOTE — MR AVS SNAPSHOT
After Visit Summary   12/20/2017    Marlin Lopez    MRN: [id number]           Patient Information     Date Of Birth          1970        Visit Information        Provider Department      12/20/2017 12:40 PM Zach Corona MD [facility]        Today's Diagnoses     Attention deficit hyperactivity disorder (ADHD), unspecified ADHD type    -  1    Upper respiratory tract infection, unspecified type        Itchy eyes        Fibromyalgia        Need for prophylactic vaccination and inoculation against influenza          Care Instructions    [facility]    If you have any questions regarding to your visit please contact your care team:       Team Purple:   Clinic Hours Telephone Number   Dr. Milagros Briones   7am-7pm  Monday - Thursday   7am-5pm  Fridays  [phone]  (Appointment scheduling available 24/7)    Questions about your Visit?   Team Line:  [phone]   Urgent Care - [place] [facility] - 11am-9pm Monday-Friday Saturday-Sunday- 9am-5pm   [place] - 5pm-9pm Monday-Friday Saturday-Sunday- 9am-5pm  [phone] - [place]   [phone] - [place]       What options do I have for visits at the clinic other than the traditional office visit?  To expand how we care for you, many of our providers are utilizing electronic visits (e-visits) and telephone visits, when medically appropriate, for interactions with their patients rather than a visit in the clinic.   We also offer nurse visits for many medical concerns. Just like any other service, we will bill your insurance company for this type of visit based on time spent on the phone with your provider. Not all insurance companies cover these visits. Please check with your medical insurance if this type of visit is covered. You will be responsible for any charges that are not paid by your insurance.      E-visits via On The Flea:   "generally incur a $35.00 fee.  Telephone visits:  Time spent on the phone: *charged based on time that is spent on the phone in increments of 10 minutes. Estimated cost:   5-10 mins $30.00   11-20 mins. $59.00   21-30 mins. $85.00     Use EduRisehart (secure email communication and access to your chart) to send your primary care provider a message or make an appointment. Ask someone on your Team how to sign up for Trivopt.  For a Price Quote for your services, please call our [employer] Line at [phone].  As always, Thank you for trusting us with your health care needs!    Discharged By: An            Follow-ups after your visit        Who to contact     If you have questions or need follow up information about today's clinic visit or your schedule please contact [facility] directly at [phone].  Normal or non-critical lab and imaging results will be communicated to you by EduRisehart, letter or phone within 4 business days after the clinic has received the results. If you do not hear from us within 7 days, please contact the clinic through EduRisehart or phone. If you have a critical or abnormal lab result, we will notify you by phone as soon as possible.  Submit refill requests through Mysterio or call your pharmacy and they will forward the refill request to us. Please allow 3 business days for your refill to be completed.          Additional Information About Your Visit        Mysterio Information     Mysterio lets you send messages to your doctor, view your test results, renew your prescriptions, schedule appointments and more. To sign up, go to [email or web address] . Click on \"Log in\" on the left side of the screen, which will take you to the Welcome page. Then click on \"Sign up Now\" on the right side of the page.     You will be asked to enter the access code listed below, as well as some personal information. Please follow the directions to create your username and password.     Your access " code is: [email or web address]  Expires: 2018 12:14 PM     Your access code will  in 90 days. If you need help or a new code, please call your [place] clinic or [phone].        Care EveryWhere ID     This is your Care EveryWhere ID. This could be used by other organizations to access your [place] medical records  [id number]        Your Vitals Were     Pulse Temperature Last Period Pulse Oximetry BMI (Body Mass Index)       86 96.9  F (36.1  C) (Oral) 2014 98% 26.08 kg/m2        Blood Pressure from Last 3 Encounters:   17 124/76   17 130/76   10/11/17 112/78    Weight from Last 3 Encounters:   17 171 lb 8 oz (77.8 kg)   17 170 lb (77.1 kg)   17 167 lb (75.8 kg)              Today, you had the following     No orders found for display         Today's Medication Changes          These changes are accurate as of: 17  1:27 PM.  If you have any questions, ask your nurse or doctor.               Start taking these medicines.        Dose/Directions    amphetamine-dextroamphetamine 20 MG per tablet   Commonly known as:  ADDERALL   Used for:  Attention deficit hyperactivity disorder (ADHD), unspecified ADHD type   Started by:  Zach Corona MD        Dose:  20 mg   Start taking on:  2018   Take 1 tablet (20 mg) by mouth 2 times daily   Quantity:  60 tablet   Refills:  0            Where to get your medicines      These medications were sent to [id number]  [facility] - [address] AT [facility] [address]   [address] [place] [address]     Phone:  [phone]     ibuprofen 800 MG tablet    ketotifen 0.025 % Soln ophthalmic solution         Some of these will need a paper prescription and others can be bought over the counter.  Ask your nurse if you have questions.     Bring a paper prescription for each of these medications     amphetamine-dextroamphetamine 20 MG per tablet                Primary Care Provider Office Phone # Fax  #    Zach Corona [phone]       [address] [facility]  [facility] [address]        Equal Access to Services     NAFISA TONY : Hadcarin susie jones patricia Somorgan, wathadda luqadaha, qaybta kabjda ethan, charli hinton laomayraobed christina. So [facility] [phone].    ATENCIÓN: Si habla español, tiene a chavez disposición servicios gratuitos de asistencia lingüística. Llame al [phone].    We comply with applicable federal civil rights laws and [place] laws. We do not discriminate on the basis of race, color, national origin, age, disability, sex, sexual orientation, or gender identity.            Thank you!     Thank you for choosing [facility]  for your care. Our goal is always to provide you with excellent care. Hearing back from our patients is one way we can continue to improve our services. Please take a few minutes to complete the written survey that you may receive in the mail after your visit with us. Thank you!             Your Updated Medication List - Protect others around you: Learn how to safely use, store and throw away your medicines at www.disposemymeds.org.          This list is accurate as of: 12/20/17  1:27 PM.  Always use your most recent med list.                   Brand Name Dispense Instructions for use Diagnosis    amphetamine-dextroamphetamine 20 MG per tablet   Start taking on:  2/20/2018    ADDERALL    60 tablet    Take 1 tablet (20 mg) by mouth 2 times daily    Attention deficit hyperactivity disorder (ADHD), unspecified ADHD type       aspirin 325 MG EC tablet      Take  by mouth daily.        diclofenac 75 MG EC tablet    VOLTAREN    20 tablet    Take 1 tablet (75 mg) by mouth 2 times daily as needed for moderate pain    Chest wall pain       fexofenadine 180 MG tablet    ALLEGRA    30 tablet    Take 1 tablet (180 mg) by mouth daily    Seasonal allergic rhinitis       ibuprofen 800 MG tablet    ADVIL/MOTRIN    30 tablet    TAKE 1 TABLET(800  MG) BY MOUTH EVERY 8 HOURS AS NEEDED FOR MODERATE PAIN    Upper respiratory tract infection, unspecified type       ketotifen 0.025 % Soln ophthalmic solution    ZADITOR    1 Bottle    Place 1 drop into both eyes 2 times daily    Itchy eyes       nicotine 21 MG/24HR 24 hr patch    NICODERM CQ    30 patch    Place 1 patch onto the skin every 24 hours    Nicotine withdrawal       polyethylene glycol powder    MIRALAX    510 g    Take 17 g (1 capful) by mouth daily    Flatulence, eructation, and gas pain       valACYclovir 1000 mg tablet    VALTREX    20 tablet    Take 2 tablets (2,000 mg) by mouth 2 times daily    Herpes labialis

## 2017-12-20 NOTE — NURSING NOTE
"Chief Complaint   Patient presents with     A.D.H.D     RECHECK     Medication        Initial /76  Pulse 86  Temp 96.9  F (36.1  C) (Oral)  Wt 171 lb 8 oz (77.8 kg)  LMP 01/03/2014  SpO2 98%  BMI 26.08 kg/m2 Estimated body mass index is 26.08 kg/(m^2) as calculated from the following:    Height as of 11/14/17: 5' 8\" (1.727 m).    Weight as of this encounter: 171 lb 8 oz (77.8 kg).  Medication Reconciliation: complete   Brittney Sanchez MA      "

## 2017-12-20 NOTE — PATIENT INSTRUCTIONS
[facility]    If you have any questions regarding to your visit please contact your care team:       Team Purple:   Clinic Hours Telephone Number   Dr. Milagros Briones   7am-7pm  Monday - Thursday   7am-5pm  Fridays  [phone]  (Appointment scheduling available 24/7)    Questions about your Visit?   Team Line:  [phone]   Urgent Care - [place] and [facility] - 11am-9pm Monday-Friday Saturday-Sunday- 9am-5pm   [place] - 5pm-9pm Monday-Friday Saturday-Sunday- 9am-5pm  [phone] - [facility]  [phone] - [place]       What options do I have for visits at the clinic other than the traditional office visit?  To expand how we care for you, many of our providers are utilizing electronic visits (e-visits) and telephone visits, when medically appropriate, for interactions with their patients rather than a visit in the clinic.   We also offer nurse visits for many medical concerns. Just like any other service, we will bill your insurance company for this type of visit based on time spent on the phone with your provider. Not all insurance companies cover these visits. Please check with your medical insurance if this type of visit is covered. You will be responsible for any charges that are not paid by your insurance.      E-visits via Camrivox:  generally incur a $35.00 fee.  Telephone visits:  Time spent on the phone: *charged based on time that is spent on the phone in increments of 10 minutes. Estimated cost:   5-10 mins $30.00   11-20 mins. $59.00   21-30 mins. $85.00     Use Buzzmetricshart (secure email communication and access to your chart) to send your primary care provider a message or make an appointment. Ask someone on your Team how to sign up for Camrivox.  For a Price Quote for your services, please call our Consumer Price Line at [phone].  As always, Thank you for trusting us with your health care  needs!    Discharged By: An

## 2018-01-23 ENCOUNTER — TELEPHONE (OUTPATIENT)
Dept: FAMILY MEDICINE | Facility: CLINIC | Age: 48
End: 2018-01-23

## 2018-01-23 DIAGNOSIS — N83.201 CYST OF RIGHT OVARY: ICD-10-CM

## 2018-01-23 DIAGNOSIS — G47.00 INSOMNIA, UNSPECIFIED TYPE: Primary | ICD-10-CM

## 2018-01-23 NOTE — TELEPHONE ENCOUNTER
Ultrasound:  The ultrasound (from outside) in record is from 12/2016; which showed a complex right ovarian cyst and evaluation by Gyn was recommended. Where will she like to have the ultrasound done?     Medication Refill:   Tramadol is a controlled substance and is not recommended for long term managament of pain unless recommended by a pain clinic/specialist.  Trazodone was self reported in 4/2012 and discontinued not sure why.

## 2018-01-23 NOTE — TELEPHONE ENCOUNTER
See message below, can patient have phone visit for this or does patient need office visit?  Please advise   Ashley Rodriguez RN

## 2018-01-23 NOTE — TELEPHONE ENCOUNTER
Reason for call:med refill and order request for Ultra sound  Patient called regarding (reason for call):She used to take trazadone and tramadahl. She would like to start them back up for her pain. She would like an order to be put in to look at her cyst on her ovaries and kidney.   Additional comments: Please call patient to discuss further      Phone number to reach patient:  Home number on file [phone] (home)    Best Time:  anytime    Can we leave a detailed message on this number?  YES

## 2018-01-24 NOTE — TELEPHONE ENCOUNTER
Left message on answering machine for patient to call back to the RN hotline at [phone].    Clementina Hartley RN  [facility]

## 2018-01-26 NOTE — TELEPHONE ENCOUNTER
Pt called back and left a message on RN hotline asking for a call back and if she does not answer, wants a detailed message on her vm.    Spoke with pt. Explained to pt that the provider had some additional questions for pt, so that is why a detailed message was not left on vm. Gave her provider's information. She would like to have the US done at the [facility] in [place].    She stopped taking the trazodone because she get's tired of taking medications.   She is now in pain and is not sleeping, so wondering if she can get the trazodone refilled.     She is starting an intense program through the [facility] [place] for her Fibromyalgia.    Please advise.    Clementina Hartley RN  [facility]

## 2018-01-26 NOTE — TELEPHONE ENCOUNTER
Pt called RN hotline back at [phone] and left a message.    Left message on answering machine for patient to call back to the RN hotline at [phone].    Clementina Hartley RN  [facility]

## 2018-01-29 RX ORDER — TRAZODONE HYDROCHLORIDE 50 MG/1
50 TABLET, FILM COATED ORAL AT BEDTIME
Qty: 30 TABLET | Refills: 2 | Status: SHIPPED | OUTPATIENT
Start: 2018-01-29 | End: 2018-04-06

## 2018-01-29 NOTE — TELEPHONE ENCOUNTER
Please fax US order to [facility]- encounter can then be closed    Patient updated on US order and Trazodone prescription    Justyn Hayes RN

## 2018-02-05 ENCOUNTER — HOSPITAL ENCOUNTER (EMERGENCY)
Facility: CLINIC | Age: 48
Discharge: HOME | End: 2018-02-05
Attending: EMERGENCY MEDICINE | Admitting: EMERGENCY MEDICINE
Payer: COMMERCIAL

## 2018-02-05 ENCOUNTER — APPOINTMENT (OUTPATIENT)
Dept: CT IMAGING | Facility: CLINIC | Age: 48
End: 2018-02-05
Attending: EMERGENCY MEDICINE
Payer: COMMERCIAL

## 2018-02-05 VITALS
BODY MASS INDEX: 25.76 KG/M2 | OXYGEN SATURATION: 97 % | TEMPERATURE: 97.9 F | DIASTOLIC BLOOD PRESSURE: 77 MMHG | HEART RATE: 62 BPM | RESPIRATION RATE: 20 BRPM | WEIGHT: 170 LBS | SYSTOLIC BLOOD PRESSURE: 117 MMHG | HEIGHT: 68 IN

## 2018-02-05 DIAGNOSIS — K52.9 COLITIS: ICD-10-CM

## 2018-02-05 DIAGNOSIS — R10.84 ABDOMINAL PAIN, GENERALIZED: ICD-10-CM

## 2018-02-05 LAB
ALBUMIN SERPL-MCNC: 3.6 G/DL (ref 3.4–5)
ALBUMIN UR-MCNC: NEGATIVE MG/DL
ALP SERPL-CCNC: 71 U/L (ref 40–150)
ALT SERPL W P-5'-P-CCNC: 18 U/L (ref 0–50)
ANION GAP SERPL CALCULATED.3IONS-SCNC: 10 MMOL/L (ref 3–14)
APPEARANCE UR: CLEAR
AST SERPL W P-5'-P-CCNC: 12 U/L (ref 0–45)
BASOPHILS # BLD AUTO: 0.1 10E9/L (ref 0–0.2)
BASOPHILS NFR BLD AUTO: 0.6 %
BILIRUB SERPL-MCNC: 0.6 MG/DL (ref 0.2–1.3)
BILIRUB UR QL STRIP: NEGATIVE
BUN SERPL-MCNC: 14 MG/DL (ref 7–30)
CALCIUM SERPL-MCNC: 8.7 MG/DL (ref 8.5–10.1)
CHLORIDE SERPL-SCNC: 112 MMOL/L (ref 94–109)
CO2 SERPL-SCNC: 22 MMOL/L (ref 20–32)
COLOR UR AUTO: YELLOW
CREAT SERPL-MCNC: 0.64 MG/DL (ref 0.52–1.04)
DIFFERENTIAL METHOD BLD: ABNORMAL
EOSINOPHIL # BLD AUTO: 0.2 10E9/L (ref 0–0.7)
EOSINOPHIL NFR BLD AUTO: 1.8 %
ERYTHROCYTE [DISTWIDTH] IN BLOOD BY AUTOMATED COUNT: 12.3 % (ref 10–15)
GFR SERPL CREATININE-BSD FRML MDRD: >90 ML/MIN/1.7M2
GLUCOSE SERPL-MCNC: 100 MG/DL (ref 70–99)
GLUCOSE UR STRIP-MCNC: NEGATIVE MG/DL
HCT VFR BLD AUTO: 37.7 % (ref 35–47)
HGB BLD-MCNC: 12.8 G/DL (ref 11.7–15.7)
HGB UR QL STRIP: NEGATIVE
IMM GRANULOCYTES # BLD: 0 10E9/L (ref 0–0.4)
IMM GRANULOCYTES NFR BLD: 0.3 %
KETONES UR STRIP-MCNC: NEGATIVE MG/DL
LEUKOCYTE ESTERASE UR QL STRIP: NEGATIVE
LIPASE SERPL-CCNC: 128 U/L (ref 73–393)
LYMPHOCYTES # BLD AUTO: 0.9 10E9/L (ref 0.8–5.3)
LYMPHOCYTES NFR BLD AUTO: 8.4 %
MCH RBC QN AUTO: 30.3 PG (ref 26.5–33)
MCHC RBC AUTO-ENTMCNC: 34 G/DL (ref 31.5–36.5)
MCV RBC AUTO: 89 FL (ref 78–100)
MONOCYTES # BLD AUTO: 0.5 10E9/L (ref 0–1.3)
MONOCYTES NFR BLD AUTO: 4.8 %
MUCOUS THREADS #/AREA URNS LPF: PRESENT /LPF
NEUTROPHILS # BLD AUTO: 8.6 10E9/L (ref 1.6–8.3)
NEUTROPHILS NFR BLD AUTO: 84.1 %
NITRATE UR QL: NEGATIVE
NRBC # BLD AUTO: 0 10*3/UL
NRBC BLD AUTO-RTO: 0 /100
PH UR STRIP: 6 PH (ref 5–7)
PLATELET # BLD AUTO: 303 10E9/L (ref 150–450)
POTASSIUM SERPL-SCNC: 3.7 MMOL/L (ref 3.4–5.3)
PROT SERPL-MCNC: 6.5 G/DL (ref 6.8–8.8)
RBC # BLD AUTO: 4.22 10E12/L (ref 3.8–5.2)
RBC #/AREA URNS AUTO: 5 /HPF (ref 0–2)
SODIUM SERPL-SCNC: 144 MMOL/L (ref 133–144)
SOURCE: ABNORMAL
SP GR UR STRIP: 1 (ref 1–1.03)
SQUAMOUS #/AREA URNS AUTO: 1 /HPF (ref 0–1)
UROBILINOGEN UR STRIP-MCNC: 0 MG/DL (ref 0–2)
WBC # BLD AUTO: 10.3 10E9/L (ref 4–11)
WBC #/AREA URNS AUTO: 1 /HPF (ref 0–2)

## 2018-02-05 PROCEDURE — 96375 TX/PRO/DX INJ NEW DRUG ADDON: CPT

## 2018-02-05 PROCEDURE — 81001 URINALYSIS AUTO W/SCOPE: CPT | Performed by: EMERGENCY MEDICINE

## 2018-02-05 PROCEDURE — 25000132 ZZH RX MED GY IP 250 OP 250 PS 637: Performed by: EMERGENCY MEDICINE

## 2018-02-05 PROCEDURE — 99285 EMERGENCY DEPT VISIT HI MDM: CPT | Mod: 25

## 2018-02-05 PROCEDURE — 96361 HYDRATE IV INFUSION ADD-ON: CPT

## 2018-02-05 PROCEDURE — 80053 COMPREHEN METABOLIC PANEL: CPT | Performed by: EMERGENCY MEDICINE

## 2018-02-05 PROCEDURE — 25000128 H RX IP 250 OP 636: Performed by: EMERGENCY MEDICINE

## 2018-02-05 PROCEDURE — 74177 CT ABD & PELVIS W/CONTRAST: CPT

## 2018-02-05 PROCEDURE — 85025 COMPLETE CBC W/AUTO DIFF WBC: CPT | Performed by: EMERGENCY MEDICINE

## 2018-02-05 PROCEDURE — 83690 ASSAY OF LIPASE: CPT | Performed by: EMERGENCY MEDICINE

## 2018-02-05 PROCEDURE — 96374 THER/PROPH/DIAG INJ IV PUSH: CPT | Mod: 59

## 2018-02-05 RX ORDER — OXYCODONE HYDROCHLORIDE 5 MG/1
5-10 TABLET ORAL EVERY 6 HOURS PRN
Qty: 12 TABLET | Refills: 0 | Status: SHIPPED | OUTPATIENT
Start: 2018-02-05 | End: 2018-06-18

## 2018-02-05 RX ORDER — HYDROCODONE BITARTRATE AND ACETAMINOPHEN 5; 325 MG/1; MG/1
1 TABLET ORAL ONCE
Status: DISCONTINUED | OUTPATIENT
Start: 2018-02-05 | End: 2018-02-05 | Stop reason: HOSPADM

## 2018-02-05 RX ORDER — ONDANSETRON 2 MG/ML
4 INJECTION INTRAMUSCULAR; INTRAVENOUS ONCE
Status: COMPLETED | OUTPATIENT
Start: 2018-02-05 | End: 2018-02-05

## 2018-02-05 RX ORDER — IOPAMIDOL 755 MG/ML
500 INJECTION, SOLUTION INTRAVASCULAR ONCE
Status: COMPLETED | OUTPATIENT
Start: 2018-02-05 | End: 2018-02-05

## 2018-02-05 RX ORDER — ONDANSETRON 4 MG/1
4 TABLET, ORALLY DISINTEGRATING ORAL EVERY 8 HOURS PRN
Qty: 5 TABLET | Refills: 0 | Status: SHIPPED | OUTPATIENT
Start: 2018-02-05 | End: 2020-03-11

## 2018-02-05 RX ORDER — KETOROLAC TROMETHAMINE 15 MG/ML
15 INJECTION, SOLUTION INTRAMUSCULAR; INTRAVENOUS ONCE
Status: COMPLETED | OUTPATIENT
Start: 2018-02-05 | End: 2018-02-05

## 2018-02-05 RX ORDER — OXYCODONE AND ACETAMINOPHEN 5; 325 MG/1; MG/1
2 TABLET ORAL ONCE
Status: COMPLETED | OUTPATIENT
Start: 2018-02-05 | End: 2018-02-05

## 2018-02-05 RX ADMIN — SODIUM CHLORIDE, POTASSIUM CHLORIDE, SODIUM LACTATE AND CALCIUM CHLORIDE 1000 ML: 600; 310; 30; 20 INJECTION, SOLUTION INTRAVENOUS at 10:46

## 2018-02-05 RX ADMIN — KETOROLAC TROMETHAMINE 15 MG: 15 INJECTION, SOLUTION INTRAMUSCULAR; INTRAVENOUS at 10:57

## 2018-02-05 RX ADMIN — SODIUM CHLORIDE 61 ML: 9 INJECTION, SOLUTION INTRAVENOUS at 11:17

## 2018-02-05 RX ADMIN — ONDANSETRON 4 MG: 2 INJECTION INTRAMUSCULAR; INTRAVENOUS at 10:57

## 2018-02-05 RX ADMIN — IOPAMIDOL 85 ML: 755 INJECTION, SOLUTION INTRAVENOUS at 11:17

## 2018-02-05 RX ADMIN — OXYCODONE HYDROCHLORIDE AND ACETAMINOPHEN 2 TABLET: 5; 325 TABLET ORAL at 13:47

## 2018-02-05 ASSESSMENT — ENCOUNTER SYMPTOMS
FREQUENCY: 0
VOMITING: 1
CONSTIPATION: 1
ABDOMINAL DISTENTION: 1
ABDOMINAL PAIN: 1
FEVER: 0

## 2018-02-05 NOTE — ED AVS SNAPSHOT
[facility] Emergency Department    [address]    [facility] [address]    Phone:  [phone]    Fax:  [phone]                                       Marlin Lopez   MRN: [id number]    Department:  [facility] Emergency Department   Date of Visit:  2/5/2018           Patient Information     Date Of Birth          1970        Your diagnoses for this visit were:     Abdominal pain, generalized     Colitis        You were seen by Joao Rueda MD.        Discharge Instructions       Please make an appointment to follow up with your primary care provider in 2-3 days if not improving.    Discharge Instructions  Abdominal Pain    Abdominal pain (belly pain) can be caused by many things. Your evaluation today does not show the exact cause for your pain. Your provider today has decided that it is unlikely your pain is due to a life threatening problem, or a problem requiring surgery or hospital admission. Sometimes those problems cannot be found right away, so it is very important that you follow up as directed.  Sometimes only the changes which occur over time allow the cause of your pain to be found.    Generally, every Emergency Department visit should have a follow-up clinic visit with either a primary or a specialty clinic/provider. Please follow-up as instructed by your emergency provider today. With abdominal pain, we often recommend very close follow-up, such as the following day.    ADULTS:  Return to the Emergency Department right away if:      You get an oral temperature above 102oF or as directed by your provider.    You have blood in your stools. This may be bright red or appear as black, tarry stools.      You keep vomiting (throwing up) or cannot drink liquids.    You see blood when you vomit.     You cannot have a bowel movement or you cannot pass gas.    Your stomach gets bloated or bigger.    Your skin or the whites of your eyes look  yellow.    You faint.    You have bloody, frequent or painful urination (peeing).    You have new symptoms or anything that worries you.    CHILDREN:  Return to the Emergency Department right away if your child has any of the above-listed symptoms or the following:      Pushes your hand away or screams/cries when his/her belly is touched.    You notice your child is very fussy or weak.    Your child is very tired and is too tired to eat or drink.    Your child is dehydrated.  Signs of dehydration can be:  o Significant change in the amount of wet diapers/urine.  o Your infant or child starts to have dry mouth and lips, or no saliva (spit) or tears.    PREGNANT WOMEN:  Return to the Emergency Department right away if you have any of the above-listed symptoms or the following:      You have bleeding, leaking fluid or passing tissue from the vagina.    You have worse pain or cramping, or pain in your shoulder or back.    You have vomiting that will not stop.    You have a temperature of 100oF or more.    Your baby is not moving as much as usual.    You faint.    You get a bad headache with or without eye problems and abdominal pain.    You have a seizure.    You have unusual discharge from your vagina and abdominal pain.    Abdominal pain is pretty common during pregnancy.  Your pain may or may not be related to your pregnancy. You should follow-up closely with your OB provider so they can evaluate you and your baby.  Until you follow-up with your regular provider, do the following:       Avoid sex and do not put anything in your vagina.    Drink clear fluids.    Only take medications approved by your provider.    MORE INFORMATION:    Appendicitis:  A possible cause of abdominal pain in any person who still has their appendix is acute appendicitis. Appendicitis is often hard to diagnose.  Testing does not always rule out early appendicitis or other causes of abdominal pain. Close follow-up with your provider and  "re-evaluations may be needed to figure out the reason for your abdominal pain.    Follow-up:  It is very important that you make an appointment with your clinic and go to the appointment.  If you do not follow-up with your primary provider, it may result in missing an important development which could result in permanent injury or disability and/or lasting pain.  If there is any problem keeping your appointment, call your provider or return to the Emergency Department.    Medications:  Take your medications as directed by your provider today.  Before using over-the-counter medications, ask your provider and make sure to take the medications as directed.  If you have any questions about medications, ask your provider.    Diet:  Resume your normal diet as much as possible, but do not eat fried, fatty or spicy foods while you have pain.  Do not drink alcohol or have caffeine.  Do not smoke tobacco.    Probiotics: If you have been given an antibiotic, you may want to also take a probiotic pill or eat yogurt with live cultures. Probiotics have \"good bacteria\" to help your intestines stay healthy. Studies have shown that probiotics help prevent diarrhea (loose stools) and other intestine problems (including C. diff infection) when you take antibiotics. You can buy these without a prescription in the pharmacy section of the store.     If you were given a prescription for medicine here today, be sure to read all of the information (including the package insert) that comes with your prescription.  This will include important information about the medicine, its side effects, and any warnings that you need to know about.  The pharmacist who fills the prescription can provide more information and answer questions you may have about the medicine.  If you have questions or concerns that the pharmacist cannot address, please call or return to the Emergency Department.       Remember that you can always come back to the Emergency " Department if you are not able to see your regular provider in the amount of time listed above, if you get any new symptoms, or if there is anything that worries you.        24 Hour Appointment Hotline       To make an appointment at any [facility] clinic, call [phone] ([phone]). If you don't have a family doctor or clinic, we will help you find one. [facility] clinics are conveniently located to serve the needs of you and your family.             Review of your medicines      START taking        Dose / Directions Last dose taken    ondansetron 4 MG ODT tab   Commonly known as:  ZOFRAN-ODT   Dose:  4 mg   Quantity:  5 tablet        Take 1 tablet (4 mg) by mouth every 8 hours as needed for nausea   Refills:  0        oxyCODONE IR 5 MG tablet   Commonly known as:  ROXICODONE   Dose:  5-10 mg   Quantity:  12 tablet        Take 1-2 tablets (5-10 mg) by mouth every 6 hours as needed for moderate to severe pain   Refills:  0          Our records show that you are taking the medicines listed below. If these are incorrect, please call your family doctor or clinic.        Dose / Directions Last dose taken    amphetamine-dextroamphetamine 20 MG per tablet   Commonly known as:  ADDERALL   Dose:  20 mg   Quantity:  60 tablet   Start taking on:  2/20/2018        Take 1 tablet (20 mg) by mouth 2 times daily   Refills:  0        aspirin 325 MG EC tablet        Take  by mouth daily.   Refills:  0        diclofenac 75 MG EC tablet   Commonly known as:  VOLTAREN   Dose:  75 mg   Quantity:  20 tablet        Take 1 tablet (75 mg) by mouth 2 times daily as needed for moderate pain   Refills:  0        fexofenadine 180 MG tablet   Commonly known as:  ALLEGRA   Dose:  180 mg   Quantity:  30 tablet        Take 1 tablet (180 mg) by mouth daily   Refills:  1        ibuprofen 800 MG tablet   Commonly known as:  ADVIL/MOTRIN   Quantity:  30 tablet        TAKE 1 TABLET(800 MG) BY MOUTH EVERY 8 HOURS AS NEEDED FOR MODERATE PAIN    Refills:  0        ketotifen 0.025 % Soln ophthalmic solution   Commonly known as:  ZADITOR   Dose:  1 drop   Quantity:  1 Bottle        Place 1 drop into both eyes 2 times daily   Refills:  1        nicotine 21 MG/24HR 24 hr patch   Commonly known as:  NICODERM CQ   Dose:  1 patch   Quantity:  30 patch        Place 1 patch onto the skin every 24 hours   Refills:  4        polyethylene glycol powder   Commonly known as:  MIRALAX   Dose:  1 capful   Quantity:  510 g        Take 17 g (1 capful) by mouth daily   Refills:  1        traZODone 50 MG tablet   Commonly known as:  DESYREL   Dose:  50 mg   Quantity:  30 tablet        Take 1 tablet (50 mg) by mouth At Bedtime   Refills:  2        valACYclovir 1000 mg tablet   Commonly known as:  VALTREX   Dose:  2000 mg   Quantity:  20 tablet        Take 2 tablets (2,000 mg) by mouth 2 times daily   Refills:  0                Prescriptions were sent or printed at these locations (2 Prescriptions)                   Other Prescriptions                Printed at Department/Unit printer (2 of 2)         oxyCODONE IR (ROXICODONE) 5 MG tablet               ondansetron (ZOFRAN-ODT) 4 MG ODT tab                Procedures and tests performed during your visit     Abd/pelvis CT,  IV  contrast only TRAUMA / AAA    CBC with platelets differential    Comprehensive metabolic panel    Lipase    UA with Microscopic      Orders Needing Specimen Collection     None      Pending Results     Date and Time Order Name Status Description    2/5/2018 1033 Abd/pelvis CT,  IV  contrast only TRAUMA / AAA Preliminary             Pending Culture Results     No orders found from 2/3/2018 to 2/6/2018.            Pending Results Instructions     If you had any lab results that were not finalized at the time of your Discharge, you can call the ED Lab Result RN at [phone]. You will be contacted by this team for any positive Lab results or changes in treatment. The nurses are available 7 days a week from  10A to 6:30P.  You can leave a message 24 hours per day and they will return your call.        Test Results From Your Hospital Stay        2/5/2018 11:38 AM      Component Results     Component Value Ref Range & Units Status    WBC 10.3 4.0 - 11.0 10e9/L Final    RBC Count 4.22 3.8 - 5.2 10e12/L Final    Hemoglobin 12.8 11.7 - 15.7 g/dL Final    Hematocrit 37.7 35.0 - 47.0 % Final    MCV 89 78 - 100 fl Final    MCH 30.3 26.5 - 33.0 pg Final    MCHC 34.0 31.5 - 36.5 g/dL Final    RDW 12.3 10.0 - 15.0 % Final    Platelet Count 303 150 - 450 10e9/L Final    Diff Method Automated Method  Final    % Neutrophils 84.1 % Final    % Lymphocytes 8.4 % Final    % Monocytes 4.8 % Final    % Eosinophils 1.8 % Final    % Basophils 0.6 % Final    % Immature Granulocytes 0.3 % Final    Nucleated RBCs 0 0 /100 Final    Absolute Neutrophil 8.6 (H) 1.6 - 8.3 10e9/L Final    Absolute Lymphocytes 0.9 0.8 - 5.3 10e9/L Final    Absolute Monocytes 0.5 0.0 - 1.3 10e9/L Final    Absolute Eosinophils 0.2 0.0 - 0.7 10e9/L Final    Absolute Basophils 0.1 0.0 - 0.2 10e9/L Final    Abs Immature Granulocytes 0.0 0 - 0.4 10e9/L Final    Absolute Nucleated RBC 0.0  Final         2/5/2018 11:38 AM      Component Results     Component Value Ref Range & Units Status    Sodium 144 133 - 144 mmol/L Final    Potassium 3.7 3.4 - 5.3 mmol/L Final    Chloride 112 (H) 94 - 109 mmol/L Final    Carbon Dioxide 22 20 - 32 mmol/L Final    Anion Gap 10 3 - 14 mmol/L Final    Glucose 100 (H) 70 - 99 mg/dL Final    Urea Nitrogen 14 7 - 30 mg/dL Final    Creatinine 0.64 0.52 - 1.04 mg/dL Final    GFR Estimate >90 >60 mL/min/1.7m2 Final    Non  GFR Calc    GFR Estimate If Black >90 >60 mL/min/1.7m2 Final    African American GFR Calc    Calcium 8.7 8.5 - 10.1 mg/dL Final    Bilirubin Total 0.6 0.2 - 1.3 mg/dL Final    Albumin 3.6 3.4 - 5.0 g/dL Final    Protein Total 6.5 (L) 6.8 - 8.8 g/dL Final    Alkaline Phosphatase 71 40 - 150 U/L Final    ALT 18 0 -  50 U/L Final    AST 12 0 - 45 U/L Final         2/5/2018 11:38 AM      Component Results     Component Value Ref Range & Units Status    Lipase 128 73 - 393 U/L Final         2/5/2018  1:34 PM      Component Results     Component Value Ref Range & Units Status    Color Urine Yellow  Final    Appearance Urine Clear  Final    Glucose Urine Negative NEG^Negative mg/dL Final    Bilirubin Urine Negative NEG^Negative Final    Ketones Urine Negative NEG^Negative mg/dL Final    Specific Gravity Urine 1.005 1.003 - 1.035 Final    Blood Urine Negative NEG^Negative Final    pH Urine 6.0 5.0 - 7.0 pH Final    Protein Albumin Urine Negative NEG^Negative mg/dL Final    Urobilinogen mg/dL 0.0 0.0 - 2.0 mg/dL Final    Nitrite Urine Negative NEG^Negative Final    Leukocyte Esterase Urine Negative NEG^Negative Final    Source Midstream Urine  Final    WBC Urine 1 0 - 2 /HPF Final    RBC Urine 5 (H) 0 - 2 /HPF Final    Squamous Epithelial /HPF Urine 1 0 - 1 /HPF Final    Mucous Urine Present (A) NEG^Negative /LPF Final         2/5/2018 11:49 AM      Narrative     CT ABDOMEN AND PELVIS WITH CONTRAST   2/5/2018 11:32 AM     HISTORY: Lower abdominal pain. Bloating and nausea.    TECHNIQUE: 85 mL Isovue-370 IV were administered. After contrast  administration, volumetric helical sections were acquired from the  lung bases to the ischial tuberosities. Coronal images were also  reconstructed. Radiation dose for this scan was reduced using  automated exposure control, adjustment of the mA and/or kV according  to patient size, or iterative reconstruction technique.    COMPARISON: None.     FINDINGS: No bowel obstruction. Mild diffuse bowel wall thickening in  the descending colon could be related to lack of distention, although  mild colitis could also have this appearance. Scattered sigmoid  diverticulosis, without convincing evidence for diverticulitis.  Unremarkable appendix. No free intraperitoneal air. No fluid  collections are  identified. No adnexal masses are seen. No free fluid  in the pelvis. The liver, gallbladder, spleen, adrenal glands, and  pancreas are unremarkable. Small cyst in the upper pole of the left  kidney. The kidneys are otherwise unremarkable. No hydronephrosis. The  visualized lung bases are clear.        Impression     IMPRESSION:   1. Mild bowel wall thickening in the descending colon could be related  to lack of distention, although mild colitis could also have this  appearance. Please clinically correlate.  2. Scattered colonic diverticulosis, without evidence for  diverticulitis.                Clinical Quality Measure: Blood Pressure Screening     Your blood pressure was checked while you were in the emergency department today. The last reading we obtained was  BP: 117/77 . Please read the guidelines below about what these numbers mean and what you should do about them.  If your systolic blood pressure (the top number) is less than 120 and your diastolic blood pressure (the bottom number) is less than 80, then your blood pressure is normal. There is nothing more that you need to do about it.  If your systolic blood pressure (the top number) is 120-139 or your diastolic blood pressure (the bottom number) is 80-89, your blood pressure may be higher than it should be. You should have your blood pressure rechecked within a year by a primary care provider.  If your systolic blood pressure (the top number) is 140 or greater or your diastolic blood pressure (the bottom number) is 90 or greater, you may have high blood pressure. High blood pressure is treatable, but if left untreated over time it can put you at risk for heart attack, stroke, or kidney failure. You should have your blood pressure rechecked by a primary care provider within the next 4 weeks.  If your provider in the emergency department today gave you specific instructions to follow-up with your doctor or provider even sooner than that, you should follow  "that instruction and not wait for up to 4 weeks for your follow-up visit.        Thank you for choosing [facility]       Thank you for choosing [facility] for your care. Our goal is always to provide you with excellent care. Hearing back from our patients is one way we can continue to improve our services. Please take a few minutes to complete the written survey that you may receive in the mail after you visit with us. Thank you!        [employer] Information     [employer] lets you send messages to your doctor, view your test results, renew your prescriptions, schedule appointments and more. To sign up, go to [email or web address] . Click on \"Log in\" on the left side of the screen, which will take you to the Welcome page. Then click on \"Sign up Now\" on the right side of the page.     You will be asked to enter the access code listed below, as well as some personal information. Please follow the directions to create your username and password.     Your access code is: [email or web address]  Expires: 2018 12:14 PM     Your access code will  in 90 days. If you need help or a new code, please call your [facility] clinic or [phone].        Care EveryWhere ID     This is your Care EveryWhere ID. This could be used by other organizations to access your [facility] medical records  [id number]        Equal Access to Services     NAFISA JIMENEZ : Hadii susie Salamanca, waaxda luqadaha, qaybta kaalmada ethan, charli locke . So [facility] [phone].    ATENCIÓN: Si habla español, tiene a chavez disposición servicios gratuitos de asistencia lingüística. Llame al [phone].    We comply with applicable federal civil rights laws and [place] laws. We do not discriminate on the basis of race, color, national origin, age, disability, sex, sexual orientation, or gender identity.            After Visit Summary       This is your record. Keep this with you and show to your community pharmacist(s) and " doctor(s) at your next visit.

## 2018-02-05 NOTE — ED AVS SNAPSHOT
[facility] Emergency Department    [address]    [facility] [address]    Phone:  [phone]    Fax:  [phone]                                       Marlin Lopez   MRN: [id number]    Department:  [facility] Emergency Department   Date of Visit:  2/5/2018           After Visit Summary Signature Page     I have received my discharge instructions, and my questions have been answered. I have discussed any challenges I see with this plan with the nurse or doctor.    ..........................................................................................................................................  Patient/Patient Representative Signature      ..........................................................................................................................................  Patient Representative Print Name and Relationship to Patient    ..................................................               ................................................  Date                                            Time    ..........................................................................................................................................  Reviewed by Signature/Title    ...................................................              ..............................................  Date                                                            Time

## 2018-02-05 NOTE — ED PROVIDER NOTES
"  History     Chief Complaint:  Abdominal pain    HPI:   The history is provided by the patient.      Marlin Lopez is a 48 year old female with fibromyalgia, diverticulitis, chronic constipation, ovarian cysts, and endometrial polyps who presents via EMS for evaluation of abdominal pain. En route, the patient was given Fentanyl.  Of note, the patient had one of her ovaries removed secondary to a \"grape fruit size cyst\", but she is uncertain if this pain is the same. The patient reports that for the last 2 weeks she has had diffuse abdominal pain with bloating. She states that she feels that she has not been adequately moving her bowels with some episodes of \"liquidy stool\". She says that she had an episode of vomiting today and presents concerned for her ongoing symptoms. She rates her pain an 8/10 in severity. She denies any increase in frequency, fever, or history of bowel obstruction and has no other complaints.     Allergies:  Cats  Codeine     Medications:    traZODone (DESYREL) 50 MG tablet  ketotifen (ZADITOR) 0.025 % SOLN ophthalmic solution  ibuprofen (ADVIL/MOTRIN) 800 MG tablet  [START ON 2/20/2018] amphetamine-dextroamphetamine (ADDERALL) 20 MG per tablet  polyethylene glycol (MIRALAX) powder  aspirin 325 MG EC tablet     Past Medical History:    ADHD (attention deficit hyperactivity disorder)   Anxiety state, unspecified   Depression   Endometrial polyps   Ovarian cysts   PID (pelvic inflammatory disease)   Recurrent cold sores   Chronic constipation   Fibromyalgia   Diverticulitis     Past Surgical History:    Left ovarian cyst removal   Tonsillectomy      Family History:    Cerebrovascular disease     Social History:  Presents via EMS   Tobacco use: Former smoker quit 1980  Alcohol use: 3 days /week 2 each time   PCP: Zach Corona    Marital Status:  Legally      Review of Systems   Constitutional: Negative for fever.   Gastrointestinal: Positive for abdominal distention, " "abdominal pain, constipation and vomiting.   Genitourinary: Negative for frequency.     Physical Exam     Patient Vitals for the past 24 hrs:   BP Temp Temp src Pulse Heart Rate Resp SpO2 Height Weight   02/05/18 1145 117/77 - - 62 62 - 97 % - -   02/05/18 1045 - - - - - - 97 % - -   02/05/18 1031 107/64 97.9  F (36.6  C) Oral 60 - 20 97 % 1.727 m (5' 8\") 77.1 kg (170 lb)   02/05/18 1030 120/63 - - - - - 96 % - -        Physical Exam   HENT:   Right Ear: External ear normal.   Left Ear: External ear normal.   Nose: Nose normal.   Eyes: Conjunctivae and lids are normal.   Neck: Neck supple. No tracheal deviation present.   Cardiovascular: Regular rhythm and intact distal pulses.    Pulmonary/Chest: Breath sounds normal. No respiratory distress.   Abdominal: Soft. She exhibits no distension. There is tenderness (+ ttp bilateral lower qudrants, no rigidity, no masses). There is no rebound and no guarding.   No cva ttp   Musculoskeletal:   No peripheral edema   Neurological:   MAEE, no gross focal motor or sensory deficit   Skin: Skin is warm and dry. She is not diaphoretic.   Psychiatric: She has a normal mood and affect.   Nursing note and vitals reviewed.        Emergency Department Course     Imaging:  Radiographic findings were communicated with the patient who voiced understanding of the findings.     CT Abd/pelvis with IV contrast, Trauma/AAA:  IMPRESSION:   1. Mild bowel wall thickening in the descending colon could be related to lack of distention, although mild colitis could also have this appearance. Please clinically correlate.  2. Scattered colonic diverticulosis, without evidence for diverticulitis.    Imaging independently reviewed and agree with radiologist interpretation.      Laboratory:  CBC: WNL (WBC 10.3, HGB 12.8, )    CMP: Chloride 112 (high), Glucose 100 (high), Protein albumin 6.5 (low), ow WNL (Creatinine 0.64)   Lipase: 128   UA with Microscopic: RBC 5 (high), Mucous present (A), ow " Negative     Interventions:  1057: Zofran 4 mg IV   1057: Toradol 15 mg IV   1046: Lactated ringers 1000 mL IV Bolus    1117: NS 61 ml IV Bolus    1117: ISOVUE-370 500 ml IV     Emergency Department Course:  Past medical records, nursing notes, and vitals reviewed.  1027: I performed an exam of the patient and obtained history, as documented above.  IV inserted and blood drawn.   Above interventions provided.   The patient was sent for a CT while in the emergency department, findings above.   I personally reviewed the laboratory results with the Patient and answered all related questions prior to discharge.    1338: I rechecked the patient. Findings and plan explained to the Patient. Patient discharged home with instructions regarding supportive care, medications, and reasons to return. The importance of close follow-up was reviewed.      Impression & Plan      Medical Decision Making:  Marlin Lopez is a 48 year old female here with subacute to chronic abdominal pain. Concerning for obstruction, diverticulitis, less likely GYN pathology. UTI, kidney stone, vascular catastrophe. Work up here showed no significant cause of pain other than possibly mild colitis, although may have been an artifact on CT. I do not think she needs hospitalization as there is no evidence of emergent surgical infectious or vascular process. Can be discharged home. Follow up clinically and follow up with GI as needed.     Diagnosis:    ICD-10-CM    1. Abdominal pain, generalized R10.84    2. Colitis K52.9        Disposition:  Discharged to home with plan as outlined.      Discharge Medications:  New Prescriptions    ONDANSETRON (ZOFRAN-ODT) 4 MG ODT TAB    Take 1 tablet (4 mg) by mouth every 8 hours as needed for nausea    OXYCODONE IR (ROXICODONE) 5 MG TABLET    Take 1-2 tablets (5-10 mg) by mouth every 6 hours as needed for moderate to severe pain     Scribe Disclosure:   Gilberto ABDULLAHI, am serving as a scribe at 10:27 AM on  2/5/2018 to document services personally performed by Joao Rueda MD based on my observations and the provider's statements to me.       Gilberto Alvarez  2/5/2018   [facility] EMERGENCY DEPARTMENT       Joao Rueda MD  02/05/18 2027

## 2018-02-05 NOTE — DISCHARGE INSTRUCTIONS
Please make an appointment to follow up with your primary care provider in 2-3 days if not improving.    Discharge Instructions  Abdominal Pain    Abdominal pain (belly pain) can be caused by many things. Your evaluation today does not show the exact cause for your pain. Your provider today has decided that it is unlikely your pain is due to a life threatening problem, or a problem requiring surgery or hospital admission. Sometimes those problems cannot be found right away, so it is very important that you follow up as directed.  Sometimes only the changes which occur over time allow the cause of your pain to be found.    Generally, every Emergency Department visit should have a follow-up clinic visit with either a primary or a specialty clinic/provider. Please follow-up as instructed by your emergency provider today. With abdominal pain, we often recommend very close follow-up, such as the following day.    ADULTS:  Return to the Emergency Department right away if:      You get an oral temperature above 102oF or as directed by your provider.    You have blood in your stools. This may be bright red or appear as black, tarry stools.      You keep vomiting (throwing up) or cannot drink liquids.    You see blood when you vomit.     You cannot have a bowel movement or you cannot pass gas.    Your stomach gets bloated or bigger.    Your skin or the whites of your eyes look yellow.    You faint.    You have bloody, frequent or painful urination (peeing).    You have new symptoms or anything that worries you.    CHILDREN:  Return to the Emergency Department right away if your child has any of the above-listed symptoms or the following:      Pushes your hand away or screams/cries when his/her belly is touched.    You notice your child is very fussy or weak.    Your child is very tired and is too tired to eat or drink.    Your child is dehydrated.  Signs of dehydration can be:  o Significant change in the amount of wet  diapers/urine.  o Your infant or child starts to have dry mouth and lips, or no saliva (spit) or tears.    PREGNANT WOMEN:  Return to the Emergency Department right away if you have any of the above-listed symptoms or the following:      You have bleeding, leaking fluid or passing tissue from the vagina.    You have worse pain or cramping, or pain in your shoulder or back.    You have vomiting that will not stop.    You have a temperature of 100oF or more.    Your baby is not moving as much as usual.    You faint.    You get a bad headache with or without eye problems and abdominal pain.    You have a seizure.    You have unusual discharge from your vagina and abdominal pain.    Abdominal pain is pretty common during pregnancy.  Your pain may or may not be related to your pregnancy. You should follow-up closely with your OB provider so they can evaluate you and your baby.  Until you follow-up with your regular provider, do the following:       Avoid sex and do not put anything in your vagina.    Drink clear fluids.    Only take medications approved by your provider.    MORE INFORMATION:    Appendicitis:  A possible cause of abdominal pain in any person who still has their appendix is acute appendicitis. Appendicitis is often hard to diagnose.  Testing does not always rule out early appendicitis or other causes of abdominal pain. Close follow-up with your provider and re-evaluations may be needed to figure out the reason for your abdominal pain.    Follow-up:  It is very important that you make an appointment with your clinic and go to the appointment.  If you do not follow-up with your primary provider, it may result in missing an important development which could result in permanent injury or disability and/or lasting pain.  If there is any problem keeping your appointment, call your provider or return to the Emergency Department.    Medications:  Take your medications as directed by your provider today.  Before  "using over-the-counter medications, ask your provider and make sure to take the medications as directed.  If you have any questions about medications, ask your provider.    Diet:  Resume your normal diet as much as possible, but do not eat fried, fatty or spicy foods while you have pain.  Do not drink alcohol or have caffeine.  Do not smoke tobacco.    Probiotics: If you have been given an antibiotic, you may want to also take a probiotic pill or eat yogurt with live cultures. Probiotics have \"good bacteria\" to help your intestines stay healthy. Studies have shown that probiotics help prevent diarrhea (loose stools) and other intestine problems (including C. diff infection) when you take antibiotics. You can buy these without a prescription in the pharmacy section of the store.     If you were given a prescription for medicine here today, be sure to read all of the information (including the package insert) that comes with your prescription.  This will include important information about the medicine, its side effects, and any warnings that you need to know about.  The pharmacist who fills the prescription can provide more information and answer questions you may have about the medicine.  If you have questions or concerns that the pharmacist cannot address, please call or return to the Emergency Department.       Remember that you can always come back to the Emergency Department if you are not able to see your regular provider in the amount of time listed above, if you get any new symptoms, or if there is anything that worries you.      "

## 2018-02-06 ENCOUNTER — OFFICE VISIT (OUTPATIENT)
Dept: FAMILY MEDICINE | Facility: CLINIC | Age: 48
End: 2018-02-06
Payer: COMMERCIAL

## 2018-02-06 VITALS
WEIGHT: 168 LBS | OXYGEN SATURATION: 100 % | BODY MASS INDEX: 25.46 KG/M2 | HEIGHT: 68 IN | HEART RATE: 65 BPM | DIASTOLIC BLOOD PRESSURE: 80 MMHG | RESPIRATION RATE: 15 BRPM | SYSTOLIC BLOOD PRESSURE: 120 MMHG | TEMPERATURE: 97.9 F

## 2018-02-06 DIAGNOSIS — R14.3 FLATULENCE, ERUCTATION, AND GAS PAIN: ICD-10-CM

## 2018-02-06 DIAGNOSIS — R14.1 FLATULENCE, ERUCTATION, AND GAS PAIN: ICD-10-CM

## 2018-02-06 DIAGNOSIS — N76.0 BV (BACTERIAL VAGINOSIS): ICD-10-CM

## 2018-02-06 DIAGNOSIS — B96.89 BV (BACTERIAL VAGINOSIS): ICD-10-CM

## 2018-02-06 DIAGNOSIS — F17.203 NICOTINE WITHDRAWAL: ICD-10-CM

## 2018-02-06 DIAGNOSIS — F32.1 MODERATE MAJOR DEPRESSION (H): ICD-10-CM

## 2018-02-06 DIAGNOSIS — M79.7 FIBROMYALGIA: ICD-10-CM

## 2018-02-06 DIAGNOSIS — Z11.3 SCREEN FOR STD (SEXUALLY TRANSMITTED DISEASE): ICD-10-CM

## 2018-02-06 DIAGNOSIS — K52.9 COLITIS: Primary | ICD-10-CM

## 2018-02-06 DIAGNOSIS — H57.9 ITCHY EYES: ICD-10-CM

## 2018-02-06 DIAGNOSIS — B00.1 HERPES LABIALIS: ICD-10-CM

## 2018-02-06 DIAGNOSIS — R14.2 FLATULENCE, ERUCTATION, AND GAS PAIN: ICD-10-CM

## 2018-02-06 LAB
BASOPHILS # BLD AUTO: 0 10E9/L (ref 0–0.2)
BASOPHILS NFR BLD AUTO: 0.4 %
DIFFERENTIAL METHOD BLD: NORMAL
EOSINOPHIL # BLD AUTO: 0.3 10E9/L (ref 0–0.7)
EOSINOPHIL NFR BLD AUTO: 3.7 %
ERYTHROCYTE [DISTWIDTH] IN BLOOD BY AUTOMATED COUNT: 12.6 % (ref 10–15)
HCT VFR BLD AUTO: 35.6 % (ref 35–47)
HGB BLD-MCNC: 12.1 G/DL (ref 11.7–15.7)
LYMPHOCYTES # BLD AUTO: 1.6 10E9/L (ref 0.8–5.3)
LYMPHOCYTES NFR BLD AUTO: 21 %
MCH RBC QN AUTO: 30.9 PG (ref 26.5–33)
MCHC RBC AUTO-ENTMCNC: 34 G/DL (ref 31.5–36.5)
MCV RBC AUTO: 91 FL (ref 78–100)
MONOCYTES # BLD AUTO: 0.4 10E9/L (ref 0–1.3)
MONOCYTES NFR BLD AUTO: 5.4 %
NEUTROPHILS # BLD AUTO: 5.3 10E9/L (ref 1.6–8.3)
NEUTROPHILS NFR BLD AUTO: 69.5 %
PLATELET # BLD AUTO: 288 10E9/L (ref 150–450)
RBC # BLD AUTO: 3.92 10E12/L (ref 3.8–5.2)
SPECIMEN SOURCE: ABNORMAL
WBC # BLD AUTO: 7.7 10E9/L (ref 4–11)
WET PREP SPEC: ABNORMAL

## 2018-02-06 PROCEDURE — G0499 HEPB SCREEN HIGH RISK INDIV: HCPCS | Performed by: FAMILY MEDICINE

## 2018-02-06 PROCEDURE — 87210 SMEAR WET MOUNT SALINE/INK: CPT | Performed by: FAMILY MEDICINE

## 2018-02-06 PROCEDURE — 87491 CHLMYD TRACH DNA AMP PROBE: CPT | Performed by: FAMILY MEDICINE

## 2018-02-06 PROCEDURE — 36415 COLL VENOUS BLD VENIPUNCTURE: CPT | Performed by: FAMILY MEDICINE

## 2018-02-06 PROCEDURE — 99214 OFFICE O/P EST MOD 30 MIN: CPT | Performed by: FAMILY MEDICINE

## 2018-02-06 PROCEDURE — 86803 HEPATITIS C AB TEST: CPT | Performed by: FAMILY MEDICINE

## 2018-02-06 PROCEDURE — 85025 COMPLETE CBC W/AUTO DIFF WBC: CPT | Performed by: FAMILY MEDICINE

## 2018-02-06 PROCEDURE — 87389 HIV-1 AG W/HIV-1&-2 AB AG IA: CPT | Performed by: FAMILY MEDICINE

## 2018-02-06 PROCEDURE — 86780 TREPONEMA PALLIDUM: CPT | Performed by: FAMILY MEDICINE

## 2018-02-06 PROCEDURE — 87591 N.GONORRHOEAE DNA AMP PROB: CPT | Performed by: FAMILY MEDICINE

## 2018-02-06 RX ORDER — IBUPROFEN 800 MG/1
TABLET, FILM COATED ORAL
Qty: 30 TABLET | Refills: 0 | Status: SHIPPED | OUTPATIENT
Start: 2018-02-06 | End: 2018-03-19

## 2018-02-06 RX ORDER — POLYETHYLENE GLYCOL 3350 17 G/17G
1 POWDER, FOR SOLUTION ORAL DAILY
Qty: 510 G | Refills: 1 | Status: SHIPPED | OUTPATIENT
Start: 2018-02-06 | End: 2018-05-11

## 2018-02-06 RX ORDER — METRONIDAZOLE 500 MG/1
500 TABLET ORAL 2 TIMES DAILY
Qty: 14 TABLET | Refills: 0 | Status: SHIPPED | OUTPATIENT
Start: 2018-02-06 | End: 2018-02-13

## 2018-02-06 RX ORDER — NICOTINE 21 MG/24HR
1 PATCH, TRANSDERMAL 24 HOURS TRANSDERMAL EVERY 24 HOURS
Qty: 30 PATCH | Refills: 4 | Status: SHIPPED | OUTPATIENT
Start: 2018-02-06 | End: 2019-09-15

## 2018-02-06 RX ORDER — FEXOFENADINE HCL 180 MG/1
180 TABLET ORAL DAILY
Qty: 30 TABLET | Refills: 1 | Status: SHIPPED | OUTPATIENT
Start: 2018-02-06 | End: 2018-05-11

## 2018-02-06 RX ORDER — VALACYCLOVIR HYDROCHLORIDE 1 G/1
2000 TABLET, FILM COATED ORAL 2 TIMES DAILY
Qty: 20 TABLET | Refills: 0 | Status: SHIPPED | OUTPATIENT
Start: 2018-02-06 | End: 2018-04-06

## 2018-02-06 NOTE — PATIENT INSTRUCTIONS
[facility]    If you have any questions regarding to your visit please contact your care team:       Team Red:   Clinic Hours Telephone Number   Dr. Estephania Dacosta  (pediatrics)  Magui Murray NP 7am-7pm  Monday - Thursday   7am-5pm  Fridays  [phone] (fax)    Chelle CRUMP  [phone]   Urgent Care - [place] and [place] Monday-Friday  [place] - 11am-8pm  Saturday-Sunday  Both sites - 9am-5pm  [phone] - [facility]  [phone] - [place]       What options do I have for visits at the clinic other than the traditional office visit?  To expand how we care for you, many of our providers are utilizing electronic visits (e-visits) and telephone visits, when medically appropriate, for interactions with their patients rather than a visit in the clinic.   We also offer nurse visits for many medical concerns. Just like any other service, we will bill your insurance company for this type of visit based on time spent on the phone with your provider. Not all insurance companies cover these visits. Please check with your medical insurance if this type of visit is covered. You will be responsible for any charges that are not paid by your insurance.      E-visits via [employer]:  generally incur a $35.00 fee.  Telephone visits:  Time spent on the phone: *charged based on time that is spent on the phone in increments of 10 minutes. Estimated cost:   5-10 mins $30.00   11-20 mins. $59.00   21-30 mins. $85.00     As always, Thank you for trusting us with your health care needs!            Discharge LESVIA Roberts CMA

## 2018-02-06 NOTE — PROGRESS NOTES
SUBJECTIVE:   Marlin Lopez is a 48 year old female who presents to clinic today for the following health issues:          ED/UC Followup:    Facility:    Date of visit: yesterday  Reason for visit: abdominal pain  Current Status: better  Pt says she gets abdominal pain off and on  She had Diarrhea yesterday which has resolved  Pt usually has constipation  And wondering what she can take for this       Vaginal Symptoms  Onset: several days- wants STD check    Description:  Vaginal Discharge: white   Itching (Pruritis): no   Burning sensation:  no   Odor: YES    Accompanying Signs & Symptoms:  Pain with Urination: no   Abdominal Pain: YES- better  Fever: no     History:   Sexually active: YES  New Partner: YES  Possibility of Pregnancy:  No    Precipitating factors:   Recent Antibiotic Use: no     Alleviating factors:      Therapies Tried and outcome:     Problem list and histories reviewed & adjusted, as indicated.  Additional history: as documented    Patient Active Problem List   Diagnosis     Anxiety state     Fibromyalgia     Sleep problems     Recurrent cold sores     Moderate major depression (H)     ADHD (attention deficit hyperactivity disorder)     CARDIOVASCULAR SCREENING; LDL GOAL LESS THAN 130     Family history of colon cancer     Allergic rhinitis     Renal cyst, left     Chronic constipation     Diverticulosis of large intestine without hemorrhage     Past Surgical History:   Procedure Laterality Date     C REMOVAL OF OVARY(S)  1992    Left, cyst     TONSILLECTOMY  1979       Social History   Substance Use Topics     Smoking status: Former Smoker     Packs/day: 0.50     Types: Cigarettes     Start date: 1/1/1980     Quit date: 9/20/2015     Smokeless tobacco: Never Used      Comment:  using e-cig daily     Alcohol use 0.0 oz/week     0 Standard drinks or equivalent per week      Comment:  drinks 3 days in a week, 2 beers each time      Family History   Problem Relation Age of Onset      Hypertension Maternal Grandmother      CEREBROVASCULAR DISEASE Maternal Grandmother      Alzheimer Disease Maternal Grandmother      Arthritis Maternal Grandmother      Obesity Maternal Grandmother      CANCER Other      Colon     Breast Cancer Other      Cancer - colorectal Paternal Grandfather          Current Outpatient Prescriptions   Medication Sig Dispense Refill     ibuprofen (ADVIL/MOTRIN) 800 MG tablet TAKE 1 TABLET(800 MG) BY MOUTH EVERY 8 HOURS AS NEEDED FOR MODERATE PAIN 30 tablet 0     valACYclovir (VALTREX) 1000 mg tablet Take 2 tablets (2,000 mg) by mouth 2 times daily 20 tablet 0     ketotifen (ZADITOR) 0.025 % SOLN ophthalmic solution Place 1 drop into both eyes 2 times daily 1 Bottle 1     polyethylene glycol (MIRALAX) powder Take 17 g (1 capful) by mouth daily 510 g 1     fexofenadine (ALLEGRA) 180 MG tablet Take 1 tablet (180 mg) by mouth daily 30 tablet 1     nicotine (NICODERM CQ) 21 MG/24HR 24 hr patch Place 1 patch onto the skin every 24 hours 30 patch 4     oxyCODONE IR (ROXICODONE) 5 MG tablet Take 1-2 tablets (5-10 mg) by mouth every 6 hours as needed for moderate to severe pain 12 tablet 0     traZODone (DESYREL) 50 MG tablet Take 1 tablet (50 mg) by mouth At Bedtime 30 tablet 2     [START ON 2/20/2018] amphetamine-dextroamphetamine (ADDERALL) 20 MG per tablet Take 1 tablet (20 mg) by mouth 2 times daily 60 tablet 0     aspirin 325 MG EC tablet Take  by mouth daily.       ondansetron (ZOFRAN-ODT) 4 MG ODT tab Take 1 tablet (4 mg) by mouth every 8 hours as needed for nausea 5 tablet 0     [DISCONTINUED] ketotifen (ZADITOR) 0.025 % SOLN ophthalmic solution Place 1 drop into both eyes 2 times daily 1 Bottle 1     [DISCONTINUED] valACYclovir (VALTREX) 1000 mg tablet Take 2 tablets (2,000 mg) by mouth 2 times daily 20 tablet 0     diclofenac (VOLTAREN) 75 MG EC tablet Take 1 tablet (75 mg) by mouth 2 times daily as needed for moderate pain 20 tablet 0     Allergies   Allergen Reactions      "Cats Itching     Eyes, Sneezy,     Codeine Nausea and Vomiting     Recent Labs   Lab Test  02/05/18   1040  12/22/16   1258  06/29/16   12/29/15   1453  08/04/15   1324   LDL   --    --    --    --    --   103*   --    HDL   --    --    --    --    --   78   --    TRIG   --    --    --    --    --   29   --    ALT  18  17   --   30   < >   --    --    CR  0.64  0.76   < >  0.64   --    --    --    GFRESTIMATED  >90  81   < >  >60   --    --    --    GFRESTBLACK  >90  >90  African American GFR Calc     --   >60   < >   --    --    POTASSIUM  3.7  3.8   < >  5.0   --    --    --    TSH   --    --    --   1.28   --    --   0.85    < > = values in this interval not displayed.      BP Readings from Last 3 Encounters:   02/06/18 120/80   02/05/18 117/77   12/20/17 124/76    Wt Readings from Last 3 Encounters:   02/06/18 168 lb (76.2 kg)   02/05/18 170 lb (77.1 kg)   12/20/17 171 lb 8 oz (77.8 kg)                  Labs reviewed in EPIC    Reviewed and updated as needed this visit by clinical staff  Tobacco  Allergies  Meds       Reviewed and updated as needed this visit by Provider         ROS:  C: NEGATIVE for fever, chills, change in weight  E/M: NEGATIVE for ear, mouth and throat problems  R: NEGATIVE for significant cough or SOB  CV: NEGATIVE for chest pain, palpitations or peripheral edema  GI: as above  No nausea or vomiting  : as above  postmenopause  PSYCHIATRIC: NEGATIVE for changes in mood or affect    OBJECTIVE:     /80  Pulse 65  Temp 97.9  F (36.6  C)  Resp 15  Ht 5' 8\" (1.727 m)  Wt 168 lb (76.2 kg)  LMP 01/03/2014  SpO2 100%  BMI 25.54 kg/m2  Body mass index is 25.54 kg/(m^2).  GENERAL: healthy, alert and no distress  RESP: lungs clear to auscultation - no rales, rhonchi or wheezes  CV: regular rate and rhythm, normal S1 S2, no S3 or S4, no murmur, click or rub, no peripheral edema and peripheral pulses strong  ABDOMEN: soft, nontender, no hepatosplenomegaly, no masses and bowel sounds " normal   (female): normal female external genitalia, normal urethral meatus, vaginal mucosa, normal cervix/adnexa/uterus without masses or discharge  MS: no gross musculoskeletal defects noted, no edema    Diagnostic Test Results:  Results for orders placed or performed in visit on 02/06/18 (from the past 24 hour(s))   Wet prep   Result Value Ref Range    Specimen Description Vagina     Wet Prep No Trichomonas seen     Wet Prep Clue cells seen (A)     Wet Prep No yeast seen    CBC with platelets differential   Result Value Ref Range    WBC 7.7 4.0 - 11.0 10e9/L    RBC Count 3.92 3.8 - 5.2 10e12/L    Hemoglobin 12.1 11.7 - 15.7 g/dL    Hematocrit 35.6 35.0 - 47.0 %    MCV 91 78 - 100 fl    MCH 30.9 26.5 - 33.0 pg    MCHC 34.0 31.5 - 36.5 g/dL    RDW 12.6 10.0 - 15.0 %    Platelet Count 288 150 - 450 10e9/L    Diff Method Automated Method     % Neutrophils 69.5 %    % Lymphocytes 21.0 %    % Monocytes 5.4 %    % Eosinophils 3.7 %    % Basophils 0.4 %    Absolute Neutrophil 5.3 1.6 - 8.3 10e9/L    Absolute Lymphocytes 1.6 0.8 - 5.3 10e9/L    Absolute Monocytes 0.4 0.0 - 1.3 10e9/L    Absolute Eosinophils 0.3 0.0 - 0.7 10e9/L    Absolute Basophils 0.0 0.0 - 0.2 10e9/L     Results for orders placed or performed in visit on 02/06/18   CBC with platelets differential   Result Value Ref Range    WBC 7.7 4.0 - 11.0 10e9/L    RBC Count 3.92 3.8 - 5.2 10e12/L    Hemoglobin 12.1 11.7 - 15.7 g/dL    Hematocrit 35.6 35.0 - 47.0 %    MCV 91 78 - 100 fl    MCH 30.9 26.5 - 33.0 pg    MCHC 34.0 31.5 - 36.5 g/dL    RDW 12.6 10.0 - 15.0 %    Platelet Count 288 150 - 450 10e9/L    Diff Method Automated Method     % Neutrophils 69.5 %    % Lymphocytes 21.0 %    % Monocytes 5.4 %    % Eosinophils 3.7 %    % Basophils 0.4 %    Absolute Neutrophil 5.3 1.6 - 8.3 10e9/L    Absolute Lymphocytes 1.6 0.8 - 5.3 10e9/L    Absolute Monocytes 0.4 0.0 - 1.3 10e9/L    Absolute Eosinophils 0.3 0.0 - 0.7 10e9/L    Absolute Basophils 0.0 0.0 - 0.2  10e9/L   Wet prep   Result Value Ref Range    Specimen Description Vagina     Wet Prep No Trichomonas seen     Wet Prep Clue cells seen (A)     Wet Prep No yeast seen        ASSESSMENT/PLAN:     1. Colitis  CT scan shows colitis Probable Infectious-Pt better  She has ongoing GI problems  - GASTROENTEROLOGY ADULT REF CONSULT ONLY  - CBC with platelets differential    2. BV (bacterial vaginosis)  Flagyl Given  SEE EPIC care orders  The potential side effects of this medication have been discussed with the patient.  Call if any significant problems with these are experienced.      3. Screen for STD (sexually transmitted disease)  Info on safe sex  - Anti Treponema  - NEISSERIA GONORRHOEA PCR  - CHLAMYDIA TRACHOMATIS PCR  - Hepatitis B surface antigen  - Hepatitis C antibody  - HIV Antigen Antibody Combo  - Wet prep    4. Fibromyalgia  refilled  - ibuprofen (ADVIL/MOTRIN) 800 MG tablet; TAKE 1 TABLET(800 MG) BY MOUTH EVERY 8 HOURS AS NEEDED FOR MODERATE PAIN  Dispense: 30 tablet; Refill: 0    5. Herpes labialis    - valACYclovir (VALTREX) 1000 mg tablet; Take 2 tablets (2,000 mg) by mouth 2 times daily  Dispense: 20 tablet; Refill: 0    6. Itchy eyes  Refilled-does well  - ketotifen (ZADITOR) 0.025 % SOLN ophthalmic solution; Place 1 drop into both eyes 2 times daily  Dispense: 1 Bottle; Refill: 1  - fexofenadine (ALLEGRA) 180 MG tablet; Take 1 tablet (180 mg) by mouth daily  Dispense: 30 tablet; Refill: 1    7. Flatulence, eructation, and gas pain  Ch Constipation  Advised Miralax  - polyethylene glycol (MIRALAX) powder; Take 17 g (1 capful) by mouth daily  Dispense: 510 g; Refill: 1    8. Nicotine withdrawal  Advised stop smoking  - nicotine (NICODERM CQ) 21 MG/24HR 24 hr patch; Place 1 patch onto the skin every 24 hours  Dispense: 30 patch; Refill: 4  Follow up if not better/sooner if worse  9- Depression and ADD she see Dr Ramos-Notes in epic  Myriam Tyler MD  [facility]

## 2018-02-06 NOTE — MR AVS SNAPSHOT
After Visit Summary   2/6/2018    Marlin Lopez    MRN: [id number]           Patient Information     Date Of Birth          1970        Visit Information        Provider Department      2/6/2018 12:30 PM Myriam Tyler MD [facility]        Today's Diagnoses     Colitis    -  1    Fibromyalgia        Herpes labialis        Itchy eyes        Flatulence, eructation, and gas pain        Nicotine withdrawal        Screen for STD (sexually transmitted disease)          Care Instructions    [facility]    If you have any questions regarding to your visit please contact your care team:       Team Red:   Clinic Hours Telephone Number   Dr. Estephania Dacosta  (pediatrics)  Magui Murray NP 7am-7pm  Monday - Thursday   7am-5pm  Fridays  [phone] (fax)    Chelle CRUMP  [phone]   Urgent Care - [place] and [place] Monday-Friday  [place] - 11am-8pm  Saturday-Sunday  Both sites - 9am-5pm  [phone] - [facility]  [phone] - [place]       What options do I have for visits at the clinic other than the traditional office visit?  To expand how we care for you, many of our providers are utilizing electronic visits (e-visits) and telephone visits, when medically appropriate, for interactions with their patients rather than a visit in the clinic.   We also offer nurse visits for many medical concerns. Just like any other service, we will bill your insurance company for this type of visit based on time spent on the phone with your provider. Not all insurance companies cover these visits. Please check with your medical insurance if this type of visit is covered. You will be responsible for any charges that are not paid by your insurance.      E-visits via [employer]:  generally incur a $35.00 fee.  Telephone visits:  Time spent on the phone: *charged based on time that is spent on the phone in increments of 10 minutes.  Estimated cost:   5-10 mins $30.00   11-20 mins. $59.00   21-30 mins. $85.00     As always, Thank you for trusting us with your health care needs!            Discharge LESVIA Roberts  [facility]            Follow-ups after your visit        Additional Services     GASTROENTEROLOGY ADULT REF CONSULT ONLY       Preferred Location: [facility] [phone]      Please be aware that coverage of these services is subject to the terms and limitations of your health insurance plan.  Call member services at your health plan with any benefit or coverage questions.  Any procedures must be performed at a [facility] facility OR coordinated by your clinic's referral office.    Please bring the following with you to your appointment:    (1) Any X-Rays, CTs or MRIs which have been performed.  Contact the facility where they were done to arrange for  prior to your scheduled appointment.    (2) List of current medications   (3) This referral request   (4) Any documents/labs given to you for this referral                  Who to contact     If you have questions or need follow up information about today's clinic visit or your schedule please contact [facility] [place] directly at [phone].  Normal or non-critical lab and imaging results will be communicated to you by MyChart, letter or phone within 4 business days after the clinic has received the results. If you do not hear from us within 7 days, please contact the clinic through MyChart or phone. If you have a critical or abnormal lab result, we will notify you by phone as soon as possible.  Submit refill requests through [employer] or call your pharmacy and they will forward the refill request to us. Please allow 3 business days for your refill to be completed.          Additional Information About Your Visit        Groupaliahart Information     [employer] lets you send messages to your doctor, view your test results, renew your prescriptions, schedule  "appointments and more. To sign up, go to [email or web address] . Click on \"Log in\" on the left side of the screen, which will take you to the Welcome page. Then click on \"Sign up Now\" on the right side of the page.     You will be asked to enter the access code listed below, as well as some personal information. Please follow the directions to create your username and password.     Your access code is: [email or web address]  Expires: 2018 12:14 PM     Your access code will  in 90 days. If you need help or a new code, please call your [facility] clinic or [phone].        Care EveryWhere ID     This is your Care EveryWhere ID. This could be used by other organizations to access your [facility] medical records  [id number]        Your Vitals Were     Pulse Temperature Respirations Height Last Period Pulse Oximetry    65 97.9  F (36.6  C) 15 5' 8\" (1.727 m) 2014 100%    BMI (Body Mass Index)                   25.54 kg/m2            Blood Pressure from Last 3 Encounters:   18 120/80   18 117/77   17 124/76    Weight from Last 3 Encounters:   18 168 lb (76.2 kg)   18 170 lb (77.1 kg)   17 171 lb 8 oz (77.8 kg)              We Performed the Following     Anti Treponema     CBC with platelets differential     CHLAMYDIA TRACHOMATIS PCR     GASTROENTEROLOGY ADULT REF CONSULT ONLY     Hepatitis B surface antigen     Hepatitis C antibody     HIV Antigen Antibody Combo     NEISSERIA GONORRHOEA PCR     Wet prep          Where to get your medicines      These medications were sent to [id number]  [place]HEL[place]Y NIX - [address]   [address] [place] [address]     Phone:  [phone]     fexofenadine 180 MG tablet    ibuprofen 800 MG tablet    ketotifen 0.025 % Soln ophthalmic solution    nicotine 21 MG/24HR 24 hr patch    polyethylene glycol powder    valACYclovir 1000 mg tablet          Primary Care Provider Office Phone # Fax #    " Zach Corona -[phone]       [address]  [place] [address]        Equal Access to Services     NAFISA TONY : Meliza susie jones patricia Salamanca, wathadda luqjackie, qapuneetta kabjda ethan, charli chambersobed christina. So [facility] [phone].    ATENCIÓN: Si habla español, tiene a chavez disposición servicios gratuitos de asistencia lingüística. Llame al [phone].    We comply with applicable federal civil rights laws and [place] laws. We do not discriminate on the basis of race, color, national origin, age, disability, sex, sexual orientation, or gender identity.            Thank you!     Thank you for choosing [facility]  for your care. Our goal is always to provide you with excellent care. Hearing back from our patients is one way we can continue to improve our services. Please take a few minutes to complete the written survey that you may receive in the mail after your visit with us. Thank you!             Your Updated Medication List - Protect others around you: Learn how to safely use, store and throw away your medicines at www.disposemymeds.org.          This list is accurate as of 2/6/18 12:51 PM.  Always use your most recent med list.                   Brand Name Dispense Instructions for use Diagnosis    amphetamine-dextroamphetamine 20 MG per tablet   Start taking on:  2/20/2018    ADDERALL    60 tablet    Take 1 tablet (20 mg) by mouth 2 times daily    Attention deficit hyperactivity disorder (ADHD), unspecified ADHD type       aspirin 325 MG EC tablet      Take  by mouth daily.        diclofenac 75 MG EC tablet    VOLTAREN    20 tablet    Take 1 tablet (75 mg) by mouth 2 times daily as needed for moderate pain    Chest wall pain       fexofenadine 180 MG tablet    ALLEGRA    30 tablet    Take 1 tablet (180 mg) by mouth daily    Itchy eyes       ibuprofen 800 MG tablet    ADVIL/MOTRIN    30 tablet    TAKE 1 TABLET(800 MG) BY MOUTH EVERY 8  HOURS AS NEEDED FOR MODERATE PAIN    Fibromyalgia       ketotifen 0.025 % Soln ophthalmic solution    ZADITOR    1 Bottle    Place 1 drop into both eyes 2 times daily    Itchy eyes       nicotine 21 MG/24HR 24 hr patch    NICODERM CQ    30 patch    Place 1 patch onto the skin every 24 hours    Nicotine withdrawal       ondansetron 4 MG ODT tab    ZOFRAN-ODT    5 tablet    Take 1 tablet (4 mg) by mouth every 8 hours as needed for nausea        oxyCODONE IR 5 MG tablet    ROXICODONE    12 tablet    Take 1-2 tablets (5-10 mg) by mouth every 6 hours as needed for moderate to severe pain        polyethylene glycol powder    MIRALAX    510 g    Take 17 g (1 capful) by mouth daily    Flatulence, eructation, and gas pain       traZODone 50 MG tablet    DESYREL    30 tablet    Take 1 tablet (50 mg) by mouth At Bedtime    Insomnia, unspecified type       valACYclovir 1000 mg tablet    VALTREX    20 tablet    Take 2 tablets (2,000 mg) by mouth 2 times daily    Herpes labialis

## 2018-02-07 LAB
C TRACH DNA SPEC QL NAA+PROBE: NEGATIVE
HBV SURFACE AG SERPL QL IA: NONREACTIVE
HCV AB SERPL QL IA: NONREACTIVE
HIV 1+2 AB+HIV1 P24 AG SERPL QL IA: NONREACTIVE
N GONORRHOEA DNA SPEC QL NAA+PROBE: NEGATIVE
SPECIMEN SOURCE: NORMAL
SPECIMEN SOURCE: NORMAL
T PALLIDUM IGG+IGM SER QL: NEGATIVE

## 2018-02-08 ENCOUNTER — TELEPHONE (OUTPATIENT)
Dept: FAMILY MEDICINE | Facility: CLINIC | Age: 48
End: 2018-02-08

## 2018-02-08 DIAGNOSIS — B85.2 LICE: Primary | ICD-10-CM

## 2018-02-08 RX ORDER — PERMETHRIN 50 MG/G
CREAM TOPICAL
Qty: 60 G | Refills: 1 | Status: SHIPPED | OUTPATIENT
Start: 2018-02-08 | End: 2018-07-31

## 2018-02-08 NOTE — TELEPHONE ENCOUNTER
Reason for call:  Medication   If this is a refill request, has the caller requested the refill from the pharmacy already? No  Will the patient be using a [facility]? No  Name of the pharmacy and phone number for the current request: [id number] - [place], [place] - [address] [facility] AT [facility] [address]     Name of the medication requested: Patient has head lice please prescribe something.    Other request: head lice    Phone number to reach patient:  Home number on file [phone] (home)    Best Time:  ASAP    Can we leave a detailed message on this number?  YES

## 2018-02-08 NOTE — TELEPHONE ENCOUNTER
Head Lice  Last Written Prescription Date:  n/a  Last Fill Quantity: n/a,   # refills: n/a  Last Office Visit: 2/6/2018  Future Office visit:       Routing refill request to provider for review/approval because:  Drug not on the [facility], [facility] or  Health refill protocol or controlled substance

## 2018-02-08 NOTE — TELEPHONE ENCOUNTER
Spoke with pt. States she is currently at her GI appt so cannot talk very long. States she has been seen for this and now she knows for sure it is happening. Tried something over the counter and deep conditioning and nix over the counter. States she has seen the eggs on a comb. Not sure if it is body lice or head lice. Is itchy all over her body. No Rash. Woke up this am with bumps under where her bra is.  Advised to wash clothing and linens in hot water. Wash clothing inside out to destroy lice or eggs hiding in the seams. Dry on high heat (if possible) and iron seams. Place clothing and items that cannot be washed in a plastic bag for 3 days. Clean furniture, carpets and mattresses.     Requesting a prescription for this. She also wants to know how to wash her furniture and bedding. (would like provider's opinion)     ok to leave a detailed message.     Clementina Hartley RN  [facility]

## 2018-02-09 NOTE — TELEPHONE ENCOUNTER
Spoke with patient and advised to strip all the sheets and mattress pads off and place in the dryer on high heat for 40 min. Vacuum the mattress and all areas surrounding the mattress, repeat this step daily for 5 days. Advised prescription was sent into pharmacy and to start using.     permethrin (ELIMITE) 5 % cream 60 g 1 2/8/2018  --   Sig: Apply to clean, dry hair and leave on overnight or for 8-14 hours before washing off with water.   Class: E-Prescribe   Order: [id number]   E-Prescribing Status: Receipt confirmed by pharmacy (2/8/2018  5:38 PM CST)           No further questions.  Patient verbalized understanding.  Call with questions.     Charla Kimball RN

## 2018-02-23 ENCOUNTER — TRANSFERRED RECORDS (OUTPATIENT)
Dept: HEALTH INFORMATION MANAGEMENT | Facility: CLINIC | Age: 48
End: 2018-02-23

## 2018-02-27 ENCOUNTER — TELEPHONE (OUTPATIENT)
Dept: FAMILY MEDICINE | Facility: CLINIC | Age: 48
End: 2018-02-27

## 2018-02-27 NOTE — TELEPHONE ENCOUNTER
Reason for Call:  Other prescription    Detailed comments: Patient believes she has a yeast infection (recurrent). And is looking to get a script for to treat this.     Phone Number Patient can be reached at: Home number on file [phone] (home)    Best Time: any    Can we leave a detailed message on this number? YES    Call taken on 2/27/2018 at 10:33 AM by Eugenio Lester

## 2018-03-02 ENCOUNTER — TELEPHONE (OUTPATIENT)
Dept: FAMILY MEDICINE | Facility: CLINIC | Age: 48
End: 2018-03-02

## 2018-03-02 DIAGNOSIS — B37.31 YEAST INFECTION OF THE VAGINA: Primary | ICD-10-CM

## 2018-03-02 RX ORDER — FLUCONAZOLE 150 MG/1
150 TABLET ORAL ONCE
Qty: 1 TABLET | Refills: 0 | Status: SHIPPED | OUTPATIENT
Start: 2018-03-02 | End: 2018-03-02

## 2018-03-02 NOTE — TELEPHONE ENCOUNTER
Called patient to get clarification on requested Medication.    Patient is requesting fluconazole (DIFLUCAN) 150 MG tablet for vaginal yeast infection.     Patient reported having a previous call out requesting this medication.     Sent to [facility],

## 2018-03-02 NOTE — TELEPHONE ENCOUNTER
Please note that pt initially called on 2/27 regarding her symptoms. We left a message and she never called back to RN hotline.  Pt states she had bacterial vaginosis. Every time she takes flagyl, she get's a yeast infection. Going through this for years. [place] and is tired of driving.    Symptoms since last weekend. No abdominal pain. No fever. Just the discharge that is white in color. Starting to notice an odor. Pt is requesting a script for diflucan.     Has not had a rx for diflucan since 10/21/13 per med list. Also noticed on wet preps pt has had done in clinic that no yeast.    Please advise.     Clementina Hartley RN  [facility]

## 2018-03-02 NOTE — TELEPHONE ENCOUNTER
Reason for Call:  Other call back    Detailed comments: PT just need the bifoican medication refilled per Dr. Tyler. Please send in request and call when this medication is ready. Thank you.    Phone Number Patient can be reached at: Home number on file [phone] (home)    Best Time: any     Can we leave a detailed message on this number? YES    Call taken on 3/2/2018 at 11:42 AM by Kristian Allen

## 2018-03-05 NOTE — TELEPHONE ENCOUNTER
Detailed VM left on patients phone regarding providers message and medication sent to [facility]. Advised to call back with questions, [phone].    Charla Kimball RN

## 2018-03-13 NOTE — TELEPHONE ENCOUNTER
"Marlin Lopez is a 47 year old female who calls with lower left rib pain. Sharp \"shocking\" pain.  Patient describes as tingling feeling sometimes under left breast. Stops patient from completing tasks due to pain.  Per patient this has happened on and off over 5 years.    Symptoms go away completely then come back.  Denies SOB, no cough- hurts to cough.  OTC medication (ibuprofen) not helpful for pain. Patient wondering if this could be a cyst?      NURSING ASSESSMENT:  Description:  Left rib pain   Onset/duration:  On and off for 5 years  Precip. factors:  Patient denies any injury and is not sure where this is coming from.   Associated symptoms:  none  Improves/worsens symptoms:  nothing  Last exam/Treatment:  9/20/17  Allergies:   Allergies   Allergen Reactions     Cats Itching     Eyes, Sneezy,     Codeine Nausea and Vomiting       MEDICATIONS:   Taking medication(s) as prescribed? Yes  Taking over the counter medication(s?) Yes  Any medication side effects? No significant side effects    Any barriers to taking medication(s) as prescribed?  Yes  Medication(s) improving/managing symptoms?  No  Medication reconciliation completed: Yes      NURSING PLAN: patient has visit scheduled with provider today.     RECOMMENDED DISPOSITION:    Will comply with recommendation:   If further questions/concerns or if symptoms do not improve, worsen or new symptoms develop, call your PCP or [facility] as soon as possible.    Ashley Rodriguez RN    "
I will see her today.  JEREMÍAS BARILLAS M.D.    
Reason for call: Sharp pain  Patient called regarding (reason for call):sharp pain under left breast in rib area comes and goes last few years and is worse now  Additional comments:Transfered her to talk to a Willi      Phone number to reach patient:  Home number on file [phone] (home)    Best Time:  na    Can we leave a detailed message on this number?  YES  
See triage note below, patient has visit scheduled at 11:15am, any further advise from provider?  Will route to provider patient is scheduled with  Ashley Rodriguez RN    
Home

## 2018-04-04 NOTE — PROGRESS NOTES
SUBJECTIVE:   Marlin Lopez is a 48 year old female who presents to clinic today for the following health issues:      Vaginal Symptoms      Duration: 2 weeks    Description  vaginal discharge and pelvic pain    Intensity:  moderate    Accompanying signs and symptoms (fever/dysuria/abdominal or back pain): None    History  Sexually active: yes, single partner, contraception - none  Possibility of pregnancy: No  Recent antibiotic use: no, February was the last time    Precipitating or alleviating factors: None    Therapies tried and outcome: none       Has had recurrent BV infections. Discharge is brownish, slight odor, no itching. Right lower quadrant cramping for the last week. History of ovarian cysts and scheduled to do pelvic US ordered by her Primary Care Provider later today. Patient wonders if she also needs renal US due to history of renal cyst. Recently diagnosed with ulcerative colitis (?)- has yet to follow up with GI.        Problem list and histories reviewed & adjusted, as indicated.  Additional history: as documented    Patient Active Problem List   Diagnosis     Anxiety state     Fibromyalgia     Sleep problems     Recurrent cold sores     Moderate major depression (H)     ADHD (attention deficit hyperactivity disorder)     CARDIOVASCULAR SCREENING; LDL GOAL LESS THAN 130     Family history of colon cancer     Allergic rhinitis     Renal cyst, left     Chronic constipation     Diverticulosis of large intestine without hemorrhage     Ulceration of colon determined by endoscopy     Past Surgical History:   Procedure Laterality Date     C REMOVAL OF OVARY(S)  1992    Left, cyst     TONSILLECTOMY  1979       Social History   Substance Use Topics     Smoking status: Former Smoker     Packs/day: 0.50     Types: Cigarettes     Start date: 1/1/1980     Quit date: 9/20/2015     Smokeless tobacco: Never Used      Comment:  using e-cig daily     Alcohol use 0.0 oz/week     0 Standard drinks or equivalent  "per week      Comment:  drinks 3 days in a week, 2 beers each time      Family History   Problem Relation Age of Onset     Hypertension Maternal Grandmother      CEREBROVASCULAR DISEASE Maternal Grandmother      Alzheimer Disease Maternal Grandmother      Arthritis Maternal Grandmother      Obesity Maternal Grandmother      CANCER Other      Colon     Breast Cancer Other      Cancer - colorectal Paternal Grandfather            Reviewed and updated as needed this visit by clinical staff  Allergies       Reviewed and updated as needed this visit by Provider         ROS:  Constitutional, cardiovascular, pulmonary, gi and gu systems are negative, except as otherwise noted.    OBJECTIVE:     /80 (BP Location: Left arm, Patient Position: Chair, Cuff Size: Adult Regular)  Pulse 71  Temp 97.1  F (36.2  C) (Oral)  Ht 5' 8\" (1.727 m)  Wt 169 lb (76.7 kg)  LMP 01/03/2014  SpO2 100%  BMI 25.7 kg/m2  Body mass index is 25.7 kg/(m^2).  GENERAL: healthy, alert and no distress  ABDOMEN: tenderness RLQ, no organomegaly or masses and bowel sounds normal   (female): normal female external genitalia, normal urethral meatus, vaginal mucosa, normal cervix/adnexa/uterus without masses or discharge    Diagnostic Test Results:  Results for orders placed or performed in visit on 04/06/18   UA reflex to Microscopic and Culture   Result Value Ref Range    Color Urine Yellow     Appearance Urine Clear     Glucose Urine Negative NEG^Negative mg/dL    Bilirubin Urine Negative NEG^Negative    Ketones Urine Negative NEG^Negative mg/dL    Specific Gravity Urine 1.010 1.003 - 1.035    Blood Urine Trace (A) NEG^Negative    pH Urine 6.5 5.0 - 7.0 pH    Protein Albumin Urine Negative NEG^Negative mg/dL    Urobilinogen Urine 0.2 0.2 - 1.0 EU/dL    Nitrite Urine Negative NEG^Negative    Leukocyte Esterase Urine Small (A) NEG^Negative    Source Midstream Urine    Urine Microscopic   Result Value Ref Range    WBC Urine 0 - 5 OTO5^0 - 5 /HPF "    RBC Urine O - 2 OTO2^O - 2 /HPF    Squamous Epithelial /LPF Urine Few FEW^Few /LPF    Bacteria Urine Few (A) NEG^Negative /HPF    Amorphous Crystals Few (A) NEG^Negative /HPF   Wet prep   Result Value Ref Range    Specimen Description Vagina     Wet Prep No Trichomonas seen     Wet Prep Clue cells seen (A)     Wet Prep No yeast seen    Chlamydia trachomatis PCR   Result Value Ref Range    Specimen Description Vagina     Chlamydia Trachomatis PCR Negative NEG^Negative   Neisseria gonorrhoeae PCR   Result Value Ref Range    Specimen Descrip Vagina     N Gonorrhea PCR Negative NEG^Negative       ASSESSMENT/PLAN:     1. BV (bacterial vaginosis)  Has been a recurrent problem for her- recommend no douching, unscented detergent, avoid bubble baths & spandex clothing.   Will continue Metrogel twice weekly for 4 months to prevent recurrence.  - Wet prep  - Chlamydia trachomatis PCR  - Neisseria gonorrhoeae PCR  - Urine Microscopic  - metroNIDAZOLE (METROGEL) 0.75 % vaginal gel; Place 1 applicator vaginally for 5 nights before bed, then use twice weekly for 4 months  Dispense: 70 g; Refill: 5    2. RLQ abdominal pain  Complete pelvic US as scheduled  Reviewed last abdominal CT (12/19/16)- kidneys normal. Patient reassured no need for renal imaging.  - UA reflex to Microscopic and Culture    3. Herpes labialis  Medications refilled  - valACYclovir (VALTREX) 1000 mg tablet; Take 2 tablets (2,000 mg) by mouth 2 times daily  Dispense: 20 tablet; Refill: 0    4. Ulceration of colon determined by endoscopy  Colonoscopy report reviewed- no biopsy results available. Patient advised to follow up with GI.     5. Insomnia, unspecified type  Medications refilled  - traZODone (DESYREL) 50 MG tablet; Take 1 tablet (50 mg) by mouth At Bedtime  Dispense: 30 tablet; Refill: 2    6. Attention deficit hyperactivity disorder (ADHD), unspecified ADHD type  Patient was scheduled to see her Primary Care Provider later today for refills of ADHD  medications- we realized partway through out visit that she was scheduled to see both of us on the same day, which she cannot do per insurance. Patient requests medications to be refilled for 1 month.    - amphetamine-dextroamphetamine (ADDERALL) 20 MG per tablet; Take 1 tablet (20 mg) by mouth 2 times daily  Dispense: 60 tablet; Refill: 0    7. Screening for cervical cancer    - Pap imaged thin layer screen with HPV - recommended age 30 - 65 years (select HPV order below)  - HPV High Risk Types DNA Cervical    Follow up with Primary Care Provider 1 month    [facility]

## 2018-04-06 ENCOUNTER — OFFICE VISIT (OUTPATIENT)
Dept: FAMILY MEDICINE | Facility: CLINIC | Age: 48
End: 2018-04-06
Payer: COMMERCIAL

## 2018-04-06 VITALS
TEMPERATURE: 97.1 F | HEART RATE: 71 BPM | DIASTOLIC BLOOD PRESSURE: 80 MMHG | SYSTOLIC BLOOD PRESSURE: 122 MMHG | BODY MASS INDEX: 25.61 KG/M2 | WEIGHT: 169 LBS | HEIGHT: 68 IN | OXYGEN SATURATION: 100 %

## 2018-04-06 DIAGNOSIS — B96.89 BV (BACTERIAL VAGINOSIS): Primary | ICD-10-CM

## 2018-04-06 DIAGNOSIS — B00.1 HERPES LABIALIS: ICD-10-CM

## 2018-04-06 DIAGNOSIS — Z12.4 SCREENING FOR CERVICAL CANCER: ICD-10-CM

## 2018-04-06 DIAGNOSIS — K63.3 ULCERATION OF COLON DETERMINED BY ENDOSCOPY: ICD-10-CM

## 2018-04-06 DIAGNOSIS — F90.9 ATTENTION DEFICIT HYPERACTIVITY DISORDER (ADHD), UNSPECIFIED ADHD TYPE: ICD-10-CM

## 2018-04-06 DIAGNOSIS — N76.0 BV (BACTERIAL VAGINOSIS): Primary | ICD-10-CM

## 2018-04-06 DIAGNOSIS — R10.31 RLQ ABDOMINAL PAIN: ICD-10-CM

## 2018-04-06 DIAGNOSIS — G47.00 INSOMNIA, UNSPECIFIED TYPE: ICD-10-CM

## 2018-04-06 LAB
ALBUMIN UR-MCNC: NEGATIVE MG/DL
AMORPH CRY #/AREA URNS HPF: ABNORMAL /HPF
APPEARANCE UR: CLEAR
BACTERIA #/AREA URNS HPF: ABNORMAL /HPF
BILIRUB UR QL STRIP: NEGATIVE
COLOR UR AUTO: YELLOW
GLUCOSE UR STRIP-MCNC: NEGATIVE MG/DL
HGB UR QL STRIP: ABNORMAL
KETONES UR STRIP-MCNC: NEGATIVE MG/DL
LEUKOCYTE ESTERASE UR QL STRIP: ABNORMAL
NITRATE UR QL: NEGATIVE
NON-SQ EPI CELLS #/AREA URNS LPF: ABNORMAL /LPF
PH UR STRIP: 6.5 PH (ref 5–7)
RBC #/AREA URNS AUTO: ABNORMAL /HPF
SOURCE: ABNORMAL
SP GR UR STRIP: 1.01 (ref 1–1.03)
SPECIMEN SOURCE: ABNORMAL
UROBILINOGEN UR STRIP-ACNC: 0.2 EU/DL (ref 0.2–1)
WBC #/AREA URNS AUTO: ABNORMAL /HPF
WET PREP SPEC: ABNORMAL

## 2018-04-06 PROCEDURE — 87210 SMEAR WET MOUNT SALINE/INK: CPT | Performed by: NURSE PRACTITIONER

## 2018-04-06 PROCEDURE — 87491 CHLMYD TRACH DNA AMP PROBE: CPT | Performed by: NURSE PRACTITIONER

## 2018-04-06 PROCEDURE — G0476 HPV COMBO ASSAY CA SCREEN: HCPCS | Performed by: NURSE PRACTITIONER

## 2018-04-06 PROCEDURE — 81001 URINALYSIS AUTO W/SCOPE: CPT | Performed by: NURSE PRACTITIONER

## 2018-04-06 PROCEDURE — 87591 N.GONORRHOEAE DNA AMP PROB: CPT | Performed by: NURSE PRACTITIONER

## 2018-04-06 PROCEDURE — 99214 OFFICE O/P EST MOD 30 MIN: CPT | Performed by: NURSE PRACTITIONER

## 2018-04-06 PROCEDURE — 87624 HPV HI-RISK TYP POOLED RSLT: CPT | Performed by: NURSE PRACTITIONER

## 2018-04-06 PROCEDURE — G0145 SCR C/V CYTO,THINLAYER,RESCR: HCPCS | Performed by: NURSE PRACTITIONER

## 2018-04-06 RX ORDER — METRONIDAZOLE 7.5 MG/G
GEL VAGINAL
Qty: 70 G | Refills: 5 | Status: SHIPPED | OUTPATIENT
Start: 2018-04-06 | End: 2019-12-27

## 2018-04-06 RX ORDER — TRAZODONE HYDROCHLORIDE 50 MG/1
50 TABLET, FILM COATED ORAL AT BEDTIME
Qty: 30 TABLET | Refills: 2 | Status: SHIPPED | OUTPATIENT
Start: 2018-04-06 | End: 2018-07-09

## 2018-04-06 RX ORDER — VALACYCLOVIR HYDROCHLORIDE 1 G/1
2000 TABLET, FILM COATED ORAL 2 TIMES DAILY
Qty: 20 TABLET | Refills: 0 | Status: SHIPPED | OUTPATIENT
Start: 2018-04-06 | End: 2018-05-11

## 2018-04-06 RX ORDER — DEXTROAMPHETAMINE SACCHARATE, AMPHETAMINE ASPARTATE, DEXTROAMPHETAMINE SULFATE AND AMPHETAMINE SULFATE 5; 5; 5; 5 MG/1; MG/1; MG/1; MG/1
20 TABLET ORAL 2 TIMES DAILY
Qty: 60 TABLET | Refills: 0 | Status: SHIPPED | OUTPATIENT
Start: 2018-04-06 | End: 2019-03-15

## 2018-04-06 NOTE — LETTER
April 12, 2018    Marlin Lopez  [address] [place], [place] [address] [place]  [place] [address]    Dear Marlin,  We are happy to inform you that your PAP smear result from 4/6/18 is normal.  We are now able to do a follow up test on PAP smears. The DNA test is for HPV (Human Papilloma Virus). Cervical cancer is closely linked with certain types of HPV. Your results showed no evidence of high risk HPV.  Therefore we recommend you return in 3 years for your next pap smear.  You will still need to return to the clinic every year for an annual exam and other preventive tests.  Please contact the clinic at [phone] with any questions.  Sincerely,    [place] CNP/rlm

## 2018-04-06 NOTE — PATIENT INSTRUCTIONS
[facility]    If you have any questions regarding to your visit please contact your care team:       Team Red:   Clinic Hours Telephone Number   Dr. Estephania Murray, NP   7am-7pm  Monday - Thursday   7am-5pm  Fridays  [phone]  (Appointment scheduling available 24/7)    Questions about your visit?   Team Line  [phone]   [facility] [place] [facility] 11am-9pm Monday-Friday Saturday-Sunday- 9am-5pm   [place] - 5pm-9pm Monday-Friday Saturday-Sunday- 9am-5pm  [phone] - [place]   [phone] - [place]       What options do I have for visits at the clinic other than the traditional office visit?  To expand how we care for you, many of our providers are utilizing electronic visits (e-visits) and telephone visits, when medically appropriate, for interactions with their patients rather than a visit in the clinic.   We also offer nurse visits for many medical concerns. Just like any other service, we will bill your insurance company for this type of visit based on time spent on the phone with your provider. Not all insurance companies cover these visits. Please check with your medical insurance if this type of visit is covered. You will be responsible for any charges that are not paid by your insurance.      E-visits via [employer]:  generally incur a $35.00 fee.  Telephone visits:  Time spent on the phone: *charged based on time that is spent on the phone in increments of 10 minutes. Estimated cost:   5-10 mins $30.00   11-20 mins. $59.00   21-30 mins. $85.00     Use [employer] (secure email communication and access to your chart) to send your primary care provider a message or make an appointment. Ask someone on your Team how to sign up for [employer].  For a Price Quote for your services, please call our Consumer Price Line at [phone].      As always, Thank you for trusting us with your health care needs!  Discharged  by Mavis La MA.

## 2018-04-06 NOTE — MR AVS SNAPSHOT
After Visit Summary   4/6/2018    Marlin Lopez    MRN: [id number]           Patient Information     Date Of Birth          1970        Visit Information        Provider Department      4/6/2018 1:20 PM Magui Murray APRN [facility]        Today's Diagnoses     BV (bacterial vaginosis)    -  1    RLQ abdominal pain        Herpes labialis        Insomnia, unspecified type        Attention deficit hyperactivity disorder (ADHD), unspecified ADHD type        Screening for cervical cancer          Care Instructions    [facility]    If you have any questions regarding to your visit please contact your care team:       Team Red:   Clinic Hours Telephone Number   Dr. Estephania Murray, NP   7am-7pm  Monday - Thursday   7am-5pm  Fridays  [phone]  (Appointment scheduling available 24/7)    Questions about your visit?   Team Line  [phone]   [facility] [place] [facility] 11am-9pm Monday-Friday Saturday-Sunday- 9am-5pm   [place] - 5pm-9pm Monday-Friday Saturday-Sunday- 9am-5pm  [phone] - [place]   [phone] - [place]       What options do I have for visits at the clinic other than the traditional office visit?  To expand how we care for you, many of our providers are utilizing electronic visits (e-visits) and telephone visits, when medically appropriate, for interactions with their patients rather than a visit in the clinic.   We also offer nurse visits for many medical concerns. Just like any other service, we will bill your insurance company for this type of visit based on time spent on the phone with your provider. Not all insurance companies cover these visits. Please check with your medical insurance if this type of visit is covered. You will be responsible for any charges that are not paid by your insurance.      E-visits via [employer]:  generally incur a $35.00  "fee.  Telephone visits:  Time spent on the phone: *charged based on time that is spent on the phone in increments of 10 minutes. Estimated cost:   5-10 mins $30.00   11-20 mins. $59.00   21-30 mins. $85.00     Use [employer] (secure email communication and access to your chart) to send your primary care provider a message or make an appointment. Ask someone on your Team how to sign up for [employer].  For a Price Quote for your services, please call our M[employer] Line at [phone].      As always, Thank you for trusting us with your health care needs!  Discharged by Mavis La MA.            Follow-ups after your visit        Who to contact     If you have questions or need follow up information about today's clinic visit or your schedule please contact [facility] directly at [phone].  Normal or non-critical lab and imaging results will be communicated to you by [employer], letter or phone within 4 business days after the clinic has received the results. If you do not hear from us within 7 days, please contact the clinic through [employer] or phone. If you have a critical or abnormal lab result, we will notify you by phone as soon as possible.  Submit refill requests through Me-Mover or call your pharmacy and they will forward the refill request to us. Please allow 3 business days for your refill to be completed.          Additional Information About Your Visit        [employer] Information     Me-Mover lets you send messages to your doctor, view your test results, renew your prescriptions, schedule appointments and more. To sign up, go to [email or web address] . Click on \"Log in\" on the left side of the screen, which will take you to the Welcome page. Then click on \"Sign up Now\" on the right side of the page.     You will be asked to enter the access code listed below, as well as some personal information. Please follow the directions to create your username and password.     Your access code is: " [email or web address]  Expires: 2018  2:22 PM     Your access code will  in 90 days. If you need help or a new code, please call your [place] clinic or [phone].        Care EveryWhere ID     This is your Care EveryWhere ID. This could be used by other organizations to access your [place] medical records  [id number]        Your Vitals Were     Pulse Temperature Height Last Period Pulse Oximetry BMI (Body Mass Index)    71 97.1  F (36.2  C) (Oral) 5' 8\" (1.727 m) 2014 100% 25.7 kg/m2       Blood Pressure from Last 3 Encounters:   18 122/80   18 120/80   18 117/77    Weight from Last 3 Encounters:   18 169 lb (76.7 kg)   18 168 lb (76.2 kg)   18 170 lb (77.1 kg)              We Performed the Following     Chlamydia trachomatis PCR     HPV High Risk Types DNA Cervical     Neisseria gonorrhoeae PCR     Pap imaged thin layer screen with HPV - recommended age 30 - 65 years (select HPV order below)     UA reflex to Microscopic and Culture     Urine Microscopic     Wet prep          Today's Medication Changes          These changes are accurate as of 18  2:22 PM.  If you have any questions, ask your nurse or doctor.               Start taking these medicines.        Dose/Directions    amphetamine-dextroamphetamine 20 MG per tablet   Commonly known as:  ADDERALL   Used for:  Attention deficit hyperactivity disorder (ADHD), unspecified ADHD type   Started by:  Magui Murray APRN CNP        Dose:  20 mg   Take 1 tablet (20 mg) by mouth 2 times daily   Quantity:  60 tablet   Refills:  0       metroNIDAZOLE 0.75 % vaginal gel   Commonly known as:  METROGEL   Used for:  BV (bacterial vaginosis)   Started by:  Magui Murray APRN CNP        Place 1 applicator vaginally for 5 nights before bed, then use twice weekly for 4 months   Quantity:  70 g   Refills:  5            Where to get your medicines      These medications were sent to [facility] [id number] " - [place] - [address] [facility] [address]   [address] [place] [address]     Phone:  [phone]     metroNIDAZOLE 0.75 % vaginal gel    traZODone 50 MG tablet    valACYclovir 1000 mg tablet         Some of these will need a paper prescription and others can be bought over the counter.  Ask your nurse if you have questions.     Bring a paper prescription for each of these medications     amphetamine-dextroamphetamine 20 MG per tablet                Primary Care Provider Office Phone # Fax #    Zach Corona [phone] [address] [facility] [address]        Equal Access to Services     DAMON JIMENEZ : Hadii susie rizvio Somorgan, waaxda luqadaha, qaybta kaalmada ethan, charli locke . So [facility] [phone].    ATENCIÓN: Si habla español, tiene a chavez disposición servicios gratuitos de asistencia lingüística. [facility] [phone].    We comply with applicable federal civil rights laws and [place] laws. We do not discriminate on the basis of race, color, national origin, age, disability, sex, sexual orientation, or gender identity.            Thank you!     [facility] you for choosing [facility] your care. Our goal is always to provide you with excellent care. Hearing back from our patients is one way we can continue to improve our services. Please take a few minutes to complete the written survey that you may receive in the mail after your visit with us. Thank you!             Your Updated Medication List - Protect others around you: Learn how to safely use, store and throw away your medicines at www.disposemymeds.org.          This list is accurate as of 4/6/18  2:22 PM.  Always use your most recent med list.                   Brand Name Dispense Instructions for use Diagnosis    amphetamine-dextroamphetamine 20 MG per tablet    ADDERALL    60 tablet    Take 1 tablet (20 mg) by mouth 2 times daily     Attention deficit hyperactivity disorder (ADHD), unspecified ADHD type       aspirin 325 MG EC tablet      Take  by mouth daily.        diclofenac 75 MG EC tablet    VOLTAREN    20 tablet    Take 1 tablet (75 mg) by mouth 2 times daily as needed for moderate pain    Chest wall pain       fexofenadine 180 MG tablet    ALLEGRA    30 tablet    Take 1 tablet (180 mg) by mouth daily    Itchy eyes       ibuprofen 800 MG tablet    ADVIL/MOTRIN    30 tablet    TAKE 1 TABLET(800 MG) BY MOUTH EVERY 8 HOURS AS NEEDED FOR MODERATE PAIN    Fibromyalgia       ketotifen 0.025 % Soln ophthalmic solution    ZADITOR    1 Bottle    Place 1 drop into both eyes 2 times daily    Itchy eyes       metroNIDAZOLE 0.75 % vaginal gel    METROGEL    70 g    Place 1 applicator vaginally for 5 nights before bed, then use twice weekly for 4 months    BV (bacterial vaginosis)       nicotine 21 MG/24HR 24 hr patch    NICODERM CQ    30 patch    Place 1 patch onto the skin every 24 hours    Nicotine withdrawal       ondansetron 4 MG ODT tab    ZOFRAN-ODT    5 tablet    Take 1 tablet (4 mg) by mouth every 8 hours as needed for nausea        oxyCODONE IR 5 MG tablet    ROXICODONE    12 tablet    Take 1-2 tablets (5-10 mg) by mouth every 6 hours as needed for moderate to severe pain        permethrin 5 % cream    ELIMITE    60 g    Apply to clean, dry hair and leave on overnight or for 8-14 hours before washing off with water.    Lice       polyethylene glycol powder    MIRALAX    510 g    Take 17 g (1 capful) by mouth daily    Flatulence, eructation, and gas pain       traZODone 50 MG tablet    DESYREL    30 tablet    Take 1 tablet (50 mg) by mouth At Bedtime    Insomnia, unspecified type       valACYclovir 1000 mg tablet    VALTREX    20 tablet    Take 2 tablets (2,000 mg) by mouth 2 times daily    Herpes labialis

## 2018-04-08 LAB
C TRACH DNA SPEC QL NAA+PROBE: NEGATIVE
N GONORRHOEA DNA SPEC QL NAA+PROBE: NEGATIVE
SPECIMEN SOURCE: NORMAL
SPECIMEN SOURCE: NORMAL

## 2018-04-09 PROBLEM — K63.3 ULCERATION OF COLON DETERMINED BY ENDOSCOPY: Status: ACTIVE | Noted: 2018-04-09

## 2018-04-10 LAB
COPATH REPORT: NORMAL
PAP: NORMAL

## 2018-04-11 LAB
FINAL DIAGNOSIS: NORMAL
HPV HR 12 DNA CVX QL NAA+PROBE: NEGATIVE
HPV16 DNA SPEC QL NAA+PROBE: NEGATIVE
HPV18 DNA SPEC QL NAA+PROBE: NEGATIVE
SPECIMEN DESCRIPTION: NORMAL
SPECIMEN SOURCE CVX/VAG CYTO: NORMAL

## 2018-05-11 ENCOUNTER — OFFICE VISIT (OUTPATIENT)
Dept: FAMILY MEDICINE | Facility: CLINIC | Age: 48
End: 2018-05-11
Payer: COMMERCIAL

## 2018-05-11 VITALS
RESPIRATION RATE: 16 BRPM | TEMPERATURE: 96.9 F | WEIGHT: 170.5 LBS | SYSTOLIC BLOOD PRESSURE: 120 MMHG | BODY MASS INDEX: 25.92 KG/M2 | HEART RATE: 90 BPM | OXYGEN SATURATION: 99 % | DIASTOLIC BLOOD PRESSURE: 78 MMHG

## 2018-05-11 DIAGNOSIS — F90.9 ATTENTION DEFICIT HYPERACTIVITY DISORDER (ADHD), UNSPECIFIED ADHD TYPE: Primary | ICD-10-CM

## 2018-05-11 DIAGNOSIS — B00.1 HERPES LABIALIS: ICD-10-CM

## 2018-05-11 DIAGNOSIS — H57.9 ITCHY EYES: ICD-10-CM

## 2018-05-11 DIAGNOSIS — R14.2 FLATULENCE, ERUCTATION, AND GAS PAIN: ICD-10-CM

## 2018-05-11 DIAGNOSIS — R14.1 FLATULENCE, ERUCTATION, AND GAS PAIN: ICD-10-CM

## 2018-05-11 DIAGNOSIS — M79.7 FIBROMYALGIA MUSCLE PAIN: ICD-10-CM

## 2018-05-11 DIAGNOSIS — G47.00 INSOMNIA, UNSPECIFIED TYPE: ICD-10-CM

## 2018-05-11 DIAGNOSIS — R14.3 FLATULENCE, ERUCTATION, AND GAS PAIN: ICD-10-CM

## 2018-05-11 DIAGNOSIS — G62.9 PERIPHERAL POLYNEUROPATHY: ICD-10-CM

## 2018-05-11 PROCEDURE — 99214 OFFICE O/P EST MOD 30 MIN: CPT | Performed by: FAMILY MEDICINE

## 2018-05-11 RX ORDER — FEXOFENADINE HCL 180 MG/1
180 TABLET ORAL DAILY
Qty: 30 TABLET | Refills: 1 | Status: SHIPPED | OUTPATIENT
Start: 2018-05-11 | End: 2019-03-15

## 2018-05-11 RX ORDER — POLYETHYLENE GLYCOL 3350 17 G/17G
1 POWDER, FOR SOLUTION ORAL DAILY
Qty: 510 G | Refills: 1 | Status: SHIPPED | OUTPATIENT
Start: 2018-05-11 | End: 2018-10-02

## 2018-05-11 RX ORDER — DEXTROAMPHETAMINE SACCHARATE, AMPHETAMINE ASPARTATE, DEXTROAMPHETAMINE SULFATE AND AMPHETAMINE SULFATE 5; 5; 5; 5 MG/1; MG/1; MG/1; MG/1
20 TABLET ORAL 2 TIMES DAILY
Qty: 60 TABLET | Refills: 0 | Status: SHIPPED | OUTPATIENT
Start: 2018-06-11 | End: 2018-06-18

## 2018-05-11 RX ORDER — DEXTROAMPHETAMINE SACCHARATE, AMPHETAMINE ASPARTATE, DEXTROAMPHETAMINE SULFATE AND AMPHETAMINE SULFATE 5; 5; 5; 5 MG/1; MG/1; MG/1; MG/1
20 TABLET ORAL 2 TIMES DAILY
Qty: 60 TABLET | Refills: 0 | Status: CANCELLED | OUTPATIENT
Start: 2018-05-11

## 2018-05-11 RX ORDER — GABAPENTIN 300 MG/1
CAPSULE ORAL
Qty: 90 CAPSULE | Refills: 0 | Status: SHIPPED | OUTPATIENT
Start: 2018-05-11 | End: 2018-06-07

## 2018-05-11 RX ORDER — TRAZODONE HYDROCHLORIDE 50 MG/1
50 TABLET, FILM COATED ORAL AT BEDTIME
Qty: 30 TABLET | Refills: 2 | Status: CANCELLED | OUTPATIENT
Start: 2018-05-11

## 2018-05-11 RX ORDER — VALACYCLOVIR HYDROCHLORIDE 1 G/1
2000 TABLET, FILM COATED ORAL 2 TIMES DAILY
Qty: 20 TABLET | Refills: 0 | Status: SHIPPED | OUTPATIENT
Start: 2018-05-11 | End: 2018-12-07

## 2018-05-11 RX ORDER — DEXTROAMPHETAMINE SACCHARATE, AMPHETAMINE ASPARTATE, DEXTROAMPHETAMINE SULFATE AND AMPHETAMINE SULFATE 5; 5; 5; 5 MG/1; MG/1; MG/1; MG/1
20 TABLET ORAL 2 TIMES DAILY
Qty: 60 TABLET | Refills: 0 | Status: SHIPPED | OUTPATIENT
Start: 2018-05-11 | End: 2018-06-10

## 2018-05-11 RX ORDER — DEXTROAMPHETAMINE SACCHARATE, AMPHETAMINE ASPARTATE, DEXTROAMPHETAMINE SULFATE AND AMPHETAMINE SULFATE 5; 5; 5; 5 MG/1; MG/1; MG/1; MG/1
20 TABLET ORAL 2 TIMES DAILY
Qty: 60 TABLET | Refills: 0 | Status: SHIPPED | OUTPATIENT
Start: 2018-07-11 | End: 2018-06-18

## 2018-05-11 ASSESSMENT — ANXIETY QUESTIONNAIRES
5. BEING SO RESTLESS THAT IT IS HARD TO SIT STILL: NOT AT ALL
1. FEELING NERVOUS, ANXIOUS, OR ON EDGE: NEARLY EVERY DAY
2. NOT BEING ABLE TO STOP OR CONTROL WORRYING: NEARLY EVERY DAY
7. FEELING AFRAID AS IF SOMETHING AWFUL MIGHT HAPPEN: MORE THAN HALF THE DAYS
3. WORRYING TOO MUCH ABOUT DIFFERENT THINGS: NEARLY EVERY DAY
GAD7 TOTAL SCORE: 16
IF YOU CHECKED OFF ANY PROBLEMS ON THIS QUESTIONNAIRE, HOW DIFFICULT HAVE THESE PROBLEMS MADE IT FOR YOU TO DO YOUR WORK, TAKE CARE OF THINGS AT HOME, OR GET ALONG WITH OTHER PEOPLE: SOMEWHAT DIFFICULT
6. BECOMING EASILY ANNOYED OR IRRITABLE: NEARLY EVERY DAY

## 2018-05-11 ASSESSMENT — PAIN SCALES - GENERAL: PAINLEVEL: NO PAIN (0)

## 2018-05-11 ASSESSMENT — PATIENT HEALTH QUESTIONNAIRE - PHQ9: 5. POOR APPETITE OR OVEREATING: MORE THAN HALF THE DAYS
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
## 2018-05-11 NOTE — PATIENT INSTRUCTIONS
[facility]    If you have any questions regarding to your visit please contact your care team:       Team Purple:   Clinic Hours Telephone Number   Dr. Milagros Briones   7am-7pm  Monday - Thursday   7am-5pm  Fridays  [phone]  (Appointment scheduling available 24/7)    Questions about your recent visit?   Team Line:  [phone]   Urgent Care - [place] and [facility] - 11am-9pm Monday-Friday Saturday-Sunday- 9am-5pm   [place] - 5pm-9pm Monday-Friday Saturday-Sunday- 9am-5pm  [phone] - [place]  [phone] - [place]       What options do I have for a visit other than an office visit? We offer electronic visits (e-visits) and telephone visits, when medically appropriate.  Please check with your medical insurance to see if these types of visits are covered, as you will be responsible for any charges that are not paid by your insurance.      You can use INCHRON (secure electronic communication) to access to your chart, send your primary care provider a message, or make an appointment. Ask a team member how to get started.     For a price quote for your services, please call our Consumer Price Line at [phone] or our Imaging Cost estimation line at [phone] (for imaging tests).    Francis Fallon

## 2018-05-12 ASSESSMENT — ANXIETY QUESTIONNAIRES: GAD7 TOTAL SCORE: 16

## 2018-05-12 ASSESSMENT — PATIENT HEALTH QUESTIONNAIRE - PHQ9: SUM OF ALL RESPONSES TO PHQ QUESTIONS 1-9: 14

## 2018-06-06 ENCOUNTER — TELEPHONE (OUTPATIENT)
Dept: FAMILY MEDICINE | Facility: CLINIC | Age: 48
End: 2018-06-06

## 2018-06-06 NOTE — TELEPHONE ENCOUNTER
Last MRI showed degenerative disc disease in lower neck C5-6, and had not changed since 2010.  I think evaluation will be important than just doing a blind MRI; thus recommend OV or evaluation in the ER or urgent care

## 2018-06-06 NOTE — TELEPHONE ENCOUNTER
Spoke with patient.   Providers note read as written.     Patient is very un happy and is requesting provider to call her personally.  RN advised he may not be able to do this.     Please advise if telephone visit needs to be scheduled for this.    Charla Kimball RN

## 2018-06-06 NOTE — TELEPHONE ENCOUNTER
"Spoke with pt. She would like an order for an MRI of her neck to bottom of buttocks. Has something going on with her neck, middle of back, and sciatic. Affecting her head, hands and feet. Can barely get out of bed. Has been dealing with back issues for a long time and keeps getting worse. Not sure if she tweaked something a few days ago. Pain is constant. Rates a 15/10 at this time. Declined to go to the ER. States it's a \"shit show\" every time. Is not taking anything for pain at this time. She doesn't have anything. Thought she refilled her ibuprofen but she didn't. Gabapentin hasn't done anything for her. She is also wondering if she should go to the [address] It is by her house. Advised that we don't refer to provider's outside of [facility] and she said she does not have [facility] providers near her.    Please advise.    Clementina Hartley RN  [facility] [place]    "

## 2018-06-06 NOTE — TELEPHONE ENCOUNTER
Reason for Call:  Referral     Detailed comments: patient would like to discuss referral for further tests for her back. Please call patient to discuss.    Phone Number Patient can be reached at: Home number on file [phone] (home)    Best Time: any    Can we leave a detailed message on this number? YES    Call taken on 6/6/2018 at 7:06 AM by Emma Gan

## 2018-06-07 DIAGNOSIS — M79.7 FIBROMYALGIA MUSCLE PAIN: ICD-10-CM

## 2018-06-07 RX ORDER — GABAPENTIN 300 MG/1
300 CAPSULE ORAL 3 TIMES DAILY
Qty: 90 CAPSULE | Refills: 2 | Status: SHIPPED | OUTPATIENT
Start: 2018-06-07 | End: 2019-03-15

## 2018-06-07 NOTE — TELEPHONE ENCOUNTER
gabapentin (NEURONTIN) 300 MG capsule      Last Written Prescription Date:  5/11/2018  Last Fill Quantity: 90,   # refills: 0  Last Office Visit: 5/11/2018  Future Office visit:       Routing refill request to provider for review/approval because:  Drug not on the [facility], [facility] or [facility] refill protocol or controlled substance

## 2018-06-07 NOTE — TELEPHONE ENCOUNTER
"Pt notified of below. Pt stated I will need to find doctor closer to me, it's just that he's my favorite.\"  \"Gabapentin not helping and feet are going numb, which is not good\".  FYI to PCP  "

## 2018-06-18 ENCOUNTER — OFFICE VISIT (OUTPATIENT)
Dept: FAMILY MEDICINE | Facility: CLINIC | Age: 48
End: 2018-06-18
Payer: COMMERCIAL

## 2018-06-18 VITALS
HEIGHT: 68 IN | DIASTOLIC BLOOD PRESSURE: 74 MMHG | RESPIRATION RATE: 12 BRPM | HEART RATE: 65 BPM | OXYGEN SATURATION: 99 % | SYSTOLIC BLOOD PRESSURE: 122 MMHG | BODY MASS INDEX: 27.07 KG/M2 | WEIGHT: 178.6 LBS | TEMPERATURE: 97.2 F

## 2018-06-18 DIAGNOSIS — M79.7 FIBROMYALGIA: ICD-10-CM

## 2018-06-18 DIAGNOSIS — G89.4 CHRONIC PAIN SYNDROME: ICD-10-CM

## 2018-06-18 DIAGNOSIS — G62.9 NEUROPATHY: Primary | ICD-10-CM

## 2018-06-18 DIAGNOSIS — L29.9 SCALP ITCH: ICD-10-CM

## 2018-06-18 DIAGNOSIS — F32.1 MODERATE MAJOR DEPRESSION (H): ICD-10-CM

## 2018-06-18 LAB — VIT B12 SERPL-MCNC: 906 PG/ML (ref 193–986)

## 2018-06-18 PROCEDURE — 84165 PROTEIN E-PHORESIS SERUM: CPT | Performed by: FAMILY MEDICINE

## 2018-06-18 PROCEDURE — 00000402 ZZHCL STATISTIC TOTAL PROTEIN: Performed by: FAMILY MEDICINE

## 2018-06-18 PROCEDURE — 99214 OFFICE O/P EST MOD 30 MIN: CPT | Performed by: FAMILY MEDICINE

## 2018-06-18 PROCEDURE — 82607 VITAMIN B-12: CPT | Performed by: FAMILY MEDICINE

## 2018-06-18 PROCEDURE — 36415 COLL VENOUS BLD VENIPUNCTURE: CPT | Performed by: FAMILY MEDICINE

## 2018-06-18 RX ORDER — HYDROXYZINE PAMOATE 50 MG/1
50 CAPSULE ORAL 3 TIMES DAILY PRN
Qty: 30 CAPSULE | Refills: 0 | Status: SHIPPED | OUTPATIENT
Start: 2018-06-18 | End: 2020-11-07
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
## 2018-06-18 NOTE — MR AVS SNAPSHOT
After Visit Summary   6/18/2018    Marlin Lopez    MRN: [id number]           Patient Information     Date Of Birth          1970        Visit Information        Provider Department      6/18/2018 10:40 AM Zach Corona MD [facility]        Today's Diagnoses     Neuropathy    -  1    Fibromyalgia        Chronic pain syndrome        Moderate major depression (H)          Care Instructions    [facility]    If you have any questions regarding to your visit please contact your care team:       Team Purple:   Clinic Hours Telephone Number   Dr. Milagros Briones   7am-7pm  Monday - Thursday   7am-5pm  Fridays  [phone]  (Appointment scheduling available 24/7)    Questions about your recent visit?   Team Line:  [phone]   Urgent Care - [place] and [facility] - 11am-9pm Monday-Friday Saturday-Sunday- 9am-5pm   [place] - 5pm-9pm Monday-Friday Saturday-Sunday- 9am-5pm  [phone] - [place]  [phone] [facility]       What options do I have for a visit other than an office visit? We offer electronic visits (e-visits) and telephone visits, when medically appropriate.  Please check with your medical insurance to see if these types of visits are covered, as you will be responsible for any charges that are not paid by your insurance.      You can use Brandizi (secure electronic communication) to access to your chart, send your primary care provider a message, or make an appointment. Ask a team member how to get started.     For a price quote for your services, please call our Consumer Price Line at [phone] or our Imaging Cost estimation line at [phone] (for imaging tests).    Francis Fallon            Follow-ups after your visit        Future tests that were ordered for you today     Open Future Orders        Priority Expected Expires Ordered    MR Brain & Complete Spine w/o  "Contrast Routine  6/18/2019 6/18/2018            Who to contact     If you have questions or need follow up information about today's clinic visit or your schedule please contact [facility] directly at [phone].  Normal or non-critical lab and imaging results will be communicated to you by MyChart, letter or phone within 4 business days after the clinic has received the results. If you do not hear from us within 7 days, please contact the clinic through MyChart or phone. If you have a critical or abnormal lab result, we will notify you by phone as soon as possible.  Submit refill requests through OSIX or call your pharmacy and they will forward the refill request to us. Please allow 3 business days for your refill to be completed.          Additional Information About Your Visit        Care EveryWhere ID     This is your Care EveryWhere ID. This could be used by other organizations to access your [place] medical records  [id number]        Your Vitals Were     Pulse Temperature Respirations Height Last Period Pulse Oximetry    65 97.2  F (36.2  C) (Oral) 12 5' 8\" (1.727 m) 01/03/2014 99%    Breastfeeding? BMI (Body Mass Index)                No 27.16 kg/m2           Blood Pressure from Last 3 Encounters:   06/18/18 122/74   05/11/18 120/78   04/06/18 122/80    Weight from Last 3 Encounters:   06/18/18 178 lb 9.6 oz (81 kg)   05/11/18 170 lb 8 oz (77.3 kg)   04/06/18 169 lb (76.7 kg)              We Performed the Following     Protein electrophoresis     Vitamin B12        Primary Care Provider Office Phone # Fax #    Zach Corona [phone] [address]  [place] [address]        Equal Access to Services     [facility] IN[facility] : Meliza Salamanca, chris gonzalez, gallo wongalnimco long, charli vasquez. So [facility] [phone].    ATENCIÓN: Si habla español, tiene a chavez disposición servicios gratuitos de asistencia " lingüísticaRajesh Ibrahim al [phone].    We comply with applicable federal civil rights laws and [place] laws. We do not discriminate on the basis of race, color, national origin, age, disability, sex, sexual orientation, or gender identity.            Thank you!     Thank you for choosing [facility] [place] [facility]\""  for your care. Our goal is always to provide you with excellent care. Hearing back from our patients is one way we can continue to improve our services. Please take a few minutes to complete the written survey that you may receive in the mail after your visit with us. Thank you!             Your Updated Medication List - Protect others around you: Learn how to safely use, store and throw away your medicines at www.disposemymeds.org.          This list is accurate as of 6/18/18 11:16 AM.  Always use your most recent med list.                   Brand Name Dispense Instructions for use Diagnosis    amphetamine-dextroamphetamine 20 MG per tablet    ADDERALL    60 tablet    Take 1 tablet (20 mg) by mouth 2 times daily    Attention deficit hyperactivity disorder (ADHD), unspecified ADHD type       aspirin 325 MG EC tablet      Take  by mouth daily.        diclofenac 75 MG EC tablet    VOLTAREN    20 tablet    Take 1 tablet (75 mg) by mouth 2 times daily as needed for moderate pain    Chest wall pain       fexofenadine 180 MG tablet    ALLEGRA    30 tablet    Take 1 tablet (180 mg) by mouth daily    Itchy eyes       gabapentin 300 MG capsule    NEURONTIN    90 capsule    Take 1 capsule (300 mg) by mouth 3 times daily    Fibromyalgia muscle pain       ibuprofen 800 MG tablet    ADVIL/MOTRIN    30 tablet    TAKE 1 TABLET(800 MG) BY MOUTH EVERY 8 HOURS AS NEEDED FOR MODERATE PAIN    Fibromyalgia       ketotifen 0.025 % Soln ophthalmic solution    ZADITOR    1 Bottle    Place 1 drop into both eyes 2 times daily    Itchy eyes       metroNIDAZOLE 0.75 % vaginal gel    METROGEL    70 g    Place 1 applicator vaginally  for 5 nights before bed, then use twice weekly for 4 months    BV (bacterial vaginosis)       nicotine 21 MG/24HR 24 hr patch    NICODERM CQ    30 patch    Place 1 patch onto the skin every 24 hours    Nicotine withdrawal       ondansetron 4 MG ODT tab    ZOFRAN-ODT    5 tablet    Take 1 tablet (4 mg) by mouth every 8 hours as needed for nausea        permethrin 5 % cream    ELIMITE    60 g    Apply to clean, dry hair and leave on overnight or for 8-14 hours before washing off with water.    Lice       polyethylene glycol powder    MIRALAX    510 g    Take 17 g (1 capful) by mouth daily    Flatulence, eructation, and gas pain       traZODone 50 MG tablet    DESYREL    30 tablet    Take 1 tablet (50 mg) by mouth At Bedtime    Insomnia, unspecified type       valACYclovir 1000 mg tablet    VALTREX    20 tablet    Take 2 tablets (2,000 mg) by mouth 2 times daily    Herpes labialis

## 2018-06-18 NOTE — PATIENT INSTRUCTIONS
[facility]    If you have any questions regarding to your visit please contact your care team:       Team Purple:   Clinic Hours Telephone Number   Dr. Milagros Briones   7am-7pm  Monday - Thursday   7am-5pm  Fridays  [phone]  (Appointment scheduling available 24/7)    Questions about your recent visit?   Team Line:  [phone]   Urgent Care - [place] and [facility] - 11am-9pm Monday-Friday Saturday-Sunday- 9am-5pm   [place] - 5pm-9pm Monday-Friday Saturday-Sunday- 9am-5pm  [phone] - [place]  [phone] - [place]       What options do I have for a visit other than an office visit? We offer electronic visits (e-visits) and telephone visits, when medically appropriate.  Please check with your medical insurance to see if these types of visits are covered, as you will be responsible for any charges that are not paid by your insurance.      You can use payByMobile (secure electronic communication) to access to your chart, send your primary care provider a message, or make an appointment. Ask a team member how to get started.     For a price quote for your services, please call our Consumer Price Line at [phone] or our Imaging Cost estimation line at [phone] (for imaging tests).    Francis Fallon

## 2018-06-18 NOTE — NURSING NOTE
"Chief Complaint   Patient presents with     Not Feeling Well     Referral     MRI      Back Pain     neck, and lower back pain     Initial /74 (BP Location: Left arm, Patient Position: Chair, Cuff Size: Adult Regular)  Pulse 65  Temp 97.2  F (36.2  C) (Oral)  Resp 12  Ht 5' 8\" (1.727 m)  Wt 178 lb 9.6 oz (81 kg)  LMP 01/03/2014  SpO2 99%  Breastfeeding? No  BMI 27.16 kg/m2 Estimated body mass index is 27.16 kg/(m^2) as calculated from the following:    Height as of this encounter: 5' 8\" (1.727 m).    Weight as of this encounter: 178 lb 9.6 oz (81 kg).  BP completed using cuff size: lali Fallon  "

## 2018-06-19 ENCOUNTER — TELEPHONE (OUTPATIENT)
Dept: FAMILY MEDICINE | Facility: CLINIC | Age: 48
End: 2018-06-19

## 2018-06-19 DIAGNOSIS — R39.9 URINARY SYMPTOM OR SIGN: Primary | ICD-10-CM

## 2018-06-19 LAB
ALBUMIN SERPL ELPH-MCNC: 4.5 G/DL (ref 3.7–5.1)
ALPHA1 GLOB SERPL ELPH-MCNC: 0.3 G/DL (ref 0.2–0.4)
ALPHA2 GLOB SERPL ELPH-MCNC: 0.6 G/DL (ref 0.5–0.9)
B-GLOBULIN SERPL ELPH-MCNC: 0.7 G/DL (ref 0.6–1)
GAMMA GLOB SERPL ELPH-MCNC: 0.7 G/DL (ref 0.7–1.6)
M PROTEIN SERPL ELPH-MCNC: 0 G/DL
PROT PATTERN SERPL ELPH-IMP: NORMAL

## 2018-06-19 NOTE — TELEPHONE ENCOUNTER
Reason for Call:  Medication or medication refill:    Do you use a [facility]?  Name of the pharmacy and phone number for the current request:    [id number] - [place], [place] - [address]   [address] [place]  [place] [address]  Phone: [phone] Fax: [phone]        Name of the medication requested: antibiotic for bladder infection    Other request: patient states that she has UTI and asking for a refill of the antibiotic that Dr. Tyler always prescribes.    Can we leave a detailed message on this number? YES    Phone number patient can be reached at: Home number on file [phone] (home)    Best Time: any time    Call taken on 6/19/2018 at 3:29 PM by Sofia Bryan

## 2018-06-19 NOTE — TELEPHONE ENCOUNTER
Pt was given this message. States she lives in [place]. Her car is broke down right now. She has been going through this for 20 years. She would like rx called in. She takes a med that causes her to have UTI's. She would like a call from Dr. Tyler. Please advise.     Clementina Hartley RN  [facility]

## 2018-06-20 ENCOUNTER — TRANSFERRED RECORDS (OUTPATIENT)
Dept: HEALTH INFORMATION MANAGEMENT | Facility: CLINIC | Age: 48
End: 2018-06-20

## 2018-06-20 RX ORDER — FLUCONAZOLE 150 MG/1
150 TABLET ORAL ONCE
Qty: 1 TABLET | Refills: 0 | Status: CANCELLED | OUTPATIENT
Start: 2018-06-20 | End: 2018-06-20

## 2018-06-20 NOTE — PROGRESS NOTES
"Marlin Lopez is a 48 year old female who is being evaluated via a telephone visit.      The patient has been notified of following (by EVA Quevedo MA       \"We have found that certain health care needs can be provided without the need for a physical exam.  This service lets us provide the care you need with a short phone conversation.  If a prescription is necessary we can send it directly to your pharmacy.  If lab work is needed we can place an order for that and you can then stop by our lab to have the test done at a later time.    This telephone visit will be conducted via 3 way call with the you (the patient) , the physician/provider, and a me all on the line at the same time.  This allows your physician/provider to have the phone conversation with you while I will be taking notes for your medical record.  We will have full access to your [facility] medical record during this entire phone call.    Since this is like an office visit,  will bill your insurance company for this service.  Please check with your medical insurance if this type of telephone/virtual is covered . You may be responsible for the cost of this service if insurance coverage is denied.  The typical cost is $30 (10min), $59(11-20min) and $85 (21-30min)     If during the course of the call the physician/provider feels a telephone visit is not appropriate, you will not be charged for this service\"    Consent has been obtained for this service by care team member: yes.  See the scanned image in the medical record.  10:58 AM-start Time  11:08 AM End time      ===================================================    SUBJECTIVE:   Marlin Lopez is a 48 year old female who presents to clinic today for the following health issues:      Vaginal Symptoms  Onset: pt has recurrent BV  Pt has urinary Urgency  Pt has no dysuria  No fever or chills  Pt has no Vaginal discharge  Therapies Tried and outcome:   Pt says she knows she has a " Bladder Infection  Lives in [place] and unable to come in  No fever or chills  No abdominal pain      Problem list and histories reviewed & adjusted, as indicated.  Additional history: as documented    Patient Active Problem List   Diagnosis     Anxiety state     Fibromyalgia     Sleep problems     Recurrent cold sores     Moderate major depression (H)     ADHD (attention deficit hyperactivity disorder)     CARDIOVASCULAR SCREENING; LDL GOAL LESS THAN 130     Family history of colon cancer     Allergic rhinitis     Renal cyst, left     Chronic constipation     Diverticulosis of large intestine without hemorrhage     Ulceration of colon determined by endoscopy     Past Surgical History:   Procedure Laterality Date     C REMOVAL OF OVARY(S)  1992    Left, cyst     TONSILLECTOMY  1979       Social History   Substance Use Topics     Smoking status: Former Smoker     Packs/day: 0.50     Types: Cigarettes     Start date: 1/1/1980     Quit date: 9/20/2015     Smokeless tobacco: Never Used      Comment:  using e-cig daily     Alcohol use 0.0 oz/week     0 Standard drinks or equivalent per week      Comment:  drinks 3 days in a week, 2 beers each time      Family History   Problem Relation Age of Onset     Hypertension Maternal Grandmother      Cerebrovascular Disease Maternal Grandmother      Alzheimer Disease Maternal Grandmother      Arthritis Maternal Grandmother      Obesity Maternal Grandmother      Cancer Other      Colon     Breast Cancer Other      Cancer - colorectal Paternal Grandfather          Current Outpatient Prescriptions   Medication Sig Dispense Refill     amphetamine-dextroamphetamine (ADDERALL) 20 MG per tablet Take 1 tablet (20 mg) by mouth 2 times daily 60 tablet 0     aspirin 325 MG EC tablet Take  by mouth daily.       diclofenac (VOLTAREN) 75 MG EC tablet Take 1 tablet (75 mg) by mouth 2 times daily as needed for moderate pain 20 tablet 0     fexofenadine (ALLEGRA) 180 MG tablet Take 1 tablet (180  mg) by mouth daily 30 tablet 1     gabapentin (NEURONTIN) 300 MG capsule Take 1 capsule (300 mg) by mouth 3 times daily 90 capsule 2     hydrOXYzine (VISTARIL) 50 MG capsule Take 1 capsule (50 mg) by mouth 3 times daily as needed for itching 30 capsule 0     ibuprofen (ADVIL/MOTRIN) 800 MG tablet TAKE 1 TABLET(800 MG) BY MOUTH EVERY 8 HOURS AS NEEDED FOR MODERATE PAIN 30 tablet 5     ketotifen (ZADITOR) 0.025 % SOLN ophthalmic solution Place 1 drop into both eyes 2 times daily 1 Bottle 1     metroNIDAZOLE (METROGEL) 0.75 % vaginal gel Place 1 applicator vaginally for 5 nights before bed, then use twice weekly for 4 months 70 g 5     nicotine (NICODERM CQ) 21 MG/24HR 24 hr patch Place 1 patch onto the skin every 24 hours 30 patch 4     ondansetron (ZOFRAN-ODT) 4 MG ODT tab Take 1 tablet (4 mg) by mouth every 8 hours as needed for nausea 5 tablet 0     permethrin (ELIMITE) 5 % cream Apply to clean, dry hair and leave on overnight or for 8-14 hours before washing off with water. 60 g 1     polyethylene glycol (MIRALAX) powder Take 17 g (1 capful) by mouth daily 510 g 1     sulfamethoxazole-trimethoprim (BACTRIM DS/SEPTRA DS) 800-160 MG per tablet Take 1 tablet by mouth 2 times daily for 3 days 6 tablet 0     traZODone (DESYREL) 50 MG tablet Take 1 tablet (50 mg) by mouth At Bedtime 30 tablet 2     valACYclovir (VALTREX) 1000 mg tablet Take 2 tablets (2,000 mg) by mouth 2 times daily 20 tablet 0     Allergies   Allergen Reactions     Cats Itching     Eyes, Sneezy,     Codeine Nausea and Vomiting     Recent Labs   Lab Test  02/05/18   1040  12/22/16   1258  06/29/16   12/29/15   1453  08/04/15   1324   LDL   --    --    --    --    --   103*   --    HDL   --    --    --    --    --   78   --    TRIG   --    --    --    --    --   29   --    ALT  18  17   --   30   < >   --    --    CR  0.64  0.76   < >  0.64   --    --    --    GFRESTIMATED  >90  81   < >  >60   --    --    --    GFRESTBLACK  >90  >90    GFR Calc     --   >60   < >   --    --    POTASSIUM  3.7  3.8   < >  5.0   --    --    --    TSH   --    --    --   1.28   --    --   0.85    < > = values in this interval not displayed.      BP Readings from Last 3 Encounters:   06/18/18 122/74   05/11/18 120/78   04/06/18 122/80    Wt Readings from Last 3 Encounters:   06/18/18 178 lb 9.6 oz (81 kg)   05/11/18 170 lb 8 oz (77.3 kg)   04/06/18 169 lb (76.7 kg)                  Labs reviewed in EPIC    Reviewed and updated as needed this visit by clinical staff  Allergies  Meds       Reviewed and updated as needed this visit by Provider         ROS:  CONSTITUTIONAL: NEGATIVE for fever, chills, change in weight  RESP:NEGATIVE for significant cough or SOB  CV: NEGATIVE for chest pain, palpitations or peripheral edema  GI: NEGATIVE for nausea, abdominal pain, heartburn, or change in bowel habits  :  as above    OBJECTIVE:     [facility] 01/03/2014  There is no height or weight on file to calculate BMI.  Telephone visit    Diagnostic Test Results:  none   Pt unable to go to lab for a Urine sample    ASSESSMENT/PLAN:         1. Urinary urgency  SEE [facility] care orders  Treated empirically  Advised side effects  - sulfamethoxazole-trimethoprim (BACTRIM DS/SEPTRA DS) 800-160 MG per tablet; Take 1 tablet by mouth 2 times daily for 3 days  Dispense: 6 tablet; Refill: 0  If not better or any fever or chills she needs to follow up  And see MD  Advised drink lots of fluids  Myriam Tyler MD  [facility]

## 2018-06-20 NOTE — TELEPHONE ENCOUNTER
Spoke to patient let her know that Dr Tyler wanted her her to schedule a telephone visit to talk about medication.   Schedule patient on 6/20/18 at 1:40pm    Rehana Quevedo MA

## 2018-06-21 ENCOUNTER — VIRTUAL VISIT (OUTPATIENT)
Dept: FAMILY MEDICINE | Facility: CLINIC | Age: 48
End: 2018-06-21
Payer: COMMERCIAL

## 2018-06-21 ENCOUNTER — TRANSFERRED RECORDS (OUTPATIENT)
Dept: HEALTH INFORMATION MANAGEMENT | Facility: CLINIC | Age: 48
End: 2018-06-21

## 2018-06-21 DIAGNOSIS — R39.15 URINARY URGENCY: Primary | ICD-10-CM

## 2018-06-21 PROCEDURE — 99441 ZZC PHYSICIAN TELEPHONE EVALUATION 5-10 MIN: CPT | Performed by: FAMILY MEDICINE

## 2018-06-21 RX ORDER — SULFAMETHOXAZOLE/TRIMETHOPRIM 800-160 MG
1 TABLET ORAL 2 TIMES DAILY
Qty: 6 TABLET | Refills: 0 | Status: SHIPPED | OUTPATIENT
Start: 2018-06-21 | End: 2018-06-24

## 2018-06-21 NOTE — MR AVS SNAPSHOT
After Visit Summary   6/21/2018    Marlin Lopez    MRN: [id number]           Patient Information     Date Of Birth          1970        Visit Information        Provider Department      6/21/2018 12:20 PM Myriam Tyler MD [facility]        Today's Diagnoses     Urinary urgency    -  1       Follow-ups after your visit        Your next 10 appointments already scheduled     Jun 21, 2018 12:20 PM CDT   (Arrive by 11:00 AM)   Telephone Visit with Myriam Tyler MD   [facility] ([facility])    [address] [facility]  [place] [address]   [phone]           Note: this is not an onsite visit; there is no need to come to the facility.              Who to contact     If you have questions or need follow up information about today's clinic visit or your schedule please contact [facility] directly at [phone].  Normal or non-critical lab and imaging results will be communicated to you by MyChart, letter or phone within 4 business days after the clinic has received the results. If you do not hear from us within 7 days, please contact the clinic through MyChart or phone. If you have a critical or abnormal lab result, we will notify you by phone as soon as possible.  Submit refill requests through [facility] or call your pharmacy and they will forward the refill request to us. Please allow 3 business days for your refill to be completed.          Additional Information About Your Visit        Care EveryWhere ID     This is your Care EveryWhere ID. This could be used by other organizations to access your [facility] medical records  [id number]        Your Vitals Were     Last Period                   01/03/2014            Blood Pressure from Last 3 Encounters:   06/18/18 122/74   05/11/18 120/78   04/06/18 122/80    Weight from Last 3 Encounters:   06/18/18 178 lb 9.6 oz (81 kg)   05/11/18 170 lb 8 oz (77.3 kg)   04/06/18 169 lb (76.7 kg)               Today, you had the following     No orders found for display         Today's Medication Changes          These changes are accurate as of 6/21/18 11:11 AM.  If you have any questions, ask your nurse or doctor.               Start taking these medicines.        Dose/Directions    sulfamethoxazole-trimethoprim 800-160 MG per tablet   Commonly known as:  BACTRIM DS/SEPTRA DS   Used for:  Urinary urgency        Dose:  1 tablet   Take 1 tablet by mouth 2 times daily for 3 days   Quantity:  6 tablet   Refills:  0            Where to get your medicines      These medications were sent to [facility] [address]  [place] - [address]   [address] [place] [address]     Phone:  [phone]     sulfamethoxazole-trimethoprim 800-160 MG per tablet                Primary Care Provider Office Phone # Fax #    Zach Corona [phone] [address]  [facility] [address]        Equal Access to Services     [facility] AH: Hadii aad ku hadasho Soomaali, waaxda luqadaha, qaybta kaalmada adeegyada, waxay idiin haychantelln dipti locke . So [facility] [phone].    ATENCIÓN: Si habla español, tiene a chavez disposición servicios gratuitos de asistencia lingüística. [facility] [phone].    We comply with applicable federal civil rights laws and [place] laws. We do not discriminate on the basis of race, color, national origin, age, disability, sex, sexual orientation, or gender identity.            Thank you!     Thank you for choosing [facility]  for your care. Our goal is always to provide you with excellent care. Hearing back from our patients is one way we can continue to improve our services. Please take a few minutes to complete the written survey that you may receive in the mail after your visit with us. Thank you!             Your Updated Medication List - Protect others around you: Learn how to safely use, store and throw  away your medicines at www.disposemymeds.org.          This list is accurate as of 6/21/18 11:11 AM.  Always use your most recent med list.                   Brand Name Dispense Instructions for use Diagnosis    amphetamine-dextroamphetamine 20 MG per tablet    ADDERALL    60 tablet    Take 1 tablet (20 mg) by mouth 2 times daily    Attention deficit hyperactivity disorder (ADHD), unspecified ADHD type       aspirin 325 MG EC tablet      Take  by mouth daily.        diclofenac 75 MG EC tablet    VOLTAREN    20 tablet    Take 1 tablet (75 mg) by mouth 2 times daily as needed for moderate pain    Chest wall pain       fexofenadine 180 MG tablet    ALLEGRA    30 tablet    Take 1 tablet (180 mg) by mouth daily    Itchy eyes       gabapentin 300 MG capsule    NEURONTIN    90 capsule    Take 1 capsule (300 mg) by mouth 3 times daily    Fibromyalgia muscle pain       hydrOXYzine 50 MG capsule    VISTARIL    30 capsule    Take 1 capsule (50 mg) by mouth 3 times daily as needed for itching    Scalp itch       ibuprofen 800 MG tablet    ADVIL/MOTRIN    30 tablet    TAKE 1 TABLET(800 MG) BY MOUTH EVERY 8 HOURS AS NEEDED FOR MODERATE PAIN    Fibromyalgia       ketotifen 0.025 % Soln ophthalmic solution    ZADITOR    1 Bottle    Place 1 drop into both eyes 2 times daily    Itchy eyes       metroNIDAZOLE 0.75 % vaginal gel    METROGEL    70 g    Place 1 applicator vaginally for 5 nights before bed, then use twice weekly for 4 months    BV (bacterial vaginosis)       nicotine 21 MG/24HR 24 hr patch    NICODERM CQ    30 patch    Place 1 patch onto the skin every 24 hours    Nicotine withdrawal       ondansetron 4 MG ODT tab    ZOFRAN-ODT    5 tablet    Take 1 tablet (4 mg) by mouth every 8 hours as needed for nausea        permethrin 5 % cream    ELIMITE    60 g    Apply to clean, dry hair and leave on overnight or for 8-14 hours before washing off with water.    Lice       polyethylene glycol powder    MIRALAX    510 g    Take 17  g (1 capful) by mouth daily    Flatulence, eructation, and gas pain       sulfamethoxazole-trimethoprim 800-160 MG per tablet    BACTRIM DS/SEPTRA DS    6 tablet    Take 1 tablet by mouth 2 times daily for 3 days    Urinary urgency       traZODone 50 MG tablet    DESYREL    30 tablet    Take 1 tablet (50 mg) by mouth At Bedtime    Insomnia, unspecified type       valACYclovir 1000 mg tablet    VALTREX    20 tablet    Take 2 tablets (2,000 mg) by mouth 2 times daily    Herpes labialis

## 2018-06-25 ENCOUNTER — TRANSFERRED RECORDS (OUTPATIENT)
Dept: HEALTH INFORMATION MANAGEMENT | Facility: CLINIC | Age: 48
End: 2018-06-25

## 2018-06-27 ENCOUNTER — TELEPHONE (OUTPATIENT)
Dept: FAMILY MEDICINE | Facility: CLINIC | Age: 48
End: 2018-06-27

## 2018-06-27 NOTE — TELEPHONE ENCOUNTER
Called patient and left a message:  Recent MRI of the brain neck and upper back are unchanged from previous imaging but I think continuing to follow with the pain clinic of the spine specialist to get to the recommendations will be ideal.  Also got the report from the endoscopy biopsy done which showed just irritation in the stomach otherwise no other abnormalities.  Call back with any questions

## 2018-06-27 NOTE — TELEPHONE ENCOUNTER
Patient notified of Provider's message as written.  Patient verbalized understanding.  Justyn Hayes RN

## 2018-07-31 ENCOUNTER — OFFICE VISIT (OUTPATIENT)
Dept: FAMILY MEDICINE | Facility: CLINIC | Age: 48
End: 2018-07-31
Payer: COMMERCIAL

## 2018-07-31 VITALS
TEMPERATURE: 97.3 F | WEIGHT: 165 LBS | HEIGHT: 68 IN | BODY MASS INDEX: 25.01 KG/M2 | SYSTOLIC BLOOD PRESSURE: 132 MMHG | HEART RATE: 70 BPM | DIASTOLIC BLOOD PRESSURE: 78 MMHG | OXYGEN SATURATION: 98 % | RESPIRATION RATE: 19 BRPM

## 2018-07-31 DIAGNOSIS — L29.9 SCALP ITCH: ICD-10-CM

## 2018-07-31 DIAGNOSIS — R30.0 DYSURIA: Primary | ICD-10-CM

## 2018-07-31 DIAGNOSIS — H57.9 ITCHY EYES: ICD-10-CM

## 2018-07-31 DIAGNOSIS — G47.00 INSOMNIA, UNSPECIFIED TYPE: ICD-10-CM

## 2018-07-31 DIAGNOSIS — N89.8 VAGINAL DISCHARGE: ICD-10-CM

## 2018-07-31 LAB
ALBUMIN UR-MCNC: NEGATIVE MG/DL
APPEARANCE UR: CLEAR
BILIRUB UR QL STRIP: NEGATIVE
COLOR UR AUTO: YELLOW
GLUCOSE UR STRIP-MCNC: NEGATIVE MG/DL
HGB UR QL STRIP: ABNORMAL
KETONES UR STRIP-MCNC: NEGATIVE MG/DL
LEUKOCYTE ESTERASE UR QL STRIP: ABNORMAL
NITRATE UR QL: NEGATIVE
NON-SQ EPI CELLS #/AREA URNS LPF: ABNORMAL /LPF
PH UR STRIP: 6.5 PH (ref 5–7)
RBC #/AREA URNS AUTO: ABNORMAL /HPF
SOURCE: ABNORMAL
SP GR UR STRIP: 1.01 (ref 1–1.03)
SPECIMEN SOURCE: NORMAL
UROBILINOGEN UR STRIP-ACNC: 0.2 EU/DL (ref 0.2–1)
WBC #/AREA URNS AUTO: ABNORMAL /HPF
WET PREP SPEC: NORMAL

## 2018-07-31 PROCEDURE — 87210 SMEAR WET MOUNT SALINE/INK: CPT | Performed by: FAMILY MEDICINE

## 2018-07-31 PROCEDURE — 99214 OFFICE O/P EST MOD 30 MIN: CPT | Performed by: FAMILY MEDICINE

## 2018-07-31 PROCEDURE — 87086 URINE CULTURE/COLONY COUNT: CPT | Performed by: FAMILY MEDICINE

## 2018-07-31 PROCEDURE — 81001 URINALYSIS AUTO W/SCOPE: CPT | Performed by: FAMILY MEDICINE

## 2018-07-31 PROCEDURE — 87491 CHLMYD TRACH DNA AMP PROBE: CPT | Performed by: FAMILY MEDICINE

## 2018-07-31 PROCEDURE — 87591 N.GONORRHOEAE DNA AMP PROB: CPT | Performed by: FAMILY MEDICINE

## 2018-07-31 RX ORDER — HYDROXYZINE PAMOATE 50 MG/1
50 CAPSULE ORAL 3 TIMES DAILY PRN
Qty: 30 CAPSULE | Refills: 0 | Status: CANCELLED | OUTPATIENT
Start: 2018-07-31

## 2018-07-31 RX ORDER — POLYETHYLENE GLYCOL 3350 17 G/17G
1 POWDER, FOR SOLUTION ORAL DAILY
Qty: 510 G | Refills: 1 | Status: CANCELLED | OUTPATIENT
Start: 2018-07-31

## 2018-07-31 RX ORDER — PERMETHRIN 50 MG/G
CREAM TOPICAL
Qty: 60 G | Refills: 1 | Status: CANCELLED | OUTPATIENT
Start: 2018-07-31

## 2018-07-31 NOTE — PATIENT INSTRUCTIONS
[facility]    If you have any questions regarding to your visit please contact your care team:       Team Red:   Clinic Hours Telephone Number   Dr. Estephania Murray, NP   7am-7pm  Monday - Thursday   7am-5pm  Fridays  [phone]  (Appointment scheduling available 24/7)    Questions about your recent visit?   Team Line  [phone]   Urgent Care - [place] and [facility] - 11am-9pm Monday-Friday Saturday-Sunday- 9am-5pm   [place] - 5pm-9pm Monday-Friday Saturday-Sunday- 9am-5pm  [phone] - [place]  [phone] - [place]       What options do I have for a visit other than an office visit? We offer electronic visits (e-visits) and telephone visits, when medically appropriate.  Please check with your medical insurance to see if these types of visits are covered, as you will be responsible for any charges that are not paid by your insurance.      You can use [employer] (secure electronic communication) to access to your chart, send your primary care provider a message, or make an appointment. Ask a team member how to get started.     For a price quote for your services, please call our Consumer Price Line at [phone] or our Imaging Cost estimation line at [phone] (for imaging tests).

## 2018-07-31 NOTE — PROGRESS NOTES
SUBJECTIVE:   Marlin Lopez is a 48 year old female who presents to clinic today for the following health issues:      URINARY TRACT SYMPTOMS  Onset: Patient states they have been going on and off for a few months     Description:   Painful urination (Dysuria): no   Blood in urine (Hematuria): no  Pt has urgency    Intensity: severe    Progression of Symptoms:  worsening    Accompanying Signs & Symptoms:  Fever/chills: YES- Patient is unsure she states she is hot and cold all day long   Flank pain  o  Nausea and vomiting: no   Any vaginal symptoms: vaginal discharge  Abdominal/Pelvic Pain: YES-mild suprapubic    History:   History of frequent UTI's: no   History of kidney stones: no   Sexually Active: YES  Possibility of pregnancy: No    Precipitating factors:   None    Therapies Tried and outcome: Azo  Pt says she has a vaginal Discharge  White  No odor  She is postmenopausal  Pt also has a Lot of scalp Itching  She feels something is crawling on her scalp  She says she saw Dermatologist and They did not find anything  She does color her hair and uses hair sprays      Problem list and histories reviewed & adjusted, as indicated.  Additional history: as documented    Patient Active Problem List   Diagnosis     Anxiety state     Fibromyalgia     Sleep problems     Recurrent cold sores     Moderate major depression (H)     ADHD (attention deficit hyperactivity disorder)     CARDIOVASCULAR SCREENING; LDL GOAL LESS THAN 130     Family history of colon cancer     Allergic rhinitis     Renal cyst, left     Chronic constipation     Diverticulosis of large intestine without hemorrhage     Ulceration of colon determined by endoscopy     Past Surgical History:   Procedure Laterality Date     C REMOVAL OF OVARY(S)  1992    Left, cyst     TONSILLECTOMY  1979       Social History   Substance Use Topics     Smoking status: Former Smoker     Packs/day: 0.50     Types: Cigarettes     Start date: 1/1/1980     Quit date:  9/20/2015     Smokeless tobacco: Never Used      Comment:  using e-cig daily     Alcohol use 0.0 oz/week     0 Standard drinks or equivalent per week      Comment:  drinks 3 days in a week, 2 beers each time      Family History   Problem Relation Age of Onset     Hypertension Maternal Grandmother      Cerebrovascular Disease Maternal Grandmother      Alzheimer Disease Maternal Grandmother      Arthritis Maternal Grandmother      Obesity Maternal Grandmother      Cancer Other      Colon     Breast Cancer Other      Cancer - colorectal Paternal Grandfather          Current Outpatient Prescriptions   Medication Sig Dispense Refill     amphetamine-dextroamphetamine (ADDERALL) 20 MG per tablet Take 1 tablet (20 mg) by mouth 2 times daily 60 tablet 0     aspirin 325 MG EC tablet Take  by mouth daily.       diclofenac (VOLTAREN) 75 MG EC tablet Take 1 tablet (75 mg) by mouth 2 times daily as needed for moderate pain 20 tablet 0     gabapentin (NEURONTIN) 300 MG capsule Take 1 capsule (300 mg) by mouth 3 times daily 90 capsule 2     ibuprofen (ADVIL/MOTRIN) 800 MG tablet TAKE 1 TABLET(800 MG) BY MOUTH EVERY 8 HOURS AS NEEDED FOR MODERATE PAIN 30 tablet 5     ketotifen (ZADITOR) 0.025 % SOLN ophthalmic solution Place 1 drop into both eyes 2 times daily 1 Bottle 1     metroNIDAZOLE (METROGEL) 0.75 % vaginal gel Place 1 applicator vaginally for 5 nights before bed, then use twice weekly for 4 months 70 g 5     nicotine (NICODERM CQ) 21 MG/24HR 24 hr patch Place 1 patch onto the skin every 24 hours 30 patch 4     polyethylene glycol (MIRALAX) powder Take 17 g (1 capful) by mouth daily 510 g 1     traZODone (DESYREL) 50 MG tablet TAKE 1 TABLET(50 MG) BY MOUTH AT BEDTIME 90 tablet 1     valACYclovir (VALTREX) 1000 mg tablet Take 2 tablets (2,000 mg) by mouth 2 times daily 20 tablet 0     fexofenadine (ALLEGRA) 180 MG tablet Take 1 tablet (180 mg) by mouth daily (Patient not taking: Reported on 7/31/2018) 30 tablet 1      hydrOXYzine (VISTARIL) 50 MG capsule Take 1 capsule (50 mg) by mouth 3 times daily as needed for itching (Patient not taking: Reported on 7/31/2018) 30 capsule 0     ondansetron (ZOFRAN-ODT) 4 MG ODT tab Take 1 tablet (4 mg) by mouth every 8 hours as needed for nausea (Patient not taking: Reported on 7/31/2018) 5 tablet 0     [DISCONTINUED] ketotifen (ZADITOR) 0.025 % SOLN ophthalmic solution Place 1 drop into both eyes 2 times daily 1 Bottle 1     Allergies   Allergen Reactions     Cats Itching     Eyes, Sneezy,     Codeine Nausea and Vomiting     Recent Labs   Lab Test  02/05/18   1040  12/22/16   1258  06/29/16   12/29/15   1453  08/04/15   1324   LDL   --    --    --    --    --   103*   --    HDL   --    --    --    --    --   78   --    TRIG   --    --    --    --    --   29   --    ALT  18  17   --   30   < >   --    --    CR  0.64  0.76   < >  0.64   --    --    --    GFRESTIMATED  >90  81   < >  >60   --    --    --    GFRESTBLACK  >90  >90  African American GFR Calc     --   >60   < >   --    --    POTASSIUM  3.7  3.8   < >  5.0   --    --    --    TSH   --    --    --   1.28   --    --   0.85    < > = values in this interval not displayed.      BP Readings from Last 3 Encounters:   07/31/18 132/78   06/18/18 122/74   05/11/18 120/78    Wt Readings from Last 3 Encounters:   07/31/18 165 lb (74.8 kg)   06/18/18 178 lb 9.6 oz (81 kg)   05/11/18 170 lb 8 oz (77.3 kg)                  Labs reviewed in EPIC    Reviewed and updated as needed this visit by clinical staff       Reviewed and updated as needed this visit by Provider         ROS:  CONSTITUTIONAL: NEGATIVE for fever, chills, change in weight  ENT/MOUTH: NEGATIVE for ear, mouth and throat problems  RESP: NEGATIVE for significant cough or SOB  CV: NEGATIVE for chest pain, palpitations or peripheral edema  GI: NEGATIVE for nausea, abdominal pain, heartburn, or change in bowel habits  : as above  PSYCHIATRIC: anxiety    OBJECTIVE:     /78  Pulse  "70  Temp 97.3  F (36.3  C) (Oral)  Resp 19  Ht 5' 8\" (1.727 m)  Wt 165 lb (74.8 kg)  LMP 01/03/2014  SpO2 98%  BMI 25.09 kg/m2  Body mass index is 25.09 kg/(m^2).  GENERAL: healthy, alert and no distress  ABDOMEN: soft, nontender, no hepatosplenomegaly, no masses and bowel sounds normal   (female): normal female external genitalia, normal urethral meatus, vaginal mucosa, normal cervix/adnexa/uterus without masses or discharge  MS: no gross musculoskeletal defects noted, no edema  Scalp Looks normal   Hair normal     Diagnostic Test Results:  Results for orders placed or performed in visit on 07/31/18 (from the past 24 hour(s))   *UA reflex to Microscopic and Culture ([facility] (except [place] and [place])   Result Value Ref Range    Color Urine Yellow     Appearance Urine Clear     Glucose Urine Negative NEG^Negative mg/dL    Bilirubin Urine Negative NEG^Negative    Ketones Urine Negative NEG^Negative mg/dL    Specific Gravity Urine 1.010 1.003 - 1.035    Blood Urine Trace (A) NEG^Negative    pH Urine 6.5 5.0 - 7.0 pH    Protein Albumin Urine Negative NEG^Negative mg/dL    Urobilinogen Urine 0.2 0.2 - 1.0 EU/dL    Nitrite Urine Negative NEG^Negative    Leukocyte Esterase Urine Small (A) NEG^Negative    Source Midstream Urine    Urine Microscopic   Result Value Ref Range    WBC Urine 0 - 5 OTO5^0 - 5 /HPF    RBC Urine 2-5 (A) OTO2^O - 2 /HPF    Squamous Epithelial /LPF Urine Few FEW^Few /LPF   Wet prep   Result Value Ref Range    Specimen Description Vagina     Wet Prep No Trichomonas seen     Wet Prep No yeast seen     Wet Prep No clue cells seen        ASSESSMENT/PLAN:     1. Dysuria  Pt has small amount of blood in Urine  She can follow up with Urology  - *UA reflex to Microscopic and Culture ([place] [facility] (except [place] and [place])  - Urine Microscopic  - Urine Culture Aerobic Bacterial  - UROLOGY ADULT REFERRAL    2. Scalp itch  Discussed I do not see anything   " advised Not to use hair sprays  Or dyes  And see if better    3. Vaginal discharge  Follow up if not better  - Wet prep  - NEISSERIA GONORRHOEA PCR  - CHLAMYDIA TRACHOMATIS PCR    4. Itchy eyes  refilled  - ketotifen (ZADITOR) 0.025 % SOLN ophthalmic solution; Place 1 drop into both eyes 2 times daily  Dispense: 1 Bottle; Refill: 1    5. Insomnia, unspecified type  Pt is on Trazodone and doing well    Follow up as needed   Myriam Tyler MD  [facility]

## 2018-07-31 NOTE — MR AVS SNAPSHOT
After Visit Summary   7/31/2018    Marlin Lopez    MRN: [id number]           Patient Information     Date Of Birth          1970        Visit Information        Provider Department      7/31/2018 10:10 AM Myriam Tyler MD [facility]        Today's Diagnoses     Dysuria    -  1    Scalp itch        Vaginal discharge        Itchy eyes        Insomnia, unspecified type          Care Instructions      [facility]    If you have any questions regarding to your visit please contact your care team:       Team Red:   Clinic Hours Telephone Number   Dr. Estephania Murray, NP   7am-7pm  Monday - Thursday   7am-5pm  Fridays  [phone]  (Appointment scheduling available 24/7)    Questions about your recent visit?   Team Line  [phone]   Urgent Care - [place] and [facility] - 11am-9pm Monday-Friday Saturday-Sunday- 9am-5pm   [place] - 5pm-9pm Monday-Friday Saturday-Sunday- 9am-5pm  [phone] - [place]  [phone] - [place]       What options do I have for a visit other than an office visit? We offer electronic visits (e-visits) and telephone visits, when medically appropriate.  Please check with your medical insurance to see if these types of visits are covered, as you will be responsible for any charges that are not paid by your insurance.      You can use [employer] (secure electronic communication) to access to your chart, send your primary care provider a message, or make an appointment. Ask a team member how to get started.     For a price quote for your services, please call our Consumer Price Line at [phone] or our Imaging Cost estimation line at [phone] (for imaging tests).            Follow-ups after your visit        Additional Services     UROLOGY ADULT REFERRAL       Your provider has referred you to: FMG: [facility] [phone]    [email or web address]    Please be aware that coverage of these services is subject to the terms and limitations of your health insurance plan.  Call member services at your health plan with any benefit or coverage questions.      Please bring the following to your appointment:    >>   Any x-rays, CTs or MRIs which have been performed.  Contact the facility where they were done to arrange for  prior to your scheduled appointment.  Any new CT, MRI or other procedures ordered by your specialist must be performed at a [place] facility or coordinated by your clinic's referral office.    >>   List of current medications   >>   This referral request   >>   Any documents/labs given to you for this referral                  Who to contact     If you have questions or need follow up information about today's clinic visit or your schedule please contact [facility] directly at [phone].  Normal or non-critical lab and imaging results will be communicated to you by MyChart, letter or phone within 4 business days after the clinic has received the results. If you do not hear from us within 7 days, please contact the clinic through MyChart or phone. If you have a critical or abnormal lab result, we will notify you by phone as soon as possible.  Submit refill requests through [employer] or call your pharmacy and they will forward the refill request to us. Please allow 3 business days for your refill to be completed.          Additional Information About Your Visit        Care EveryWhere ID     This is your Care EveryWhere ID. This could be used by other organizations to access your [place] medical records  [id number]        Your Vitals Were     Pulse Temperature Respirations Height Last Period Pulse Oximetry    70 97.3  F (36.3  C) (Oral) 19 5' 8\" (1.727 m) 01/03/2014 98%    BMI (Body Mass Index)                   25.09 kg/m2            Blood Pressure from Last 3 " Encounters:   07/31/18 132/78   06/18/18 122/74   05/11/18 120/78    Weight from Last 3 Encounters:   07/31/18 165 lb (74.8 kg)   06/18/18 178 lb 9.6 oz (81 kg)   05/11/18 170 lb 8 oz (77.3 kg)              We Performed the Following     *UA reflex to Microscopic and Culture ([place] and [facility] (except [place] and [place])     CHLAMYDIA TRACHOMATIS PCR     NEISSERIA GONORRHOEA PCR     Urine Culture Aerobic Bacterial     Urine Microscopic     UROLOGY ADULT REFERRAL     Wet prep          Today's Medication Changes          These changes are accurate as of 7/31/18  1:40 PM.  If you have any questions, ask your nurse or doctor.               Stop taking these medicines if you haven't already. Please contact your care team if you have questions.     permethrin 5 % cream   Commonly known as:  ELIMITE   Stopped by:  Myriam Tyler MD                Where to get your medicines      These medications were sent to [facility] [id number]  [employer] [address] [facility] [place] [address]   [address] [place] [address]     Phone:  [phone]     ketotifen 0.025 % Soln ophthalmic solution                Primary Care Provider Office Phone # Fax #    Zach Corona [phone]       [address] [facility]  [facility] [address]        Equal Access to Services     [facility]: Hadii aad ku hadasho Soomaali, waaxda luqadaha, qaybta kaalmada adepierreda, charli locke . So [facility] [phone].    ATENCIÓN: Si habla español, tiene a chavez disposición servicios gratuitos de asistencia lingüística. Llame al [phone].    We comply with applicable federal civil rights laws and [place] laws. We do not discriminate on the basis of race, color, national origin, age, disability, sex, sexual orientation, or gender identity.            Thank you!     Thank you for choosing [facility]  for your care. Our goal is always to provide you with excellent  care. Hearing back from our patients is one way we can continue to improve our services. Please take a few minutes to complete the written survey that you may receive in the mail after your visit with us. Thank you!             Your Updated Medication List - Protect others around you: Learn how to safely use, store and throw away your medicines at www.disposemymeds.org.          This list is accurate as of 7/31/18  1:40 PM.  Always use your most recent med list.                   Brand Name Dispense Instructions for use Diagnosis    amphetamine-dextroamphetamine 20 MG per tablet    ADDERALL    60 tablet    Take 1 tablet (20 mg) by mouth 2 times daily    Attention deficit hyperactivity disorder (ADHD), unspecified ADHD type       aspirin 325 MG EC tablet      Take  by mouth daily.        diclofenac 75 MG EC tablet    VOLTAREN    20 tablet    Take 1 tablet (75 mg) by mouth 2 times daily as needed for moderate pain    Chest wall pain       fexofenadine 180 MG tablet    ALLEGRA    30 tablet    Take 1 tablet (180 mg) by mouth daily    Itchy eyes       gabapentin 300 MG capsule    NEURONTIN    90 capsule    Take 1 capsule (300 mg) by mouth 3 times daily    Fibromyalgia muscle pain       hydrOXYzine 50 MG capsule    VISTARIL    30 capsule    Take 1 capsule (50 mg) by mouth 3 times daily as needed for itching    Scalp itch       ibuprofen 800 MG tablet    ADVIL/MOTRIN    30 tablet    TAKE 1 TABLET(800 MG) BY MOUTH EVERY 8 HOURS AS NEEDED FOR MODERATE PAIN    Fibromyalgia       ketotifen 0.025 % Soln ophthalmic solution    ZADITOR    1 Bottle    Place 1 drop into both eyes 2 times daily    Itchy eyes       metroNIDAZOLE 0.75 % vaginal gel    METROGEL    70 g    Place 1 applicator vaginally for 5 nights before bed, then use twice weekly for 4 months    BV (bacterial vaginosis)       nicotine 21 MG/24HR 24 hr patch    NICODERM CQ    30 patch    Place 1 patch onto the skin every 24 hours    Nicotine withdrawal       ondansetron  4 MG ODT tab    ZOFRAN-ODT    5 tablet    Take 1 tablet (4 mg) by mouth every 8 hours as needed for nausea        polyethylene glycol powder    MIRALAX    510 g    Take 17 g (1 capful) by mouth daily    Flatulence, eructation, and gas pain       traZODone 50 MG tablet    DESYREL    90 tablet    TAKE 1 TABLET(50 MG) BY MOUTH AT BEDTIME    Insomnia, unspecified type       valACYclovir 1000 mg tablet    VALTREX    20 tablet    Take 2 tablets (2,000 mg) by mouth 2 times daily    Herpes labialis

## 2018-08-01 LAB
BACTERIA SPEC CULT: NO GROWTH
C TRACH DNA SPEC QL NAA+PROBE: NEGATIVE
N GONORRHOEA DNA SPEC QL NAA+PROBE: NEGATIVE
SPECIMEN SOURCE: NORMAL

## 2018-08-08 ENCOUNTER — TRANSFERRED RECORDS (OUTPATIENT)
Dept: HEALTH INFORMATION MANAGEMENT | Facility: CLINIC | Age: 48
End: 2018-08-08

## 2018-09-10 ENCOUNTER — TRANSFERRED RECORDS (OUTPATIENT)
Dept: HEALTH INFORMATION MANAGEMENT | Facility: CLINIC | Age: 48
End: 2018-09-10

## 2018-09-26 ENCOUNTER — OFFICE VISIT (OUTPATIENT)
Dept: FAMILY MEDICINE | Facility: CLINIC | Age: 48
End: 2018-09-26
Payer: COMMERCIAL

## 2018-09-26 VITALS
RESPIRATION RATE: 18 BRPM | HEIGHT: 68 IN | WEIGHT: 170.5 LBS | BODY MASS INDEX: 25.84 KG/M2 | DIASTOLIC BLOOD PRESSURE: 60 MMHG | HEART RATE: 90 BPM | OXYGEN SATURATION: 98 % | TEMPERATURE: 97.4 F | SYSTOLIC BLOOD PRESSURE: 110 MMHG

## 2018-09-26 DIAGNOSIS — R30.0 DYSURIA: Primary | ICD-10-CM

## 2018-09-26 DIAGNOSIS — R31.9 HEMATURIA, UNSPECIFIED TYPE: ICD-10-CM

## 2018-09-26 DIAGNOSIS — R82.90 NONSPECIFIC FINDING ON EXAMINATION OF URINE: ICD-10-CM

## 2018-09-26 DIAGNOSIS — Z72.51 UNPROTECTED SEXUAL INTERCOURSE: ICD-10-CM

## 2018-09-26 DIAGNOSIS — N89.8 VAGINAL DISCHARGE: ICD-10-CM

## 2018-09-26 LAB
ALBUMIN UR-MCNC: NEGATIVE MG/DL
APPEARANCE UR: ABNORMAL
BACTERIA #/AREA URNS HPF: ABNORMAL /HPF
BILIRUB UR QL STRIP: NEGATIVE
COLOR UR AUTO: YELLOW
GLUCOSE UR STRIP-MCNC: NEGATIVE MG/DL
HGB UR QL STRIP: ABNORMAL
KETONES UR STRIP-MCNC: NEGATIVE MG/DL
LEUKOCYTE ESTERASE UR QL STRIP: ABNORMAL
NITRATE UR QL: NEGATIVE
NON-SQ EPI CELLS #/AREA URNS LPF: ABNORMAL /LPF
PH UR STRIP: 7 PH (ref 5–7)
RBC #/AREA URNS AUTO: ABNORMAL /HPF
SOURCE: ABNORMAL
SP GR UR STRIP: 1.01 (ref 1–1.03)
SPECIMEN SOURCE: NORMAL
UROBILINOGEN UR STRIP-ACNC: 0.2 EU/DL (ref 0.2–1)
WBC #/AREA URNS AUTO: ABNORMAL /HPF
WBC CLUMPS #/AREA URNS HPF: PRESENT /HPF
WET PREP SPEC: NORMAL

## 2018-09-26 PROCEDURE — 99214 OFFICE O/P EST MOD 30 MIN: CPT | Performed by: FAMILY MEDICINE

## 2018-09-26 PROCEDURE — 81001 URINALYSIS AUTO W/SCOPE: CPT | Performed by: FAMILY MEDICINE

## 2018-09-26 PROCEDURE — 87389 HIV-1 AG W/HIV-1&-2 AB AG IA: CPT | Performed by: FAMILY MEDICINE

## 2018-09-26 PROCEDURE — 87086 URINE CULTURE/COLONY COUNT: CPT | Performed by: FAMILY MEDICINE

## 2018-09-26 PROCEDURE — 87088 URINE BACTERIA CULTURE: CPT | Performed by: FAMILY MEDICINE

## 2018-09-26 PROCEDURE — 87186 SC STD MICRODIL/AGAR DIL: CPT | Performed by: FAMILY MEDICINE

## 2018-09-26 PROCEDURE — 36415 COLL VENOUS BLD VENIPUNCTURE: CPT | Performed by: FAMILY MEDICINE

## 2018-09-26 PROCEDURE — 86780 TREPONEMA PALLIDUM: CPT | Performed by: FAMILY MEDICINE

## 2018-09-26 PROCEDURE — 87210 SMEAR WET MOUNT SALINE/INK: CPT | Performed by: FAMILY MEDICINE

## 2018-09-26 NOTE — PATIENT INSTRUCTIONS
[facility]    If you have any questions regarding to your visit please contact your care team:       Team Purple:   Clinic Hours Telephone Number   Dr. Milagros Briones   7am-7pm  Monday - Thursday   7am-5pm  Fridays  [phone]  (Appointment scheduling available 24/7)   Urgent Care - [place] and [facility] - 11am-9pm Monday-Friday Saturday-Sunday- 9am-5pm   [place] - 5pm-9pm Monday-Friday Saturday-Sunday- 9am-5pm  [phone] - [place]  [phone] - [place]       What options do I have for a visit other than an office visit? We offer electronic visits (e-visits) and telephone visits, when medically appropriate.  Please check with your medical insurance to see if these types of visits are covered, as you will be responsible for any charges that are not paid by your insurance.      You can use [employer] (secure electronic communication) to access to your chart, send your primary care provider a message, or make an appointment. Ask a team member how to get started.     For a price quote for your services, please call our Consumer Price Line at [phone] or our Imaging Cost estimation line at [phone] (for imaging tests).

## 2018-09-26 NOTE — MR AVS SNAPSHOT
After Visit Summary   9/26/2018    Marlin Lopez    MRN: [id number]           Patient Information     Date Of Birth          1970        Visit Information        Provider Department      9/26/2018 11:30 AM Milagros Watters MD [facility]        Today's Diagnoses     Dysuria    -  1    Vaginal discharge        Hematuria, unspecified type          Care Instructions    [facility]    If you have any questions regarding to your visit please contact your care team:       Team Purple:   Clinic Hours Telephone Number   Dr. Milagros Briones   7am-7pm  Monday - Thursday   7am-5pm  Fridays  [phone]  (Appointment scheduling available 24/7)   Urgent Care - [place] and [facility] - 11am-9pm Monday-Friday Saturday-Sunday- 9am-5pm   [place] - 5pm-9pm Monday-Friday Saturday-Sunday- 9am-5pm  [phone] - [place]  [phone] [facility]       What options do I have for a visit other than an office visit? We offer electronic visits (e-visits) and telephone visits, when medically appropriate.  Please check with your medical insurance to see if these types of visits are covered, as you will be responsible for any charges that are not paid by your insurance.      You can use [employer] (secure electronic communication) to access to your chart, send your primary care provider a message, or make an appointment. Ask a team member how to get started.     For a price quote for your services, please call our Consumer Price Line at [phone] or our Imaging Cost estimation line at [phone] (for imaging tests).              Follow-ups after your visit        Additional Services     UROLOGY ADULT REFERRAL       Your provider has referred you to: FMG: [facility] [phone]   [email or web address]    Please be aware that coverage of these services is  "subject to the terms and limitations of your health insurance plan.  Call member services at your health plan with any benefit or coverage questions.      Please bring the following with you to your appointment:    (1) Any X-Rays, CTs or MRIs which have been performed.  Contact the facility where they were done to arrange for  prior to your scheduled appointment.    (2) List of current medications  (3) This referral request   (4) Any documents/labs given to you for this referral                  Who to contact     If you have questions or need follow up information about today's clinic visit or your schedule please contact [facility] directly at [phone].  Normal or non-critical lab and imaging results will be communicated to you by MyChart, letter or phone within 4 business days after the clinic has received the results. If you do not hear from us within 7 days, please contact the clinic through MyChart or phone. If you have a critical or abnormal lab result, we will notify you by phone as soon as possible.  Submit refill requests through [employer] or call your pharmacy and they will forward the refill request to us. Please allow 3 business days for your refill to be completed.          Additional Information About Your Visit        Care EveryWhere ID     This is your Care EveryWhere ID. This could be used by other organizations to access your [place] medical records  [id number]        Your Vitals Were     Pulse Temperature Respirations Height Last Period Pulse Oximetry    90 97.4  F (36.3  C) (Oral) 18 5' 8\" (1.727 m) 01/03/2014 98%    BMI (Body Mass Index)                   25.92 kg/m2            Blood Pressure from Last 3 Encounters:   09/26/18 110/60   07/31/18 132/78   06/18/18 122/74    Weight from Last 3 Encounters:   09/26/18 170 lb 8 oz (77.3 kg)   07/31/18 165 lb (74.8 kg)   06/18/18 178 lb 9.6 oz (81 kg)              We Performed the Following     UA reflex to Microscopic and " Culture     Urine Microscopic     UROLOGY ADULT REFERRAL     Wet prep        Primary Care Provider Office Phone # Fax #    Zach Corona [phone]       [address] [facility] [address]        Equal Access to Services     JIEDAMON TONY : Hadii aad ku hadsaraho Soomaali, waaxda luqadaha, qaybta kaalmada adeegyada, charli anain haychantelln apoorvarex hinton cornelia vasquez. So [facility] [phone].    ATENCIÓN: Si habla español, tiene a chavez disposición servicios gratuitos de asistencia lingüística. Llame al [phone].    We comply with applicable federal civil rights laws and [place] laws. We do not discriminate on the basis of race, color, national origin, age, disability, sex, sexual orientation, or gender identity.            Thank you!     Thank you for choosing [facility]  for your care. Our goal is always to provide you with excellent care. Hearing back from our patients is one way we can continue to improve our services. Please take a few minutes to complete the written survey that you may receive in the mail after your visit with us. Thank you!             Your Updated Medication List - Protect others around you: Learn how to safely use, store and throw away your medicines at www.disposemymeds.org.          This list is accurate as of 9/26/18 12:10 PM.  Always use your most recent med list.                   Brand Name Dispense Instructions for use Diagnosis    amphetamine-dextroamphetamine 20 MG per tablet    ADDERALL    60 tablet    Take 1 tablet (20 mg) by mouth 2 times daily    Attention deficit hyperactivity disorder (ADHD), unspecified ADHD type       aspirin 325 MG EC tablet      Take  by mouth daily.        diclofenac 75 MG EC tablet    VOLTAREN    20 tablet    Take 1 tablet (75 mg) by mouth 2 times daily as needed for moderate pain    Chest wall pain       fexofenadine 180 MG tablet    ALLEGRA    30 tablet    Take 1 tablet (180 mg) by mouth daily    Itchy eyes        gabapentin 300 MG capsule    NEURONTIN    90 capsule    Take 1 capsule (300 mg) by mouth 3 times daily    Fibromyalgia muscle pain       hydrOXYzine 50 MG capsule    VISTARIL    30 capsule    Take 1 capsule (50 mg) by mouth 3 times daily as needed for itching    Scalp itch       ibuprofen 800 MG tablet    ADVIL/MOTRIN    30 tablet    TAKE 1 TABLET(800 MG) BY MOUTH EVERY 8 HOURS AS NEEDED FOR MODERATE PAIN    Fibromyalgia       ketotifen 0.025 % Soln ophthalmic solution    ZADITOR    1 Bottle    Place 1 drop into both eyes 2 times daily    Itchy eyes       metroNIDAZOLE 0.75 % vaginal gel    METROGEL    70 g    Place 1 applicator vaginally for 5 nights before bed, then use twice weekly for 4 months    BV (bacterial vaginosis)       nicotine 21 MG/24HR 24 hr patch    NICODERM CQ    30 patch    Place 1 patch onto the skin every 24 hours    Nicotine withdrawal       ondansetron 4 MG ODT tab    ZOFRAN-ODT    5 tablet    Take 1 tablet (4 mg) by mouth every 8 hours as needed for nausea        polyethylene glycol powder    MIRALAX    510 g    Take 17 g (1 capful) by mouth daily    Flatulence, eructation, and gas pain       traZODone 50 MG tablet    DESYREL    90 tablet    TAKE 1 TABLET(50 MG) BY MOUTH AT BEDTIME    Insomnia, unspecified type       valACYclovir 1000 mg tablet    VALTREX    20 tablet    Take 2 tablets (2,000 mg) by mouth 2 times daily    Herpes labialis

## 2018-09-26 NOTE — PROGRESS NOTES
SUBJECTIVE:   Marlin Lopez is a 48 year old female who presents to clinic today for the following health issues:      Patient presents with:  Urinary Problem: frequency, urgency, pain x 3-4 days  Vaginal Discharge: white yellowish color  Vaginal Bleeding: light bleeding   Abdominal Cramping  Patient Request: would like to get white blood count              Problem list and histories reviewed & adjusted, as indicated.  Additional history: as documented    Patient Active Problem List   Diagnosis     Anxiety state     Fibromyalgia     Sleep problems     Recurrent cold sores     Moderate major depression (H)     ADHD (attention deficit hyperactivity disorder)     CARDIOVASCULAR SCREENING; LDL GOAL LESS THAN 130     Family history of colon cancer     Allergic rhinitis     Renal cyst, left     Chronic constipation     Diverticulosis of large intestine without hemorrhage     Ulceration of colon determined by endoscopy     Past Surgical History:   Procedure Laterality Date     C REMOVAL OF OVARY(S)  1992    Left, cyst     TONSILLECTOMY  1979       Social History   Substance Use Topics     Smoking status: Former Smoker     Packs/day: 0.50     Types: Cigarettes     Start date: 1/1/1980     Quit date: 9/20/2015     Smokeless tobacco: Never Used      Comment:  using e-cig daily     Alcohol use 0.0 oz/week     0 Standard drinks or equivalent per week      Comment:  drinks 3 days in a week, 2 beers each time      Family History   Problem Relation Age of Onset     Hypertension Maternal Grandmother      Cerebrovascular Disease Maternal Grandmother      Alzheimer Disease Maternal Grandmother      Arthritis Maternal Grandmother      Obesity Maternal Grandmother      Cancer Other      Colon     Breast Cancer Other      Cancer - colorectal Paternal Grandfather          Current Outpatient Prescriptions   Medication Sig Dispense Refill     amphetamine-dextroamphetamine (ADDERALL) 20 MG per tablet Take 1 tablet (20 mg) by mouth 2  times daily 60 tablet 0     aspirin 325 MG EC tablet Take  by mouth daily.       fexofenadine (ALLEGRA) 180 MG tablet Take 1 tablet (180 mg) by mouth daily 30 tablet 1     gabapentin (NEURONTIN) 300 MG capsule Take 1 capsule (300 mg) by mouth 3 times daily 90 capsule 2     hydrOXYzine (VISTARIL) 50 MG capsule Take 1 capsule (50 mg) by mouth 3 times daily as needed for itching 30 capsule 0     ibuprofen (ADVIL/MOTRIN) 800 MG tablet TAKE 1 TABLET(800 MG) BY MOUTH EVERY 8 HOURS AS NEEDED FOR MODERATE PAIN 30 tablet 5     ketotifen (ZADITOR) 0.025 % SOLN ophthalmic solution Place 1 drop into both eyes 2 times daily 1 Bottle 1     metroNIDAZOLE (METROGEL) 0.75 % vaginal gel Place 1 applicator vaginally for 5 nights before bed, then use twice weekly for 4 months 70 g 5     nicotine (NICODERM CQ) 21 MG/24HR 24 hr patch Place 1 patch onto the skin every 24 hours 30 patch 4     polyethylene glycol (MIRALAX) powder Take 17 g (1 capful) by mouth daily 510 g 1     traZODone (DESYREL) 50 MG tablet TAKE 1 TABLET(50 MG) BY MOUTH AT BEDTIME 90 tablet 1     valACYclovir (VALTREX) 1000 mg tablet Take 2 tablets (2,000 mg) by mouth 2 times daily 20 tablet 0     diclofenac (VOLTAREN) 75 MG EC tablet Take 1 tablet (75 mg) by mouth 2 times daily as needed for moderate pain (Patient not taking: Reported on 9/26/2018) 20 tablet 0     ondansetron (ZOFRAN-ODT) 4 MG ODT tab Take 1 tablet (4 mg) by mouth every 8 hours as needed for nausea (Patient not taking: Reported on 7/31/2018) 5 tablet 0     Allergies   Allergen Reactions     Cats Itching     Eyes, Sneezy,     Codeine Nausea and Vomiting     BP Readings from Last 3 Encounters:   09/26/18 110/60   07/31/18 132/78   06/18/18 122/74    Wt Readings from Last 3 Encounters:   09/26/18 170 lb 8 oz (77.3 kg)   07/31/18 165 lb (74.8 kg)   06/18/18 178 lb 9.6 oz (81 kg)                  Labs reviewed in EPIC    Reviewed and updated as needed this visit by clinical staff  Tobacco  Allergies  Meds   "Med Hx  Surg Hx  Fam Hx  Soc Hx      Reviewed and updated as needed this visit by Provider         ROS:  This 48 year old female is here today for several non-specific symptoms.   She has a history of constipation in the past as well as bowel problems. She had colonoscopy and upper endo 2/2018 and 6/2018, which were normal, in spite of abdomen cat scan on 2/5/18 in ER showed possible colitis in descending colon. She has chronic recurrent bacterial vaginosis and wonders if it is back again. She has blood coming from either her vagina, urine, or colon. She isn't sure. But she has pain with urination. She also had unprotected intercourse with a new partner recently. Now she is worried. No fevers. No flank pain. Tried over the counter AZO and that didn't help much. All other review of systems are negative  Personal, family, and social history reviewed with patient and revised.         OBJECTIVE:     /60  Pulse 90  Temp 97.4  F (36.3  C) (Oral)  Resp 18  Ht 5' 8\" (1.727 m)  Wt 170 lb 8 oz (77.3 kg)  LMP 01/03/2014  SpO2 98%  BMI 25.92 kg/m2  Body mass index is 25.92 kg/(m^2).  Labia and vulva are normal. Vaginal vault is normal with no discharge and no blood seen  No blood seen around anal opening  Bimanual exam reveals no masses, but she is slightly tender over her right abdomen, but not as far lateral as her appendix.   Well hydrated  Well nourished  Well groomed  Moves well on exam table     Diagnostic Test Results:  Results for orders placed or performed in visit on 09/26/18 (from the past 24 hour(s))   UA reflex to Microscopic and Culture   Result Value Ref Range    Color Urine Yellow     Appearance Urine Slightly Cloudy     Glucose Urine Negative NEG^Negative mg/dL    Bilirubin Urine Negative NEG^Negative    Ketones Urine Negative NEG^Negative mg/dL    Specific Gravity Urine 1.010 1.003 - 1.035    Blood Urine Large (A) NEG^Negative    pH Urine 7.0 5.0 - 7.0 pH    Protein Albumin Urine Negative " NEG^Negative mg/dL    Urobilinogen Urine 0.2 0.2 - 1.0 EU/dL    Nitrite Urine Negative NEG^Negative    Leukocyte Esterase Urine Large (A) NEG^Negative    Source Midstream Urine    Urine Microscopic   Result Value Ref Range    WBC Urine  (A) OTO5^0 - 5 /HPF    RBC Urine  (A) OTO2^O - 2 /HPF    WBC Clumps Present (A) NEG^Negative /HPF    Squamous Epithelial /LPF Urine Few FEW^Few /LPF    Bacteria Urine Few (A) NEG^Negative /HPF   Wet prep   Result Value Ref Range    Specimen Description Vagina     Wet Prep No Trichomonas seen     Wet Prep No clue cells seen     Wet Prep No yeast seen        ASSESSMENT/PLAN:        Her record and care everywhere was reviewed in detail. Greater than 50% dedicated to counseling and coordination of above issues. Spent a full 35 minutes with patient describing my concerns and hearing her story       1. Dysuria  As above. She has a lot of blood in her urine. Makes me worried about a possible bladder cancer   - UA reflex to Microscopic and Culture  - Urine Microscopic  - Urine Culture Aerobic Bacterial    2. Vaginal discharge  As above, reassured. Advised to stop wearing underwear at bedtime   - Wet prep    3. Hematuria, unspecified type  As above, this is worrisome, though she did have a normal abdomen cat scan 2/5/18 in the ER  - UROLOGY ADULT REFERRAL    4. Nonspecific finding on examination of urine  As above   - Urine Culture Aerobic Bacterial    5. Unprotected sexual intercourse  As above   - Treponema Abs w Reflex to RPR and Titer  - HIV Antigen Antibody Combo    Return to clinic if no improvement     JEREMÍAS BARILLAS MD  [facility]

## 2018-09-27 LAB — HIV 1+2 AB+HIV1 P24 AG SERPL QL IA: NONREACTIVE

## 2018-09-28 LAB
BACTERIA SPEC CULT: ABNORMAL
BACTERIA SPEC CULT: ABNORMAL
SPECIMEN SOURCE: ABNORMAL
T PALLIDUM AB SER QL: NONREACTIVE

## 2018-09-28 RX ORDER — SULFAMETHOXAZOLE/TRIMETHOPRIM 800-160 MG
1 TABLET ORAL 2 TIMES DAILY
Qty: 14 TABLET | Refills: 0 | Status: SHIPPED | OUTPATIENT
Start: 2018-09-28 | End: 2019-03-15

## 2018-10-02 DIAGNOSIS — R14.2 FLATULENCE, ERUCTATION, AND GAS PAIN: ICD-10-CM

## 2018-10-02 DIAGNOSIS — R14.3 FLATULENCE, ERUCTATION, AND GAS PAIN: ICD-10-CM

## 2018-10-02 DIAGNOSIS — R14.1 FLATULENCE, ERUCTATION, AND GAS PAIN: ICD-10-CM

## 2018-10-02 DIAGNOSIS — F33.1 MAJOR DEPRESSIVE DISORDER, RECURRENT EPISODE, MODERATE (H): ICD-10-CM

## 2018-10-02 DIAGNOSIS — F41.1 GAD (GENERALIZED ANXIETY DISORDER): ICD-10-CM

## 2018-10-03 RX ORDER — POLYETHYLENE GLYCOL 3350 17 G/17G
POWDER, FOR SOLUTION ORAL
Qty: 527 G | Refills: 3 | Status: SHIPPED | OUTPATIENT
Start: 2018-10-03 | End: 2022-12-13

## 2018-10-03 NOTE — TELEPHONE ENCOUNTER
sertraline (ZOLOFT) 100 MG tablet (Discontinued)      Last Written Prescription Date:  10/31/2017  Last Fill Quantity: 30,   # refills: 0  Last Office Visit: 9/26/2018  Future Office visit:       Routing refill request to provider for review/approval because:  Drug not active on patient's medication list

## 2018-10-04 RX ORDER — SERTRALINE HYDROCHLORIDE 100 MG/1
TABLET, FILM COATED ORAL
Qty: 30 TABLET | Refills: 0 | OUTPATIENT
Start: 2018-10-04

## 2018-10-04 NOTE — TELEPHONE ENCOUNTER
Spoke with patient she is not on medication and does not want to restart medication. Requested in error. Please remove medication and close encounter. Angelica Harper MA

## 2018-10-25 ENCOUNTER — OFFICE VISIT (OUTPATIENT)
Dept: FAMILY MEDICINE | Facility: CLINIC | Age: 48
End: 2018-10-25
Payer: COMMERCIAL

## 2018-10-25 VITALS
TEMPERATURE: 97.6 F | DIASTOLIC BLOOD PRESSURE: 82 MMHG | WEIGHT: 170.8 LBS | BODY MASS INDEX: 25.97 KG/M2 | RESPIRATION RATE: 18 BRPM | OXYGEN SATURATION: 98 % | SYSTOLIC BLOOD PRESSURE: 136 MMHG | HEART RATE: 91 BPM

## 2018-10-25 DIAGNOSIS — N76.0 BV (BACTERIAL VAGINOSIS): ICD-10-CM

## 2018-10-25 DIAGNOSIS — B96.89 BV (BACTERIAL VAGINOSIS): ICD-10-CM

## 2018-10-25 DIAGNOSIS — R30.0 DYSURIA: ICD-10-CM

## 2018-10-25 DIAGNOSIS — N89.8 VAGINAL DISCHARGE: Primary | ICD-10-CM

## 2018-10-25 DIAGNOSIS — R82.90 NONSPECIFIC FINDING ON EXAMINATION OF URINE: ICD-10-CM

## 2018-10-25 DIAGNOSIS — Z23 NEED FOR PROPHYLACTIC VACCINATION AND INOCULATION AGAINST INFLUENZA: ICD-10-CM

## 2018-10-25 LAB
ALBUMIN UR-MCNC: NEGATIVE MG/DL
APPEARANCE UR: ABNORMAL
BILIRUB UR QL STRIP: NEGATIVE
COLOR UR AUTO: YELLOW
GLUCOSE UR STRIP-MCNC: NEGATIVE MG/DL
HGB UR QL STRIP: ABNORMAL
KETONES UR STRIP-MCNC: NEGATIVE MG/DL
LEUKOCYTE ESTERASE UR QL STRIP: ABNORMAL
NITRATE UR QL: NEGATIVE
NON-SQ EPI CELLS #/AREA URNS LPF: ABNORMAL /LPF
PH UR STRIP: 7.5 PH (ref 5–7)
RBC #/AREA URNS AUTO: ABNORMAL /HPF
SOURCE: ABNORMAL
SP GR UR STRIP: 1.01 (ref 1–1.03)
SPECIMEN SOURCE: ABNORMAL
UROBILINOGEN UR STRIP-ACNC: 0.2 EU/DL (ref 0.2–1)
WBC #/AREA URNS AUTO: ABNORMAL /HPF
WET PREP SPEC: ABNORMAL

## 2018-10-25 PROCEDURE — 90471 IMMUNIZATION ADMIN: CPT | Performed by: FAMILY MEDICINE

## 2018-10-25 PROCEDURE — 81001 URINALYSIS AUTO W/SCOPE: CPT | Performed by: FAMILY MEDICINE

## 2018-10-25 PROCEDURE — 87086 URINE CULTURE/COLONY COUNT: CPT | Performed by: FAMILY MEDICINE

## 2018-10-25 PROCEDURE — 90686 IIV4 VACC NO PRSV 0.5 ML IM: CPT | Performed by: FAMILY MEDICINE

## 2018-10-25 PROCEDURE — 87210 SMEAR WET MOUNT SALINE/INK: CPT | Performed by: FAMILY MEDICINE

## 2018-10-25 PROCEDURE — 99213 OFFICE O/P EST LOW 20 MIN: CPT | Mod: 25 | Performed by: FAMILY MEDICINE

## 2018-10-25 RX ORDER — CYCLOBENZAPRINE HCL 10 MG
10 TABLET ORAL AT BEDTIME
Refills: 1 | COMMUNITY
Start: 2018-09-10 | End: 2019-09-15

## 2018-10-25 RX ORDER — METRONIDAZOLE 7.5 MG/G
1 GEL VAGINAL AT BEDTIME
Qty: 70 G | Refills: 0 | Status: SHIPPED | OUTPATIENT
Start: 2018-10-25 | End: 2019-03-15

## 2018-10-25 RX ORDER — SULFAMETHOXAZOLE/TRIMETHOPRIM 800-160 MG
1 TABLET ORAL 2 TIMES DAILY
Qty: 6 TABLET | Refills: 0 | Status: SHIPPED | OUTPATIENT
Start: 2018-10-25 | End: 2019-03-15

## 2018-10-25 ASSESSMENT — PAIN SCALES - GENERAL: PAINLEVEL: NO PAIN (0)

## 2018-10-25 NOTE — PROGRESS NOTES
SUBJECTIVE:   Marlin Lopez is a 48 year old female who presents to clinic today for the following health issues:      Vaginal Symptoms      Duration: on going for one month    Description  vaginal discharge - yellowish and odor    Intensity:  moderate    Accompanying signs and symptoms (fever/dysuria/abdominal or back pain): Dysuria off and on.     History  Sexually active: not at present  Possibility of pregnancy: No  Recent antibiotic use: YES Septra for UTI on 09/26/18 for 7 days    Precipitating or alleviating factors: None    Therapies tried and outcome: Septra and AZO   Outcome: are not helping  Pt does not have dysuria or frequency or urgency  No flanl pain  No fever or chills  Pt had UTI recently        Problem list and histories reviewed & adjusted, as indicated.  Additional history: as documented    Patient Active Problem List   Diagnosis     Anxiety state     Fibromyalgia     Sleep problems     Recurrent cold sores     Moderate major depression (H)     ADHD (attention deficit hyperactivity disorder)     CARDIOVASCULAR SCREENING; LDL GOAL LESS THAN 130     Family history of colon cancer     Allergic rhinitis     Renal cyst, left     Chronic constipation     Diverticulosis of large intestine without hemorrhage     Ulceration of colon determined by endoscopy     Past Surgical History:   Procedure Laterality Date     C REMOVAL OF OVARY(S)  1992    Left, cyst     TONSILLECTOMY  1979       Social History   Substance Use Topics     Smoking status: Former Smoker     Packs/day: 0.50     Types: Cigarettes     Start date: 1/1/1980     Quit date: 9/20/2015     Smokeless tobacco: Never Used      Comment:  using e-cig daily     Alcohol use 0.0 oz/week     0 Standard drinks or equivalent per week      Comment:  drinks 3 days in a week, 2 beers each time      Family History   Problem Relation Age of Onset     Hypertension Maternal Grandmother      Cerebrovascular Disease Maternal Grandmother      Alzheimer  Disease Maternal Grandmother      Arthritis Maternal Grandmother      Obesity Maternal Grandmother      Cancer Other      Colon     Breast Cancer Other      Cancer - colorectal Paternal Grandfather          Current Outpatient Prescriptions   Medication Sig Dispense Refill     amphetamine-dextroamphetamine (ADDERALL) 20 MG per tablet Take 1 tablet (20 mg) by mouth 2 times daily 60 tablet 0     aspirin 325 MG EC tablet Take  by mouth daily.       fexofenadine (ALLEGRA) 180 MG tablet Take 1 tablet (180 mg) by mouth daily 30 tablet 1     gabapentin (NEURONTIN) 300 MG capsule Take 1 capsule (300 mg) by mouth 3 times daily 90 capsule 2     hydrOXYzine (VISTARIL) 50 MG capsule Take 1 capsule (50 mg) by mouth 3 times daily as needed for itching 30 capsule 0     ibuprofen (ADVIL/MOTRIN) 800 MG tablet TAKE 1 TABLET(800 MG) BY MOUTH EVERY 8 HOURS AS NEEDED FOR MODERATE PAIN 30 tablet 5     ketotifen (ZADITOR) 0.025 % SOLN ophthalmic solution Place 1 drop into both eyes 2 times daily 1 Bottle 1     metroNIDAZOLE (METROGEL) 0.75 % vaginal gel Place 1 applicator (5 g) vaginally At Bedtime for 5 days 70 g 0     nicotine (NICODERM CQ) 21 MG/24HR 24 hr patch Place 1 patch onto the skin every 24 hours 30 patch 4     polyethylene glycol (MIRALAX/GLYCOLAX) powder MIX 17 GRAMS AS DIRECTED AND TAKE BY MOUTH EVERY  g 3     sulfamethoxazole-trimethoprim (BACTRIM DS/SEPTRA DS) 800-160 MG per tablet Take 1 tablet by mouth 2 times daily for 3 days 6 tablet 0     traZODone (DESYREL) 50 MG tablet TAKE 1 TABLET(50 MG) BY MOUTH AT BEDTIME 90 tablet 1     valACYclovir (VALTREX) 1000 mg tablet Take 2 tablets (2,000 mg) by mouth 2 times daily 20 tablet 0     cyclobenzaprine (FLEXERIL) 10 MG tablet Take 10 mg by mouth At Bedtime  1     diclofenac (VOLTAREN) 75 MG EC tablet Take 1 tablet (75 mg) by mouth 2 times daily as needed for moderate pain (Patient not taking: Reported on 9/26/2018) 20 tablet 0     metroNIDAZOLE (METROGEL) 0.75 % vaginal  gel Place 1 applicator vaginally for 5 nights before bed, then use twice weekly for 4 months (Patient not taking: Reported on 10/25/2018) 70 g 5     ondansetron (ZOFRAN-ODT) 4 MG ODT tab Take 1 tablet (4 mg) by mouth every 8 hours as needed for nausea (Patient not taking: Reported on 10/25/2018) 5 tablet 0     Allergies   Allergen Reactions     Cats Itching     Eyes, Sneezy,     Codeine Nausea and Vomiting     Recent Labs   Lab Test  02/05/18   1040  12/22/16   1258  06/29/16   12/29/15   1453  08/04/15   1324   LDL   --    --    --    --    --   103*   --    HDL   --    --    --    --    --   78   --    TRIG   --    --    --    --    --   29   --    ALT  18  17   --   30   < >   --    --    CR  0.64  0.76   < >  0.64   --    --    --    GFRESTIMATED  >90  81   < >  >60   --    --    --    GFRESTBLACK  >90  >90  African American GFR Calc     --   >60   < >   --    --    POTASSIUM  3.7  3.8   < >  5.0   --    --    --    TSH   --    --    --   1.28   --    --   0.85    < > = values in this interval not displayed.      BP Readings from Last 3 Encounters:   10/25/18 136/82   09/26/18 110/60   07/31/18 132/78    Wt Readings from Last 3 Encounters:   10/25/18 170 lb 12.8 oz (77.5 kg)   09/26/18 170 lb 8 oz (77.3 kg)   07/31/18 165 lb (74.8 kg)                  Labs reviewed in EPIC    Reviewed and updated as needed this visit by clinical staff  Allergies  Meds       Reviewed and updated as needed this visit by Provider         ROS:  CONSTITUTIONAL: NEGATIVE for fever, chills, change in weight  ENT/MOUTH: NEGATIVE for ear, mouth and throat problems  RESP: NEGATIVE for significant cough or SOB  CV: NEGATIVE for chest pain, palpitations or peripheral edema  GI: NEGATIVE for nausea, abdominal pain, heartburn, or change in bowel habits  MUSCULOSKELETAL: NEGATIVE for significant arthralgias or myalgia  NEURO: NEGATIVE for weakness, dizziness or paresthesias  PSYCHIATRIC: NEGATIVE for changes in mood or affect    OBJECTIVE:      /82  Pulse 91  Temp 97.6  F (36.4  C) (Oral)  Resp 18  Wt 170 lb 12.8 oz (77.5 kg)  LMP 01/03/2014  SpO2 98%  BMI 25.97 kg/m2  Body mass index is 25.97 kg/(m^2).  GENERAL: healthy, alert and no distress  NECK: no adenopathy, no asymmetry, masses, or scars and thyroid normal to palpation  RESP: lungs clear to auscultation - no rales, rhonchi or wheezes  CV: regular rate and rhythm, normal S1 S2, no S3 or S4, no murmur, click or rub, no peripheral edema and peripheral pulses strong  ABDOMEN: soft, nontender, no hepatosplenomegaly, no masses and bowel sounds normal  MS: no gross musculoskeletal defects noted, no edema    Diagnostic Test Results:  Results for orders placed or performed in visit on 10/25/18 (from the past 24 hour(s))   *UA reflex to Microscopic and Culture ([place] and [facility] (except [place] and [place])   Result Value Ref Range    Color Urine Yellow     Appearance Urine Slightly Cloudy     Glucose Urine Negative NEG^Negative mg/dL    Bilirubin Urine Negative NEG^Negative    Ketones Urine Negative NEG^Negative mg/dL    Specific Gravity Urine 1.010 1.003 - 1.035    Blood Urine Trace (A) NEG^Negative    pH Urine 7.5 (H) 5.0 - 7.0 pH    Protein Albumin Urine Negative NEG^Negative mg/dL    Urobilinogen Urine 0.2 0.2 - 1.0 EU/dL    Nitrite Urine Negative NEG^Negative    Leukocyte Esterase Urine Large (A) NEG^Negative    Source Midstream Urine    Wet prep   Result Value Ref Range    Specimen Description Vagina     Wet Prep Clue cells seen (A)     Wet Prep No Trichomonas seen     Wet Prep No yeast seen     Wet Prep (Note)  SELF COLLECT.      Urine Microscopic   Result Value Ref Range    WBC Urine 10-25 (A) OTO5^0 - 5 /HPF    RBC Urine 2-5 (A) OTO2^O - 2 /HPF    Squamous Epithelial /LPF Urine Few FEW^Few /LPF       ASSESSMENT/PLAN:   1. Vaginal discharge    - Wet prep  - Urine Microscopic    2. Dysuria  SEE [facility] orders  The potential side effects of this medication have been  discussed with the patient.  Call if any significant problems with these are experienced.  Lots of fluids  - *UA reflex to Microscopic and Culture ([place] and [facility] (except [place] and [place])  - sulfamethoxazole-trimethoprim (BACTRIM DS/SEPTRA DS) 800-160 MG per tablet; Take 1 tablet by mouth 2 times daily for 3 days  Dispense: 6 tablet; Refill: 0    3. BV (bacterial vaginosis)    - metroNIDAZOLE (METROGEL) 0.75 % vaginal gel; Place 1 applicator (5 g) vaginally At Bedtime for 5 days  Dispense: 70 g; Refill: 0  SEE [facility] orders  The potential side effects of this medication have been discussed with the patient.  Call if any significant problems with these are experienced.    4. Nonspecific finding on examination of urine    - Urine Culture Aerobic Bacterial    5. Need for prophylactic vaccination and inoculation against influenza  Advised   - FLU VACCINE, SPLIT VIRUS, IM (QUADRIVALENT) [90479]- >3 YRS  - Vaccine Administration, Initial [17507]  Follow up if not Urban Tyler MD  [facility] [place]

## 2018-10-25 NOTE — MR AVS SNAPSHOT
After Visit Summary   10/25/2018    Marlin Lopez    MRN: [id number]           Patient Information     Date Of Birth          1970        Visit Information        Provider Department      10/25/2018 1:00 PM Myriam Tyler MD [facility]        Today's Diagnoses     Vaginal discharge    -  1    Dysuria          Care Instructions    [facility]    If you have any questions regarding to your visit please contact your care team:       Team Red:   Clinic Hours Telephone Number   Dr. Estephania Murray NP 7am-7pm  Monday - Thursday   7am-5pm  Fridays  [phone]  (Appointment scheduling available 24/7)   Urgent Care - [place] and [place] - 11am-9pm Monday-Friday Saturday-Sunday- 9am-5pm   [place] - 5pm-9pm Monday-Friday Saturday-Sunday- 9am-5pm  [phone] - [place]  [phone] - [place]       What options do I have for a visit other than an office visit? We offer electronic visits (e-visits) and telephone visits, when medically appropriate.  Please check with your medical insurance to see if these types of visits are covered, as you will be responsible for any charges that are not paid by your insurance.      You can use delicious (secure electronic communication) to access to your chart, send your primary care provider a message, or make an appointment. Ask a team member how to get started.     For a price quote for your services, please call our Consumer Price Line at [phone] or our Imaging Cost estimation line at [phone] (for imaging tests).    Discharged by Clementina COOLEY CMA ([facility])            Follow-ups after your visit        Your next 10 appointments already scheduled     Oct 31, 2018 10:45 AM CDT   [facility]    [address] [place] [address]  [place] [id number] [phone]              Who to contact      If you have questions or need follow up information about today's clinic visit or your schedule please contact [facility] directly at [phone].  Normal or non-critical lab and imaging results will be communicated to you by MyChart, letter or phone within 4 business days after the clinic has received the results. If you do not hear from us within 7 days, please contact the clinic through MyChart or phone. If you have a critical or abnormal lab result, we will notify you by phone as soon as possible.  Submit refill requests through i'mma or call your pharmacy and they will forward the refill request to us. Please allow 3 business days for your refill to be completed.          Additional Information About Your Visit        Care EveryWhere ID     This is your Care EveryWhere ID. This could be used by other organizations to access your [facility] medical records  [id number]        Your Vitals Were     Pulse Temperature Respirations Last Period Pulse Oximetry BMI (Body Mass Index)    91 97.6  F (36.4  C) (Oral) 18 01/03/2014 98% 25.97 kg/m2       Blood Pressure from Last 3 Encounters:   10/25/18 136/82   09/26/18 110/60   07/31/18 132/78    Weight from Last 3 Encounters:   10/25/18 170 lb 12.8 oz (77.5 kg)   09/26/18 170 lb 8 oz (77.3 kg)   07/31/18 165 lb (74.8 kg)              We Performed the Following     *UA reflex to Microscopic and Culture ([place] and [facility] (except [place] and [place])     Urine Microscopic     Wet prep        Primary Care Provider Office Phone # Fax #    Zach Corona [phone] [address]  [place] [address]        Equal Access to Services     [facility]: Hadii susie Salamanca, waaxda luqadaha, qaybta kaalmada ethan, charli vasquez. So [facility] [phone].    ATENCIÓN: Si habla español, tiene a chavez disposición servicios gratuitos de asistencia lingüística. Llame al  [phone].    We comply with applicable federal civil rights laws and [place] laws. We do not discriminate on the basis of race, color, national origin, age, disability, sex, sexual orientation, or gender identity.            Thank you!     Thank you for choosing [facility]  for your care. Our goal is always to provide you with excellent care. Hearing back from our patients is one way we can continue to improve our services. Please take a few minutes to complete the written survey that you may receive in the mail after your visit with us. Thank you!             Your Updated Medication List - Protect others around you: Learn how to safely use, store and throw away your medicines at www.disposemymeds.org.          This list is accurate as of 10/25/18  1:45 PM.  Always use your most recent med list.                   Brand Name Dispense Instructions for use Diagnosis    amphetamine-dextroamphetamine 20 MG per tablet    ADDERALL    60 tablet    Take 1 tablet (20 mg) by mouth 2 times daily    Attention deficit hyperactivity disorder (ADHD), unspecified ADHD type       aspirin 325 MG EC tablet      Take  by mouth daily.        cyclobenzaprine 10 MG tablet    FLEXERIL     Take 10 mg by mouth At Bedtime        diclofenac 75 MG EC tablet    VOLTAREN    20 tablet    Take 1 tablet (75 mg) by mouth 2 times daily as needed for moderate pain    Chest wall pain       fexofenadine 180 MG tablet    ALLEGRA    30 tablet    Take 1 tablet (180 mg) by mouth daily    Itchy eyes       gabapentin 300 MG capsule    NEURONTIN    90 capsule    Take 1 capsule (300 mg) by mouth 3 times daily    Fibromyalgia muscle pain       hydrOXYzine 50 MG capsule    VISTARIL    30 capsule    Take 1 capsule (50 mg) by mouth 3 times daily as needed for itching    Scalp itch       ibuprofen 800 MG tablet    ADVIL/MOTRIN    30 tablet    TAKE 1 TABLET(800 MG) BY MOUTH EVERY 8 HOURS AS NEEDED FOR MODERATE PAIN    Fibromyalgia       ketotifen  0.025 % Soln ophthalmic solution    ZADITOR    1 Bottle    Place 1 drop into both eyes 2 times daily    Itchy eyes       metroNIDAZOLE 0.75 % vaginal gel    METROGEL    70 g    Place 1 applicator vaginally for 5 nights before bed, then use twice weekly for 4 months    BV (bacterial vaginosis)       nicotine 21 MG/24HR 24 hr patch    NICODERM CQ    30 patch    Place 1 patch onto the skin every 24 hours    Nicotine withdrawal       ondansetron 4 MG ODT tab    ZOFRAN-ODT    5 tablet    Take 1 tablet (4 mg) by mouth every 8 hours as needed for nausea        polyethylene glycol powder    MIRALAX/GLYCOLAX    527 g    MIX 17 GRAMS AS DIRECTED AND TAKE BY MOUTH EVERY DAY    Flatulence, eructation, and gas pain       traZODone 50 MG tablet    DESYREL    90 tablet    TAKE 1 TABLET(50 MG) BY MOUTH AT BEDTIME    Insomnia, unspecified type       valACYclovir 1000 mg tablet    VALTREX    20 tablet    Take 2 tablets (2,000 mg) by mouth 2 times daily    Herpes labialis

## 2018-10-25 NOTE — PROGRESS NOTES

## 2018-10-25 NOTE — LETTER
My Depression Action Plan  Name: Marlin Lopez   Date of Birth 1970  Date: 10/25/2018    My doctor: Zach Corona   My clinic: [place] [id number] [facility]  [place] [address]  [phone]          GREEN    ZONE   Good Control    What it looks like:     Things are going generally well. You have normal up s and down s. You may even feel depressed from time to time, but bad moods usually last less than a day.   What you need to do:  1. Continue to care for yourself (see self care plan)  2. Check your depression survival kit and update it as needed  3. Follow your physician s recommendations including any medication.  4. Do not stop taking medication unless you consult with your physician first.           YELLOW         ZONE Getting Worse    What it looks like:     Depression is starting to interfere with your life.     It may be hard to get out of bed; you may be starting to isolate yourself from others.    Symptoms of depression are starting to last most all day and this has happened for several days.     You may have suicidal thoughts but they are not constant.   What you need to do:     1. Call your care team, your response to treatment will improve if you keep your care team informed of your progress. Yellow periods are signs an adjustment may need to be made.     2. Continue your self-care, even if you have to fake it!    3. Talk to someone in your support network    4. Open up your depression survival kit           RED    ZONE Medical Alert - Get Help    What it looks like:     Depression is seriously interfering with your life.     You may experience these or other symptoms: You can t get out of bed most days, can t work or engage in other necessary activities, you have trouble taking care of basic hygiene, or basic responsibilities, thoughts of suicide or death that will not go away, self-injurious behavior.     What you need to do:  1. Call your care team  and request a same-day appointment. If they are not available (weekends or after hours) call your local crisis line, emergency room or 911.            Depression Self Care Plan / Survival Kit    Self-Care for Depression  Here s the deal. Your body and mind are really not as separate as most people think.  What you do and think affects how you feel and how you feel influences what you do and think. This means if you do things that people who feel good do, it will help you feel better.  Sometimes this is all it takes.  There is also a place for medication and therapy depending on how severe your depression is, so be sure to consult with your medical provider and/ or Behavioral Health Consultant if your symptoms are worsening or not improving.     In order to better manage my stress, I will:    Exercise  Get some form of exercise, every day. This will help reduce pain and release endorphins, the  feel good  chemicals in your brain. This is almost as good as taking antidepressants!  This is not the same as joining a gym and then never going! (they count on that by the way ) It can be as simple as just going for a walk or doing some gardening, anything that will get you moving.      Hygiene   Maintain good hygiene (Get out of bed in the morning, Make your bed, Brush your teeth, Take a shower, and Get dressed like you were going to work, even if you are unemployed).  If your clothes don't fit try to get ones that do.    Diet  I will strive to eat foods that are good for me, drink plenty of water, and avoid excessive sugar, caffeine, alcohol, and other mood-altering substances.  Some foods that are helpful in depression are: complex carbohydrates, B vitamins, flaxseed, fish or fish oil, fresh fruits and vegetables.    Psychotherapy  I agree to participate in Individual Therapy (if recommended).    Medication  If prescribed medications, I agree to take them.  Missing doses can result in serious side effects.  I understand  that drinking alcohol, or other illicit drug use, may cause potential side effects.  I will not stop my medication abruptly without first discussing it with my provider.    Staying Connected With Others  I will stay in touch with my friends, family members, and my primary care provider/team.    Use your imagination  Be creative.  We all have a creative side; it doesn t matter if it s oil painting, sand castles, or mud pies! This will also kick up the endorphins.    Witness Beauty  (AKA stop and smell the roses) Take a look outside, even in mid-winter. Notice colors, textures. Watch the squirrels and birds.     Service to others  Be of service to others.  There is always someone else in need.  By helping others we can  get out of ourselves  and remember the really important things.  This also provides opportunities for practicing all the other parts of the program.    Humor  Laugh and be silly!  Adjust your TV habits for less news and crime-drama and more comedy.    Control your stress  Try breathing deep, massage therapy, biofeedback, and meditation. Find time to relax each day.     My support system    Clinic Contact:  Phone number:    Contact 1:  Phone number:    Contact 2:  Phone number:    Hindu/:  Phone number:    Therapist:  Phone number:    Local crisis center:    Phone number:    Other community support:  Phone number:

## 2018-10-25 NOTE — PATIENT INSTRUCTIONS
[facility], [place]    If you have any questions regarding to your visit please contact your care team:       Team Red:   Clinic Hours Telephone Number   Dr. Estephania Murray, NP 7am-7pm  Monday - Thursday   7am-5pm  Fridays  [phone]  (Appointment scheduling available 24/7)   Urgent Care - [place] and [facility] - 11am-9pm Monday-Friday Saturday-Sunday- 9am-5pm   [place] - 5pm-9pm Monday-Friday Saturday-Sunday- 9am-5pm  [phone] - [place]  [phone] - [place]       What options do I have for a visit other than an office visit? We offer electronic visits (e-visits) and telephone visits, when medically appropriate.  Please check with your medical insurance to see if these types of visits are covered, as you will be responsible for any charges that are not paid by your insurance.      You can use [employer] (secure electronic communication) to access to your chart, send your primary care provider a message, or make an appointment. Ask a team member how to get started.     For a price quote for your services, please call our Consumer Price Line at [phone] or our Imaging Cost estimation line at [phone] (for imaging tests).    Discharged by Clementina COOLEY CMA ([facility])

## 2018-10-26 LAB
BACTERIA SPEC CULT: NORMAL
SPECIMEN SOURCE: NORMAL
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
## 2018-11-16 ENCOUNTER — OFFICE VISIT (OUTPATIENT)
Dept: FAMILY MEDICINE | Facility: CLINIC | Age: 48
End: 2018-11-16
Payer: COMMERCIAL

## 2018-11-16 VITALS
SYSTOLIC BLOOD PRESSURE: 112 MMHG | TEMPERATURE: 97.9 F | HEART RATE: 80 BPM | BODY MASS INDEX: 25.43 KG/M2 | OXYGEN SATURATION: 100 % | RESPIRATION RATE: 13 BRPM | WEIGHT: 167.8 LBS | HEIGHT: 68 IN | DIASTOLIC BLOOD PRESSURE: 74 MMHG

## 2018-11-16 DIAGNOSIS — F32.1 MODERATE MAJOR DEPRESSION (H): ICD-10-CM

## 2018-11-16 DIAGNOSIS — G44.219 EPISODIC TENSION-TYPE HEADACHE, NOT INTRACTABLE: ICD-10-CM

## 2018-11-16 DIAGNOSIS — M79.7 FIBROMYALGIA: ICD-10-CM

## 2018-11-16 DIAGNOSIS — B00.1 RECURRENT COLD SORES: ICD-10-CM

## 2018-11-16 DIAGNOSIS — F90.9 ATTENTION DEFICIT HYPERACTIVITY DISORDER (ADHD), UNSPECIFIED ADHD TYPE: Primary | ICD-10-CM

## 2018-11-16 PROCEDURE — 99214 OFFICE O/P EST MOD 30 MIN: CPT | Performed by: FAMILY MEDICINE

## 2018-11-16 RX ORDER — DEXTROAMPHETAMINE SACCHARATE, AMPHETAMINE ASPARTATE, DEXTROAMPHETAMINE SULFATE AND AMPHETAMINE SULFATE 5; 5; 5; 5 MG/1; MG/1; MG/1; MG/1
20 TABLET ORAL 2 TIMES DAILY
Qty: 60 TABLET | Refills: 0 | Status: SHIPPED | OUTPATIENT
Start: 2018-12-17 | End: 2019-01-16

## 2018-11-16 RX ORDER — ACYCLOVIR 50 MG/G
OINTMENT TOPICAL
Qty: 15 G | Refills: 3 | Status: SHIPPED | OUTPATIENT
Start: 2018-11-16 | End: 2018-11-26

## 2018-11-16 RX ORDER — DEXTROAMPHETAMINE SACCHARATE, AMPHETAMINE ASPARTATE, DEXTROAMPHETAMINE SULFATE AND AMPHETAMINE SULFATE 5; 5; 5; 5 MG/1; MG/1; MG/1; MG/1
20 TABLET ORAL 2 TIMES DAILY
Qty: 60 TABLET | Refills: 0 | Status: SHIPPED | OUTPATIENT
Start: 2019-01-17 | End: 2019-02-16

## 2018-11-16 RX ORDER — DEXTROAMPHETAMINE SACCHARATE, AMPHETAMINE ASPARTATE, DEXTROAMPHETAMINE SULFATE AND AMPHETAMINE SULFATE 5; 5; 5; 5 MG/1; MG/1; MG/1; MG/1
20 TABLET ORAL 2 TIMES DAILY
Qty: 60 TABLET | Refills: 0 | Status: SHIPPED | OUTPATIENT
Start: 2018-11-16 | End: 2022-02-16

## 2018-11-16 NOTE — MR AVS SNAPSHOT
After Visit Summary   11/16/2018    Marlin Lopez    MRN: [id number]           Patient Information     Date Of Birth          1970        Visit Information        Provider Department      11/16/2018 2:00 PM Zach Corona MD [facility]        Today's Diagnoses     Attention deficit hyperactivity disorder (ADHD), unspecified ADHD type    -  1    Episodic tension-type headache, not intractable        Moderate major depression (H)          Care Instructions    [facility]    If you have any questions regarding to your visit please contact your care team:       Team Purple:   Clinic Hours Telephone Number   Dr. Milagros Briones   7am-7pm  Monday - Thursday   7am-5pm  Fridays  [phone]  (Appointment scheduling available 24/7)   Urgent Care - [place] and [facility] - 11am-9pm Monday-Friday Saturday-Sunday- 9am-5pm   [place] - 5pm-9pm Monday-Friday Saturday-Sunday- 9am-5pm  [phone] - [place]  [phone] [facility]       What options do I have for a visit other than an office visit? We offer electronic visits (e-visits) and telephone visits, when medically appropriate.  Please check with your medical insurance to see if these types of visits are covered, as you will be responsible for any charges that are not paid by your insurance.      You can use [employer] (secure electronic communication) to access to your chart, send your primary care provider a message, or make an appointment. Ask a team member how to get started.     For a price quote for your services, please call our Consumer Price Line at [phone] or our Imaging Cost estimation line at [phone] (for imaging tests).    Francis Fallon            Follow-ups after your visit        Follow-up notes from your care team     Return in about 3 months (around 2/16/2019).      Your next 10 appointments already scheduled     Dec 04,  "2018  9:20 AM CST   Office Visit with Tequila Solano MD   [facility]    [address]  [facility] [address] [phone]           Bring a current list of meds and any records pertaining to this visit. For Physicals, please bring immunization records and any forms needing to be filled out. Please arrive 10 minutes early to complete paperwork.              Who to contact     If you have questions or need follow up information about today's clinic visit or your schedule please contact [facility] directly at [phone].  Normal or non-critical lab and imaging results will be communicated to you by MyChart, letter or phone within 4 business days after the clinic has received the results. If you do not hear from us within 7 days, please contact the clinic through MyChart or phone. If you have a critical or abnormal lab result, we will notify you by phone as soon as possible.  Submit refill requests through [employer] or call your pharmacy and they will forward the refill request to us. Please allow 3 business days for your refill to be completed.          Additional Information About Your Visit        Care EveryWhere ID     This is your Care EveryWhere ID. This could be used by other organizations to access your [place] medical records  [id number]        Your Vitals Were     Pulse Temperature Respirations Height Last Period Pulse Oximetry    80 97.9  F (36.6  C) (Oral) 13 5' 8\" (1.727 m) 01/03/2014 100%    Breastfeeding? BMI (Body Mass Index)                No 25.51 kg/m2           Blood Pressure from Last 3 Encounters:   11/16/18 112/74   10/25/18 136/82   09/26/18 110/60    Weight from Last 3 Encounters:   11/16/18 167 lb 12.8 oz (76.1 kg)   10/25/18 170 lb 12.8 oz (77.5 kg)   09/26/18 170 lb 8 oz (77.3 kg)              Today, you had the following     No orders found for display         Today's Medication Changes        "   These changes are accurate as of 11/16/18  2:03 PM.  If you have any questions, ask your nurse or doctor.               These medicines have changed or have updated prescriptions.        Dose/Directions    * amphetamine-dextroamphetamine 20 MG per tablet   Commonly known as:  ADDERALL   This may have changed:  Another medication with the same name was added. Make sure you understand how and when to take each.   Used for:  Attention deficit hyperactivity disorder (ADHD), unspecified ADHD type   Changed by:  Zach Corona MD        Dose:  20 mg   Take 1 tablet (20 mg) by mouth 2 times daily   Quantity:  60 tablet   Refills:  0       * amphetamine-dextroamphetamine 20 MG per tablet   Commonly known as:  ADDERALL   This may have changed:  You were already taking a medication with the same name, and this prescription was added. Make sure you understand how and when to take each.   Used for:  Attention deficit hyperactivity disorder (ADHD), unspecified ADHD type   Changed by:  Zach Corona MD        Dose:  20 mg   Take 1 tablet (20 mg) by mouth 2 times daily   Quantity:  60 tablet   Refills:  0       * amphetamine-dextroamphetamine 20 MG per tablet   Commonly known as:  ADDERALL   This may have changed:  You were already taking a medication with the same name, and this prescription was added. Make sure you understand how and when to take each.   Used for:  Attention deficit hyperactivity disorder (ADHD), unspecified ADHD type   Changed by:  Zach Corona MD        Dose:  20 mg   Start taking on:  12/17/2018   Take 1 tablet (20 mg) by mouth 2 times daily   Quantity:  60 tablet   Refills:  0       * amphetamine-dextroamphetamine 20 MG per tablet   Commonly known as:  ADDERALL   This may have changed:  You were already taking a medication with the same name, and this prescription was added. Make sure you understand how and when to take each.   Used for:  Attention deficit hyperactivity disorder  (ADHD), unspecified ADHD type   Changed by:  Zach Corona MD        Dose:  20 mg   Start taking on:  1/17/2019   Take 1 tablet (20 mg) by mouth 2 times daily   Quantity:  60 tablet   Refills:  0       * Notice:  This list has 4 medication(s) that are the same as other medications prescribed for you. Read the directions carefully, and ask your doctor or other care provider to review them with you.         Where to get your medicines      Some of these will need a paper prescription and others can be bought over the counter.  Ask your nurse if you have questions.     Bring a paper prescription for each of these medications     amphetamine-dextroamphetamine 20 MG per tablet    amphetamine-dextroamphetamine 20 MG per tablet    amphetamine-dextroamphetamine 20 MG per tablet                Primary Care Provider Office Phone # Fax #    Zach Corona [phone] [address] [facility] [address]        Equal Access to Services     [facility]: Hadii aad ku hadasho Soomaali, waaxda luqadaha, qaybta kaalmada adeegyada, waxay anain haychantelln dipti locke . So [facility] [phone].    ATENCIÓN: Si habla español, tiene a chavez disposición servicios gratuitos de asistencia lingüística. [facility] [phone].    We comply with applicable federal civil rights laws and [place] laws. We do not discriminate on the basis of race, color, national origin, age, disability, sex, sexual orientation, or gender identity.            Thank you!     Thank you for choosing [facility]  for your care. Our goal is always to provide you with excellent care. Hearing back from our patients is one way we can continue to improve our services. Please take a few minutes to complete the written survey that you may receive in the mail after your visit with us. Thank you!             Your Updated Medication List - Protect others around you: Learn how to safely use, store and throw away your  medicines at www.disposemymeds.org.          This list is accurate as of 11/16/18  2:03 PM.  Always use your most recent med list.                   Brand Name Dispense Instructions for use Diagnosis    * amphetamine-dextroamphetamine 20 MG per tablet    ADDERALL    60 tablet    Take 1 tablet (20 mg) by mouth 2 times daily    Attention deficit hyperactivity disorder (ADHD), unspecified ADHD type       * amphetamine-dextroamphetamine 20 MG per tablet    ADDERALL    60 tablet    Take 1 tablet (20 mg) by mouth 2 times daily    Attention deficit hyperactivity disorder (ADHD), unspecified ADHD type       * amphetamine-dextroamphetamine 20 MG per tablet   Start taking on:  12/17/2018    ADDERALL    60 tablet    Take 1 tablet (20 mg) by mouth 2 times daily    Attention deficit hyperactivity disorder (ADHD), unspecified ADHD type       * amphetamine-dextroamphetamine 20 MG per tablet   Start taking on:  1/17/2019    ADDERALL    60 tablet    Take 1 tablet (20 mg) by mouth 2 times daily    Attention deficit hyperactivity disorder (ADHD), unspecified ADHD type       aspirin 325 MG EC tablet      Take  by mouth daily.        cyclobenzaprine 10 MG tablet    FLEXERIL     Take 10 mg by mouth At Bedtime        diclofenac 75 MG EC tablet    VOLTAREN    20 tablet    Take 1 tablet (75 mg) by mouth 2 times daily as needed for moderate pain    Chest wall pain       fexofenadine 180 MG tablet    ALLEGRA    30 tablet    Take 1 tablet (180 mg) by mouth daily    Itchy eyes       gabapentin 300 MG capsule    NEURONTIN    90 capsule    Take 1 capsule (300 mg) by mouth 3 times daily    Fibromyalgia muscle pain       hydrOXYzine 50 MG capsule    VISTARIL    30 capsule    Take 1 capsule (50 mg) by mouth 3 times daily as needed for itching    Scalp itch       ibuprofen 800 MG tablet    ADVIL/MOTRIN    30 tablet    TAKE 1 TABLET(800 MG) BY MOUTH EVERY 8 HOURS AS NEEDED FOR MODERATE PAIN    Fibromyalgia       ketotifen 0.025 % Soln ophthalmic  solution    ZADITOR    1 Bottle    Place 1 drop into both eyes 2 times daily    Itchy eyes       metroNIDAZOLE 0.75 % vaginal gel    METROGEL    70 g    Place 1 applicator vaginally for 5 nights before bed, then use twice weekly for 4 months    BV (bacterial vaginosis)       nicotine 21 MG/24HR 24 hr patch    NICODERM CQ    30 patch    Place 1 patch onto the skin every 24 hours    Nicotine withdrawal       ondansetron 4 MG ODT tab    ZOFRAN-ODT    5 tablet    Take 1 tablet (4 mg) by mouth every 8 hours as needed for nausea        polyethylene glycol powder    MIRALAX/GLYCOLAX    527 g    MIX 17 GRAMS AS DIRECTED AND TAKE BY MOUTH EVERY DAY    Flatulence, eructation, and gas pain       traZODone 50 MG tablet    DESYREL    90 tablet    TAKE 1 TABLET(50 MG) BY MOUTH AT BEDTIME    Insomnia, unspecified type       valACYclovir 1000 mg tablet    VALTREX    20 tablet    Take 2 tablets (2,000 mg) by mouth 2 times daily    Herpes labialis       * Notice:  This list has 4 medication(s) that are the same as other medications prescribed for you. Read the directions carefully, and ask your doctor or other care provider to review them with you.

## 2018-11-16 NOTE — NURSING NOTE
"Chief Complaint   Patient presents with     Refill Request     LAB REQUEST     Order for Xray on the left foot if possible      Headache     Health Maintenance     DAP, PHQ 9     Anxiety     Depression     Initial /74 (BP Location: Left arm, Patient Position: Chair, Cuff Size: Adult Regular)  Pulse 80  Temp 97.9  F (36.6  C) (Oral)  Resp 13  Ht 5' 8\" (1.727 m)  Wt 167 lb 12.8 oz (76.1 kg)  LMP 01/03/2014  SpO2 100%  Breastfeeding? No  BMI 25.51 kg/m2 Estimated body mass index is 25.51 kg/(m^2) as calculated from the following:    Height as of this encounter: 5' 8\" (1.727 m).    Weight as of this encounter: 167 lb 12.8 oz (76.1 kg).  BP completed using cuff size: regular    Francis Fallon     "

## 2018-11-16 NOTE — PATIENT INSTRUCTIONS
[facility]    If you have any questions regarding to your visit please contact your care team:       Team Purple:   Clinic Hours Telephone Number   Dr. Milagros Briones   7am-7pm  Monday - Thursday   7am-5pm  Fridays  [phone]  (Appointment scheduling available 24/7)   Urgent Care - [place] and [facility] - 11am-9pm Monday-Friday Saturday-Sunday- 9am-5pm   [place] - 5pm-9pm Monday-Friday Saturday-Sunday- 9am-5pm  [phone] - [place]  [phone] - [place]       What options do I have for a visit other than an office visit? We offer electronic visits (e-visits) and telephone visits, when medically appropriate.  Please check with your medical insurance to see if these types of visits are covered, as you will be responsible for any charges that are not paid by your insurance.      You can use [employer] (secure electronic communication) to access to your chart, send your primary care provider a message, or make an appointment. Ask a team member how to get started.     For a price quote for your services, please call our Consumer Price Line at [phone] or our Imaging Cost estimation line at [phone] (for imaging tests).    Francis Fallon

## 2018-11-26 DIAGNOSIS — B00.1 RECURRENT COLD SORES: ICD-10-CM

## 2018-11-26 NOTE — TELEPHONE ENCOUNTER
Received fax from pharmacy, plan does not cover this medication.    .Prior Authorization Retail Medication Request    Medication/Dose:   ICD code (if different than what is on RX):  acyclovir (ZOVIRAX) 5 % ointment  Previously Tried and Failed:    Rationale:      Insurance Name:  [employer] [place]   Insurance ID:  [id number]      Pharmacy Information (if different than what is on RX)  Name:  [facility]  Phone:  [phone]

## 2018-11-28 RX ORDER — ACYCLOVIR 50 MG/G
OINTMENT TOPICAL
Qty: 15 G | Refills: 3 | Status: SHIPPED | OUTPATIENT
Start: 2018-11-28 | End: 2019-06-11

## 2018-12-07 DIAGNOSIS — B00.1 HERPES LABIALIS: ICD-10-CM

## 2018-12-10 RX ORDER — VALACYCLOVIR HYDROCHLORIDE 1 G/1
TABLET, FILM COATED ORAL
Qty: 20 TABLET | Refills: 0 | Status: SHIPPED | OUTPATIENT
Start: 2018-12-10 | End: 2019-03-04

## 2019-01-02 ENCOUNTER — TELEPHONE (OUTPATIENT)
Dept: FAMILY MEDICINE | Facility: CLINIC | Age: 49
End: 2019-01-02

## 2019-01-02 DIAGNOSIS — N76.0 BV (BACTERIAL VAGINOSIS): ICD-10-CM

## 2019-01-02 DIAGNOSIS — B96.89 BV (BACTERIAL VAGINOSIS): ICD-10-CM

## 2019-01-04 RX ORDER — METRONIDAZOLE 500 MG/1
TABLET ORAL
Qty: 14 TABLET | Refills: 0 | OUTPATIENT
Start: 2019-01-04

## 2019-01-04 NOTE — TELEPHONE ENCOUNTER
Called patient and left VM to call clinic. Okay to speak to anyone on red team.  Clementina COOLEY CMA ([facility])

## 2019-01-04 NOTE — TELEPHONE ENCOUNTER
This was last prescribed for BV  If she is having symptoms, this requires a visit for further eval and possible testing  Can do OV or Evisit with provider    Justyn Hayes RN

## 2019-01-09 NOTE — TELEPHONE ENCOUNTER
Left message for patient to return call if she has any questions prior to visit tomorrow. Angelica Harper MA

## 2019-01-15 ENCOUNTER — TRANSFERRED RECORDS (OUTPATIENT)
Dept: HEALTH INFORMATION MANAGEMENT | Facility: CLINIC | Age: 49
End: 2019-01-15

## 2019-01-15 LAB
ALT SERPL-CCNC: 28 U/L (ref 14–63)
AST SERPL-CCNC: 16 U/L (ref 15–37)
CREAT SERPL-MCNC: 0.67 MG/DL (ref 0.51–0.95)
GFR SERPL CREATININE-BSD FRML MDRD: >60 ML/MIN/1.73ME2 (ref 60–150)
GLUCOSE SERPL-MCNC: 101 MG/DL (ref 74–100)
POTASSIUM SERPL-SCNC: 4 MMOL/L (ref 3.5–5.1)

## 2019-02-06 DIAGNOSIS — G47.00 INSOMNIA, UNSPECIFIED TYPE: ICD-10-CM

## 2019-02-07 RX ORDER — TRAZODONE HYDROCHLORIDE 50 MG/1
TABLET, FILM COATED ORAL
Qty: 90 TABLET | Refills: 0 | Status: SHIPPED | OUTPATIENT
Start: 2019-02-07 | End: 2019-03-15

## 2019-02-11 ENCOUNTER — TRANSFERRED RECORDS (OUTPATIENT)
Dept: HEALTH INFORMATION MANAGEMENT | Facility: CLINIC | Age: 49
End: 2019-02-11

## 2019-03-04 DIAGNOSIS — B00.1 HERPES LABIALIS: ICD-10-CM

## 2019-03-04 RX ORDER — VALACYCLOVIR HYDROCHLORIDE 1 G/1
TABLET, FILM COATED ORAL
Qty: 20 TABLET | Refills: 3 | Status: SHIPPED | OUTPATIENT
Start: 2019-03-04 | End: 2019-09-15

## 2019-03-14 ENCOUNTER — TRANSFERRED RECORDS (OUTPATIENT)
Dept: HEALTH INFORMATION MANAGEMENT | Facility: CLINIC | Age: 49
End: 2019-03-14

## 2019-03-14 NOTE — PROGRESS NOTES
SUBJECTIVE:   Marlin Lopez is a 49 year old female who presents to clinic today for the following health issues:    Chief Complaint   Patient presents with     Recheck Medication     Patient Request     Glucose check     Vaginal Problem     Possible infection     Patient requests medication and glucose check; had blood work done and was his.  ADHD;     ADHD Follow-Up    Date of last ADHD office visit: 11/16/2018  Status since last visit: Stable  Taking controlled (daily) medications as prescribed: Yes                       Parent/Patient Concerns with Medications: None  ADHD Medication     Amphetamines Disp Start End     amphetamine-dextroamphetamine (ADDERALL) 20 MG tablet    60 tablet 3/15/2019 4/14/2019    Sig - Route: Take 1 tablet (20 mg) by mouth 2 times daily - Oral    Class: Local Print    Earliest Fill Date: 3/15/2019     amphetamine-dextroamphetamine (ADDERALL) 20 MG tablet    60 tablet 4/15/2019 5/15/2019    Sig - Route: Take 1 tablet (20 mg) by mouth 2 times daily - Oral    Class: Local Print    Earliest Fill Date: 4/15/2019     amphetamine-dextroamphetamine (ADDERALL) 20 MG tablet    60 tablet 5/15/2019 6/14/2019    Sig - Route: Take 1 tablet (20 mg) by mouth 2 times daily - Oral    Class: Local Print    Earliest Fill Date: 5/15/2019     amphetamine-dextroamphetamine (ADDERALL) 20 MG per tablet     60 tablet 11/16/2018 12/16/2018    Sig - Route: Take 1 tablet (20 mg) by mouth 2 times daily - Oral    Class: Local Print    Earliest Fill Date: 11/16/2018     amphetamine-dextroamphetamine (ADDERALL) 20 MG per tablet     60 tablet 12/17/2018 1/16/2019    Sig - Route: Take 1 tablet (20 mg) by mouth 2 times daily - Oral    Class: Local Print    Earliest Fill Date: 12/17/2018     amphetamine-dextroamphetamine (ADDERALL) 20 MG per tablet     60 tablet 1/17/2019 2/16/2019    Sig - Route: Take 1 tablet (20 mg) by mouth 2 times daily - Oral    Class: Local Print    Earliest Fill Date: 1/17/2019         Medication Benefits:   Controlled symptoms: Attention span, Distractability, Finishing tasks and Impulse control  Uncontrolled Symptoms: None    Medication side effects:  Side effects noted: none    Fibromyalgia:     Continue to experience pain and fatigue     Gabapentin does help and would like a refill.    Insomnia    This is an on going issue, and uses Trazodone.     Thinks her pain issues contribute significantly to this.    Depression; Major:    Does take Hydroxyzine as needed for anxiety.    Been taking Duloxetine and Buspar; prescribed by another provider.    BV  Patient informs possible BV and requesting testing done    Weight:   Been eating healthy but weight not coming down  Wt Readings from Last 4 Encounters:   03/15/19 76.6 kg (168 lb 12.8 oz)   11/16/18 76.1 kg (167 lb 12.8 oz)   10/25/18 77.5 kg (170 lb 12.8 oz)   09/26/18 77.3 kg (170 lb 8 oz)     Problem list and histories reviewed & adjusted, as indicated.  Additional history: as documented    Patient Active Problem List   Diagnosis     Anxiety state     Fibromyalgia     Sleep problems     Recurrent cold sores     Moderate major depression (H)     ADHD (attention deficit hyperactivity disorder)     CARDIOVASCULAR SCREENING; LDL GOAL LESS THAN 130     Family history of colon cancer     Allergic rhinitis     Renal cyst, left     Chronic constipation     Diverticulosis of large intestine without hemorrhage     Ulceration of colon determined by endoscopy     Past Surgical History:   Procedure Laterality Date     C REMOVAL OF OVARY(S)  1992    Left, cyst     TONSILLECTOMY  1979       Social History     Tobacco Use     Smoking status: Former Smoker     Packs/day: 0.50     Types: Cigarettes     Start date: 1/1/1980     Last attempt to quit: 9/20/2015     Years since quitting: 3.4     Smokeless tobacco: Never Used     Tobacco comment:  using e-cig daily   Substance Use Topics     Alcohol use: Yes     Alcohol/week: 0.0 oz     Comment:  drinks 3 days in a  week, 2 beers each time      Family History   Problem Relation Age of Onset     Hypertension Maternal Grandmother      Cerebrovascular Disease Maternal Grandmother      Alzheimer Disease Maternal Grandmother      Arthritis Maternal Grandmother      Obesity Maternal Grandmother      Cancer Other         Colon     Breast Cancer Other      Cancer - colorectal Paternal Grandfather          Tobacco  Allergies  Meds       Reviewed and updated as needed this visit by Provider    ROS:  HEENT, cardiovascular, pulmonary, , musculoskeletal, neuro, skin, endocrine and psych systems are negative, except as otherwise noted.    OBJECTIVE:     /86   Pulse 95   Temp 97.1  F (36.2  C) (Oral)   Resp 18   Wt 76.6 kg (168 lb 12.8 oz)   LMP 01/03/2014   SpO2 99%   BMI 25.67 kg/m    Body mass index is 25.67 kg/m .  GENERAL: healthy, alert and no distress  NECK: no adenopathy and thyroid normal to palpation  RESP: lungs clear to auscultation - no rales, rhonchi or wheezes  CV: regular rate and rhythm, normal S1 S2, no S3 or S4, no murmur, click or rub   ABDOMEN: soft, nontender, no masses and bowel sounds normal.  MS: no gross musculoskeletal defects noted, no edema  PSYCH: Alert, mentation and speech normal, affect flat.    ASSESSMENT/PLAN:     (F90.9) Attention deficit hyperactivity disorder (ADHD), unspecified ADHD type  (primary encounter diagnosis)  Comment: Symptoms well controlled. Refill medications.  Plan: Urine Microscopic,         amphetamine-dextroamphetamine (ADDERALL) 20 MG         tablet, amphetamine-dextroamphetamine         (ADDERALL) 20 MG tablet,         amphetamine-dextroamphetamine (ADDERALL) 20 MG         tablet    (M79.7) Fibromyalgia muscle pain  Comment: On going issue and been stable. Refill medication.  Plan: gabapentin (NEURONTIN) 300 MG capsule    (G47.00) Insomnia, unspecified type  Comment:   Plan: traZODone (DESYREL) 50 MG tablet    (F32.1) Moderate major depression (H)  Comment: Been  getting Duloxetine and Buspar from another provider.    (R30.0) Dysuria  Comment: urine unremarkable  Plan: UA reflex to Microscopic and Culture    (N89.8) Vaginal discharge  Comment: Negative wet prep.  Plan: Wet prep    (R68.89) Itchy eyes  Comment: Zaditor  Plan: fexofenadine (ALLEGRA) 180 MG tablet, ketotifen        (ZADITOR) 0.025 % ophthalmic solution     Follow up in 3 months  or sooner with concerns    Zach Corona MD  [facility]

## 2019-03-15 ENCOUNTER — OFFICE VISIT (OUTPATIENT)
Dept: FAMILY MEDICINE | Facility: CLINIC | Age: 49
End: 2019-03-15
Payer: COMMERCIAL

## 2019-03-15 VITALS
TEMPERATURE: 97.1 F | HEART RATE: 95 BPM | BODY MASS INDEX: 25.67 KG/M2 | OXYGEN SATURATION: 99 % | WEIGHT: 168.8 LBS | SYSTOLIC BLOOD PRESSURE: 132 MMHG | DIASTOLIC BLOOD PRESSURE: 86 MMHG | RESPIRATION RATE: 18 BRPM

## 2019-03-15 DIAGNOSIS — H57.9 ITCHY EYES: ICD-10-CM

## 2019-03-15 DIAGNOSIS — G47.00 INSOMNIA, UNSPECIFIED TYPE: ICD-10-CM

## 2019-03-15 DIAGNOSIS — F17.203 NICOTINE WITHDRAWAL: ICD-10-CM

## 2019-03-15 DIAGNOSIS — F90.9 ATTENTION DEFICIT HYPERACTIVITY DISORDER (ADHD), UNSPECIFIED ADHD TYPE: Primary | ICD-10-CM

## 2019-03-15 DIAGNOSIS — N89.8 VAGINAL DISCHARGE: ICD-10-CM

## 2019-03-15 DIAGNOSIS — F32.1 MODERATE MAJOR DEPRESSION (H): ICD-10-CM

## 2019-03-15 DIAGNOSIS — M79.7 FIBROMYALGIA MUSCLE PAIN: ICD-10-CM

## 2019-03-15 DIAGNOSIS — R30.0 DYSURIA: ICD-10-CM

## 2019-03-15 LAB
ALBUMIN UR-MCNC: NEGATIVE MG/DL
APPEARANCE UR: CLEAR
BILIRUB UR QL STRIP: NEGATIVE
COLOR UR AUTO: YELLOW
GLUCOSE UR STRIP-MCNC: NEGATIVE MG/DL
HGB UR QL STRIP: ABNORMAL
KETONES UR STRIP-MCNC: NEGATIVE MG/DL
LEUKOCYTE ESTERASE UR QL STRIP: NEGATIVE
NITRATE UR QL: NEGATIVE
NON-SQ EPI CELLS #/AREA URNS LPF: NORMAL /LPF
PH UR STRIP: 6 PH (ref 5–7)
RBC #/AREA URNS AUTO: NORMAL /HPF
SOURCE: ABNORMAL
SP GR UR STRIP: <=1.005 (ref 1–1.03)
SPECIMEN SOURCE: NORMAL
UROBILINOGEN UR STRIP-ACNC: 0.2 EU/DL (ref 0.2–1)
WBC #/AREA URNS AUTO: NORMAL /HPF
WET PREP SPEC: NORMAL

## 2019-03-15 PROCEDURE — 81001 URINALYSIS AUTO W/SCOPE: CPT | Performed by: FAMILY MEDICINE

## 2019-03-15 PROCEDURE — 99214 OFFICE O/P EST MOD 30 MIN: CPT | Performed by: FAMILY MEDICINE

## 2019-03-15 PROCEDURE — 87210 SMEAR WET MOUNT SALINE/INK: CPT | Performed by: FAMILY MEDICINE

## 2019-03-15 RX ORDER — DEXTROAMPHETAMINE SACCHARATE, AMPHETAMINE ASPARTATE, DEXTROAMPHETAMINE SULFATE AND AMPHETAMINE SULFATE 5; 5; 5; 5 MG/1; MG/1; MG/1; MG/1
20 TABLET ORAL 2 TIMES DAILY
Qty: 60 TABLET | Refills: 0 | Status: SHIPPED | OUTPATIENT
Start: 2019-03-15 | End: 2019-04-14

## 2019-03-15 RX ORDER — FEXOFENADINE HCL 180 MG/1
180 TABLET ORAL DAILY
Qty: 30 TABLET | Refills: 1 | Status: SHIPPED | OUTPATIENT
Start: 2019-03-15 | End: 2019-09-15

## 2019-03-15 RX ORDER — DEXTROAMPHETAMINE SACCHARATE, AMPHETAMINE ASPARTATE, DEXTROAMPHETAMINE SULFATE AND AMPHETAMINE SULFATE 5; 5; 5; 5 MG/1; MG/1; MG/1; MG/1
20 TABLET ORAL 2 TIMES DAILY
Qty: 60 TABLET | Refills: 0 | Status: CANCELLED | OUTPATIENT
Start: 2019-03-15

## 2019-03-15 RX ORDER — DEXTROAMPHETAMINE SACCHARATE, AMPHETAMINE ASPARTATE, DEXTROAMPHETAMINE SULFATE AND AMPHETAMINE SULFATE 5; 5; 5; 5 MG/1; MG/1; MG/1; MG/1
20 TABLET ORAL 2 TIMES DAILY
Qty: 60 TABLET | Refills: 0 | Status: SHIPPED | OUTPATIENT
Start: 2019-05-15 | End: 2019-06-14

## 2019-03-15 RX ORDER — NICOTINE 21 MG/24HR
1 PATCH, TRANSDERMAL 24 HOURS TRANSDERMAL EVERY 24 HOURS
Qty: 30 PATCH | Refills: 4 | Status: CANCELLED | OUTPATIENT
Start: 2019-03-15

## 2019-03-15 RX ORDER — GABAPENTIN 300 MG/1
300 CAPSULE ORAL 3 TIMES DAILY
Qty: 90 CAPSULE | Refills: 2 | Status: SHIPPED | OUTPATIENT
Start: 2019-03-15 | End: 2019-06-25

## 2019-03-15 RX ORDER — DEXTROAMPHETAMINE SACCHARATE, AMPHETAMINE ASPARTATE, DEXTROAMPHETAMINE SULFATE AND AMPHETAMINE SULFATE 5; 5; 5; 5 MG/1; MG/1; MG/1; MG/1
20 TABLET ORAL 2 TIMES DAILY
Qty: 60 TABLET | Refills: 0 | Status: SHIPPED | OUTPATIENT
Start: 2019-04-15 | End: 2019-05-15

## 2019-03-15 RX ORDER — TRAZODONE HYDROCHLORIDE 50 MG/1
TABLET, FILM COATED ORAL
Qty: 90 TABLET | Refills: 0 | Status: SHIPPED | OUTPATIENT
Start: 2019-03-15 | End: 2019-09-15

## 2019-04-11 ENCOUNTER — TRANSFERRED RECORDS (OUTPATIENT)
Dept: HEALTH INFORMATION MANAGEMENT | Facility: CLINIC | Age: 49
End: 2019-04-11

## 2019-04-12 ENCOUNTER — TRANSFERRED RECORDS (OUTPATIENT)
Dept: HEALTH INFORMATION MANAGEMENT | Facility: CLINIC | Age: 49
End: 2019-04-12

## 2019-05-06 ENCOUNTER — TELEPHONE (OUTPATIENT)
Dept: FAMILY MEDICINE | Facility: CLINIC | Age: 49
End: 2019-05-06

## 2019-05-06 DIAGNOSIS — R14.0 BLOATING: Primary | ICD-10-CM

## 2019-05-06 DIAGNOSIS — Z87.42 HISTORY OF OVARIAN CYST: ICD-10-CM

## 2019-05-06 NOTE — TELEPHONE ENCOUNTER
"Patient would like order for US to abdominal discomfort that she has had for a week.  Denies pain but reports there is discomfort and bloating in abdomen.  Patient states \"it looks like im pregnant\" and asked for US order to check for cysts she has had in the past.  Advised patient she should schedule a visit to be seen and order can be placed.  Patient reports that provider knows her history very well and she lives 45 min away so she was hoping the US order could be placed and she could have it done the same day as her physical.  She has not scheduled a physical but knows she needs to have this done in June.  Okay to leave a detailed nic Rodriguez RN    "

## 2019-05-06 NOTE — TELEPHONE ENCOUNTER
Reason for call: Stomach issues wants ultra sound ordered and discuss physical.  Symptom or request: Stomach discomfort    Duration (how long have symptoms been present): 1 week  Have you been treated for this before? No    Additional comments: Patient would like to have an order for an ultrasound. States her stomach is bloated. She is not going to the bathroom as normal. Please call.     Phone number to reach patient:  Home number on file [phone] (home)    Best Time:  any    Can we leave a detailed message on this number?  YES

## 2019-05-07 NOTE — TELEPHONE ENCOUNTER
"Called and spoke with patient and gave information below.   Patient states that she lives in hour away and it's hard for her to come for an appointment and then have to make an additional appointment for imaging if she can't get in on the same day.   Patient states she is due for her annual physical and wants to get her thyroid checked too. Advised patient she can come in for physical and have abdominal symptoms assessed and if needed, same-day imaging can be ordered/completed.   Patient states \"I don't know why you're making this so difficult, I have had cysts in the past and just want imaging completed\".   Advised patient again an office visit is needed to determine the correct imaging that should be ordered - patient said \"I've told my symptoms and I don't get how sitting in an office is going determine imaging differently\".   Patient requesting message be sent back to Dr. Corona and would like a call back if possible.   Patient did not schedule appointment.     Lili Guerra RN  "

## 2019-05-07 NOTE — TELEPHONE ENCOUNTER
Patient needs to be evaluated of course I know she has a cyst but also has diverticulosis and could be something else; evalaution will determine which imaging will be appropriate.

## 2019-05-16 DIAGNOSIS — G47.00 INSOMNIA, UNSPECIFIED TYPE: ICD-10-CM

## 2019-05-17 RX ORDER — TRAZODONE HYDROCHLORIDE 50 MG/1
TABLET, FILM COATED ORAL
Qty: 90 TABLET | Refills: 0 | Status: SHIPPED | OUTPATIENT
Start: 2019-05-17 | End: 2019-08-26

## 2019-05-20 ENCOUNTER — TRANSFERRED RECORDS (OUTPATIENT)
Dept: HEALTH INFORMATION MANAGEMENT | Facility: CLINIC | Age: 49
End: 2019-05-20

## 2019-06-07 ENCOUNTER — TRANSFERRED RECORDS (OUTPATIENT)
Dept: HEALTH INFORMATION MANAGEMENT | Facility: CLINIC | Age: 49
End: 2019-06-07

## 2019-06-11 ENCOUNTER — OFFICE VISIT (OUTPATIENT)
Dept: FAMILY MEDICINE | Facility: CLINIC | Age: 49
End: 2019-06-11
Payer: COMMERCIAL

## 2019-06-11 VITALS
HEART RATE: 91 BPM | DIASTOLIC BLOOD PRESSURE: 86 MMHG | BODY MASS INDEX: 24.55 KG/M2 | RESPIRATION RATE: 16 BRPM | TEMPERATURE: 97.3 F | OXYGEN SATURATION: 100 % | WEIGHT: 171.5 LBS | SYSTOLIC BLOOD PRESSURE: 132 MMHG | HEIGHT: 70 IN

## 2019-06-11 DIAGNOSIS — Z00.00 ROUTINE HISTORY AND PHYSICAL EXAMINATION OF ADULT: ICD-10-CM

## 2019-06-11 DIAGNOSIS — F32.4 MAJOR DEPRESSIVE DISORDER IN PARTIAL REMISSION, UNSPECIFIED WHETHER RECURRENT (H): Primary | ICD-10-CM

## 2019-06-11 DIAGNOSIS — R82.90 NONSPECIFIC FINDING ON EXAMINATION OF URINE: ICD-10-CM

## 2019-06-11 DIAGNOSIS — F90.0 ATTENTION DEFICIT HYPERACTIVITY DISORDER (ADHD), PREDOMINANTLY INATTENTIVE TYPE: ICD-10-CM

## 2019-06-11 DIAGNOSIS — Z11.3 SCREEN FOR STD (SEXUALLY TRANSMITTED DISEASE): ICD-10-CM

## 2019-06-11 DIAGNOSIS — B00.1 RECURRENT COLD SORES: ICD-10-CM

## 2019-06-11 LAB
ALBUMIN UR-MCNC: NEGATIVE MG/DL
APPEARANCE UR: CLEAR
BILIRUB UR QL STRIP: NEGATIVE
CHOLEST SERPL-MCNC: 186 MG/DL
COLOR UR AUTO: YELLOW
GLUCOSE SERPL-MCNC: 83 MG/DL (ref 70–99)
GLUCOSE UR STRIP-MCNC: NEGATIVE MG/DL
HDLC SERPL-MCNC: 73 MG/DL
HGB UR QL STRIP: ABNORMAL
KETONES UR STRIP-MCNC: NEGATIVE MG/DL
LDLC SERPL CALC-MCNC: 105 MG/DL
LEUKOCYTE ESTERASE UR QL STRIP: ABNORMAL
NITRATE UR QL: NEGATIVE
NON-SQ EPI CELLS #/AREA URNS LPF: ABNORMAL /LPF
NONHDLC SERPL-MCNC: 113 MG/DL
PH UR STRIP: 5.5 PH (ref 5–7)
RBC #/AREA URNS AUTO: ABNORMAL /HPF
SOURCE: ABNORMAL
SP GR UR STRIP: <=1.005 (ref 1–1.03)
SPECIMEN SOURCE: NORMAL
TRIGL SERPL-MCNC: 41 MG/DL
TSH SERPL DL<=0.005 MIU/L-ACNC: 0.87 MU/L (ref 0.4–4)
UROBILINOGEN UR STRIP-ACNC: 0.2 EU/DL (ref 0.2–1)
WBC #/AREA URNS AUTO: ABNORMAL /HPF
WET PREP SPEC: NORMAL

## 2019-06-11 PROCEDURE — 84443 ASSAY THYROID STIM HORMONE: CPT | Performed by: FAMILY MEDICINE

## 2019-06-11 PROCEDURE — 36415 COLL VENOUS BLD VENIPUNCTURE: CPT | Performed by: FAMILY MEDICINE

## 2019-06-11 PROCEDURE — 87088 URINE BACTERIA CULTURE: CPT | Performed by: FAMILY MEDICINE

## 2019-06-11 PROCEDURE — 81001 URINALYSIS AUTO W/SCOPE: CPT | Performed by: FAMILY MEDICINE

## 2019-06-11 PROCEDURE — 87086 URINE CULTURE/COLONY COUNT: CPT | Performed by: FAMILY MEDICINE

## 2019-06-11 PROCEDURE — 87210 SMEAR WET MOUNT SALINE/INK: CPT | Performed by: FAMILY MEDICINE

## 2019-06-11 PROCEDURE — 80061 LIPID PANEL: CPT | Performed by: FAMILY MEDICINE

## 2019-06-11 PROCEDURE — 99213 OFFICE O/P EST LOW 20 MIN: CPT | Mod: 25 | Performed by: FAMILY MEDICINE

## 2019-06-11 PROCEDURE — 82947 ASSAY GLUCOSE BLOOD QUANT: CPT | Performed by: FAMILY MEDICINE

## 2019-06-11 PROCEDURE — 99396 PREV VISIT EST AGE 40-64: CPT | Performed by: FAMILY MEDICINE

## 2019-06-11 RX ORDER — DEXTROAMPHETAMINE SACCHARATE, AMPHETAMINE ASPARTATE, DEXTROAMPHETAMINE SULFATE AND AMPHETAMINE SULFATE 5; 5; 5; 5 MG/1; MG/1; MG/1; MG/1
20 TABLET ORAL 2 TIMES DAILY
Qty: 60 TABLET | Refills: 0 | Status: SHIPPED | OUTPATIENT
Start: 2019-08-15 | End: 2020-03-11

## 2019-06-11 RX ORDER — ACYCLOVIR 50 MG/G
OINTMENT TOPICAL
Qty: 15 G | Refills: 3 | Status: SHIPPED | OUTPATIENT
Start: 2019-06-11 | End: 2019-06-21

## 2019-06-11 RX ORDER — DEXTROAMPHETAMINE SACCHARATE, AMPHETAMINE ASPARTATE, DEXTROAMPHETAMINE SULFATE AND AMPHETAMINE SULFATE 5; 5; 5; 5 MG/1; MG/1; MG/1; MG/1
20 TABLET ORAL 2 TIMES DAILY
Qty: 60 TABLET | Refills: 0 | Status: SHIPPED | OUTPATIENT
Start: 2019-06-15 | End: 2019-07-15

## 2019-06-11 RX ORDER — DEXTROAMPHETAMINE SACCHARATE, AMPHETAMINE ASPARTATE, DEXTROAMPHETAMINE SULFATE AND AMPHETAMINE SULFATE 5; 5; 5; 5 MG/1; MG/1; MG/1; MG/1
20 TABLET ORAL 2 TIMES DAILY
Qty: 60 TABLET | Refills: 0 | Status: SHIPPED | OUTPATIENT
Start: 2019-07-15 | End: 2019-08-14

## 2019-06-11 ASSESSMENT — ANXIETY QUESTIONNAIRES
2. NOT BEING ABLE TO STOP OR CONTROL WORRYING: SEVERAL DAYS
5. BEING SO RESTLESS THAT IT IS HARD TO SIT STILL: NOT AT ALL
6. BECOMING EASILY ANNOYED OR IRRITABLE: NOT AT ALL
3. WORRYING TOO MUCH ABOUT DIFFERENT THINGS: SEVERAL DAYS
1. FEELING NERVOUS, ANXIOUS, OR ON EDGE: NOT AT ALL
GAD7 TOTAL SCORE: 3
7. FEELING AFRAID AS IF SOMETHING AWFUL MIGHT HAPPEN: NOT AT ALL

## 2019-06-11 ASSESSMENT — MIFFLIN-ST. JEOR: SCORE: 1476.3

## 2019-06-11 ASSESSMENT — PATIENT HEALTH QUESTIONNAIRE - PHQ9
SUM OF ALL RESPONSES TO PHQ QUESTIONS 1-9: 7
5. POOR APPETITE OR OVEREATING: SEVERAL DAYS

## 2019-06-11 NOTE — LETTER
[address] [place] [address]    June 14, 2019    Marlin Lopez  [facility] [place] [address]          Dear Marlin,    Enclosed is a copy of your results.essentially normal results.     Results for orders placed or performed in visit on 06/11/19   Lipid Profile (Chol, Trig, HDL, LDL calc)   Result Value Ref Range    Cholesterol 186 <200 mg/dL    Triglycerides 41 <150 mg/dL    HDL Cholesterol 73 >49 mg/dL    LDL Cholesterol Calculated 105 (H) <100 mg/dL    Non HDL Cholesterol 113 <130 mg/dL   TSH with free T4 reflex   Result Value Ref Range    TSH 0.87 0.40 - 4.00 mU/L   Glucose   Result Value Ref Range    Glucose 83 70 - 99 mg/dL   UA reflex to Microscopic and Culture   Result Value Ref Range    Color Urine Yellow     Appearance Urine Clear     Glucose Urine Negative NEG^Negative mg/dL    Bilirubin Urine Negative NEG^Negative    Ketones Urine Negative NEG^Negative mg/dL    Specific Gravity Urine <=1.005 1.003 - 1.035    Blood Urine Trace (A) NEG^Negative    pH Urine 5.5 5.0 - 7.0 pH    Protein Albumin Urine Negative NEG^Negative mg/dL    Urobilinogen Urine 0.2 0.2 - 1.0 EU/dL    Nitrite Urine Negative NEG^Negative    Leukocyte Esterase Urine Large (A) NEG^Negative    Source Midstream Urine    Urine Microscopic   Result Value Ref Range    WBC Urine 5-10 (A) OTO5^0 - 5 /HPF    RBC Urine 2-5 (A) OTO2^O - 2 /HPF    Squamous Epithelial /LPF Urine Few FEW^Few /LPF   Wet prep   Result Value Ref Range    Specimen Description Vagina     Wet Prep No Trichomonas seen     Wet Prep No clue cells seen     Wet Prep No yeast seen     Wet Prep WBC'S seen     Wet Prep Few    Urine Culture Aerobic Bacterial   Result Value Ref Range    Specimen Description Midstream Urine     Culture Micro (A)      <10,000 colonies/mL  Streptococcus agalactiae sero group B  Beta Hemolytic Streptococcus groups A and B are susceptible to  ampicillin, penicillin,   vancomycin, and the cephalosporins.  Susceptibility testing is not routinely done on these   organisms isolated from urine.      Culture Micro       Plus  <10,000 colonies/mL  mixed urogenital brenton         If you have any questions or concerns, please me or my clinic team at [phone].      Sincerely,        Zach Corona MD/bt

## 2019-06-12 ASSESSMENT — ANXIETY QUESTIONNAIRES: GAD7 TOTAL SCORE: 3

## 2019-06-13 LAB
BACTERIA SPEC CULT: ABNORMAL
BACTERIA SPEC CULT: ABNORMAL
SPECIMEN SOURCE: ABNORMAL

## 2019-06-14 ENCOUNTER — TRANSFERRED RECORDS (OUTPATIENT)
Dept: HEALTH INFORMATION MANAGEMENT | Facility: CLINIC | Age: 49
End: 2019-06-14

## 2019-06-17 ENCOUNTER — TELEPHONE (OUTPATIENT)
Dept: FAMILY MEDICINE | Facility: CLINIC | Age: 49
End: 2019-06-17

## 2019-06-17 DIAGNOSIS — B00.1 RECURRENT COLD SORES: ICD-10-CM

## 2019-06-17 NOTE — TELEPHONE ENCOUNTER
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

## 2019-06-19 NOTE — TELEPHONE ENCOUNTER
PRIOR AUTHORIZATION DENIED    Medication: acyclovir (ZOVIRAX) 5 % external ointment- DENIED    Denial Date: 6/19/2019    Denial Rational: Diagnosis is not an FDA approved condition for the medication.            Appeal Information: If provider would like to appeal we will need a detailed letter of medical necessity to start the process. Then re-route this request back to the PA pool.

## 2019-06-19 NOTE — TELEPHONE ENCOUNTER
PA Initiation    Medication: acyclovir (ZOVIRAX) 5 % external ointment- INITIATED  Insurance Company: [employer] [place] - Phone [phone] Fax [phone]  Pharmacy Filling the Rx: [facility] [id number] - [place] - [facility] [address]   Filling Pharmacy Phone: [phone]  Filling Pharmacy Fax:    Start Date: 6/19/2019

## 2019-06-20 NOTE — TELEPHONE ENCOUNTER
PA was Denied would you like to do an Appeal, change medication or patient pay out of pocket.  Sharon Geronimo,

## 2019-06-21 RX ORDER — ACYCLOVIR 50 MG/G
OINTMENT TOPICAL
Qty: 15 G | Refills: 3 | Status: SHIPPED | OUTPATIENT
Start: 2019-06-21 | End: 2019-06-21

## 2019-06-26 RX ORDER — ACYCLOVIR 50 MG/G
CREAM TOPICAL
Qty: 5 G | Refills: 3 | Status: SHIPPED | OUTPATIENT
Start: 2019-06-26 | End: 2020-04-10

## 2019-08-20 ENCOUNTER — TRANSFERRED RECORDS (OUTPATIENT)
Dept: HEALTH INFORMATION MANAGEMENT | Facility: CLINIC | Age: 49
End: 2019-08-20

## 2019-08-20 LAB
C TRACH DNA SPEC QL PROBE+SIG AMP: NORMAL
N GONORRHOEA DNA SPEC QL PROBE+SIG AMP: NORMAL
SPECIMEN DESCRIP: NORMAL
SPECIMEN DESCRIPTION: NORMAL

## 2019-08-21 LAB — HEP C HIM: NORMAL

## 2019-08-22 ENCOUNTER — TRANSFERRED RECORDS (OUTPATIENT)
Dept: HEALTH INFORMATION MANAGEMENT | Facility: CLINIC | Age: 49
End: 2019-08-22

## 2019-08-26 DIAGNOSIS — G47.00 INSOMNIA, UNSPECIFIED TYPE: ICD-10-CM

## 2019-08-27 RX ORDER — TRAZODONE HYDROCHLORIDE 50 MG/1
TABLET, FILM COATED ORAL
Qty: 90 TABLET | Refills: 0 | Status: SHIPPED | OUTPATIENT
Start: 2019-08-27 | End: 2019-09-13

## 2019-09-13 ENCOUNTER — OFFICE VISIT (OUTPATIENT)
Dept: FAMILY MEDICINE | Facility: CLINIC | Age: 49
End: 2019-09-13
Payer: COMMERCIAL

## 2019-09-13 VITALS
TEMPERATURE: 98.2 F | BODY MASS INDEX: 23.62 KG/M2 | WEIGHT: 165 LBS | DIASTOLIC BLOOD PRESSURE: 78 MMHG | HEART RATE: 105 BPM | SYSTOLIC BLOOD PRESSURE: 116 MMHG | HEIGHT: 70 IN | OXYGEN SATURATION: 98 %

## 2019-09-13 DIAGNOSIS — Z23 NEED FOR PROPHYLACTIC VACCINATION AND INOCULATION AGAINST INFLUENZA: ICD-10-CM

## 2019-09-13 DIAGNOSIS — R06.2 WHEEZING: ICD-10-CM

## 2019-09-13 DIAGNOSIS — B96.89 BV (BACTERIAL VAGINOSIS): ICD-10-CM

## 2019-09-13 DIAGNOSIS — H57.9 ITCHY EYES: ICD-10-CM

## 2019-09-13 DIAGNOSIS — G47.00 INSOMNIA, UNSPECIFIED TYPE: ICD-10-CM

## 2019-09-13 DIAGNOSIS — L29.9 SCALP ITCH: ICD-10-CM

## 2019-09-13 DIAGNOSIS — Z72.0 TOBACCO USE: ICD-10-CM

## 2019-09-13 DIAGNOSIS — R20.2 SENSATION OF SKIN CRAWLING: ICD-10-CM

## 2019-09-13 DIAGNOSIS — B00.1 HERPES LABIALIS: ICD-10-CM

## 2019-09-13 DIAGNOSIS — F90.0 ATTENTION DEFICIT HYPERACTIVITY DISORDER (ADHD), PREDOMINANTLY INATTENTIVE TYPE: ICD-10-CM

## 2019-09-13 DIAGNOSIS — M79.7 FIBROMYALGIA: ICD-10-CM

## 2019-09-13 DIAGNOSIS — N76.0 BV (BACTERIAL VAGINOSIS): ICD-10-CM

## 2019-09-13 DIAGNOSIS — B85.2 LICE: Primary | ICD-10-CM

## 2019-09-13 DIAGNOSIS — M79.7 FIBROMYALGIA MUSCLE PAIN: ICD-10-CM

## 2019-09-13 PROCEDURE — 90686 IIV4 VACC NO PRSV 0.5 ML IM: CPT | Performed by: FAMILY MEDICINE

## 2019-09-13 PROCEDURE — 90471 IMMUNIZATION ADMIN: CPT | Performed by: FAMILY MEDICINE

## 2019-09-13 PROCEDURE — 99214 OFFICE O/P EST MOD 30 MIN: CPT | Mod: 25 | Performed by: FAMILY MEDICINE

## 2019-09-13 ASSESSMENT — MIFFLIN-ST. JEOR: SCORE: 1446.82

## 2019-09-13 NOTE — PROGRESS NOTES
Subjective     Marlin Lopez is a 49 year old female who presents to clinic today for the following health issues:    HPI   Medication Follow up of Adderall    Taking Medication as prescribed: yes    Side Effects:  None    Medication Helping Symptoms:  yes     Bronchitis:  Treated for bronchitis: doxycyline, albuterol  Will be moving to [place] for the winter an hopes climate change will help.    Head Lice   Been having itching/crawling feeling and thinks has lice  Has done OTC treatment    ADHD Follow-Up    Date of last ADHD office visit:   Status since last visit: Stable  Taking controlled (daily) medications as prescribed: Yes                       Parent/Patient Concerns with Medications: None  ADHD Medication     Amphetamines Disp Start End     amphetamine-dextroamphetamine (ADDERALL) 20 MG tablet    60 tablet 8/15/2019 2019    Sig - Route: Take 1 tablet (20 mg) by mouth 2 times daily - Oral    Class: Local Print    Earliest Fill Date: 8/15/2019     amphetamine-dextroamphetamine (ADDERALL) 20 MG tablet ()    60 tablet 6/15/2019 7/15/2019    Sig - Route: Take 1 tablet (20 mg) by mouth 2 times daily - Oral    Class: Local Print    Earliest Fill Date: 6/15/2019     amphetamine-dextroamphetamine (ADDERALL) 20 MG tablet ()    60 tablet 7/15/2019 2019    Sig - Route: Take 1 tablet (20 mg) by mouth 2 times daily - Oral    Class: Local Print    Earliest Fill Date: 7/15/2019     amphetamine-dextroamphetamine (ADDERALL) 20 MG tablet ()    60 tablet 3/15/2019 2019    Sig - Route: Take 1 tablet (20 mg) by mouth 2 times daily - Oral    Class: Local Print    Earliest Fill Date: 3/15/2019     amphetamine-dextroamphetamine (ADDERALL) 20 MG tablet ()    60 tablet 4/15/2019 5/15/2019    Sig - Route: Take 1 tablet (20 mg) by mouth 2 times daily - Oral    Class: Local Print    Earliest Fill Date: 4/15/2019     amphetamine-dextroamphetamine (ADDERALL) 20 MG tablet ()    60  "tablet 5/15/2019 6/14/2019    Sig - Route: Take 1 tablet (20 mg) by mouth 2 times daily - Oral    Class: Local Print    Earliest Fill Date: 5/15/2019        Sleep: no problems  Home/Family Concerns: None  Peer Concerns: None    Co-Morbid Diagnosis: Depression and chronic pain issues    Currently in counseling: No    Medication Benefits:   Controlled symptoms: Attention span, Distractability, Finishing tasks and Impulse control  Uncontrolled Symptoms: Accepting limits and None    Medication side effects:  Side effects noted: none  Denies: insomnia, tics, palpitations, stomach ache, headache, emotional lability, rebound irritability and drowsiness  PROBLEMS TO ADD ON...          Reviewed and updated as needed this visit by Provider        Refills:  1. Adderall  2. Trazodone  3. Ibuprofen as needed  4. Valtrex for cold sores  5. Albuterol for allergies  6. Gabapentin, Frexiril   7. Zaditor/Allegra  8. Nicotine patches    Review of Systems   Constitutional, HEENT, cardiovascular, pulmonary, gi and gu systems are negative, except as otherwise noted.      Objective    /78 (BP Location: Right arm, Patient Position: Chair, Cuff Size: Adult Regular)   Pulse 105   Temp 98.2  F (36.8  C) (Oral)   Ht 5' 9.88\" (1.775 m)   Wt 165 lb (74.8 kg)   LMP 01/03/2014   SpO2 98%   BMI 23.76 kg/m    Body mass index is 23.76 kg/m .  Physical Exam   GENERAL: healthy, alert and no distress  SACLP: No nits or lice or skin lesions. Dense hair  NECK: no adenopathy and thyroid normal to palpation  RESP: lungs clear to auscultation - no rales, rhonchi or wheezes  CV: regular rate and rhythm, normal S1 S2, no S3 or S4, no murmur, click or rub, no peripheral edema  ABDOMEN: soft, nontender, no masses and bowel sounds normal  MS: no gross musculoskeletal defects noted, no edema  NEURO: Normal strength and tone, mentation intact and speech normal  PSYCH: mentation appears normal, affect normal/bright    Diagnostic Test Results:  Labs " reviewed in Epic        Assessment & Plan     Marlin was seen today for a.d.h.d and imm/inj.    Diagnoses and all orders for this visit:    Lice  -     permethrin (ELIMITE) 1 % external lotion; Apply topically once for 1 dose Head lice: Topical: Cream rinse/lotion 1%: Prior to application, wash hair with conditioner-free shampoo; rinse with water and towel dry. Apply a sufficient amount of lotion or cream rinse to saturate the hair and scalp (especially behind the ears and nape of neck). Leave on hair for 10 minutes (but no longer), then rinse off with warm water; remove remaining nits with nit comb. A single application is generally sufficient; however, may repeat 7 days after first treatment if lice or nits are still present.    Sensation of skin crawling      -   No bugs in hair. Empiric treatment for head lice    Need for prophylactic vaccination and inoculation against influenza  -     INFLUENZA VACCINE IM > 6 MONTHS VALENT IIV4 [65375]  -     Vaccine Administration, Initial [64854]    Attention deficit hyperactivity disorder (ADHD), predominantly inattentive type  -     amphetamine-dextroamphetamine (ADDERALL) 20 MG tablet; Take 1 tablet (20 mg) by mouth 2 times daily  -     amphetamine-dextroamphetamine (ADDERALL) 20 MG tablet; Take 1 tablet (20 mg) by mouth 2 times daily  -     amphetamine-dextroamphetamine (ADDERALL) 20 MG tablet; Take 1 tablet (20 mg) by mouth 2 times daily    Itchy eyes  -     fexofenadine (ALLEGRA) 180 MG tablet; Take 1 tablet (180 mg) by mouth daily  -     ketotifen (ZADITOR) 0.025 % ophthalmic solution; Place 1 drop into both eyes 2 times daily    Fibromyalgia muscle pain  -     gabapentin (NEURONTIN) 300 MG capsule; TAKE 1 CAPSULE(300 MG) BY MOUTH THREE TIMES DAILY    Scalp itch     -    Patient think is lice, no nix or lice on exposure.    Fibromyalgia  -     cyclobenzaprine (FLEXERIL) 10 MG tablet; Take 1 tablet (10 mg) by mouth At Bedtime  -     ibuprofen (ADVIL/MOTRIN) 800 MG  tablet; TAKE 1 TABLET(800 MG) BY MOUTH EVERY 8 HOURS AS NEEDED FOR MODERATE PAIN    Tobacco use disorder  -     nicotine (NICODERM CQ) 21 MG/24HR 24 hr patch; Place 1 patch onto the skin every 24 hours    Insomnia, unspecified type  -     traZODone (DESYREL) 50 MG tablet; TAKE 1 TABLET(50 MG) BY MOUTH AT BEDTIME    Herpes labialis  -     valACYclovir (VALTREX) 1000 mg tablet; TAKE 2 TABLETS(2000 MG) BY MOUTH TWICE DAILY    Wheezing  -     albuterol (PROAIR RESPICLICK) 108 (90 Base) MCG/ACT inhaler; Inhale 2 puffs into the lungs every 6 hours as needed for shortness of breath / dyspnea or wheezing    Return in about 3 months (around 12/13/2019).    Zach Corona MD  [facility]

## 2019-09-15 RX ORDER — CYCLOBENZAPRINE HCL 10 MG
10 TABLET ORAL AT BEDTIME
Qty: 30 TABLET | Refills: 2 | Status: SHIPPED | OUTPATIENT
Start: 2019-09-15 | End: 2019-12-27

## 2019-09-15 RX ORDER — VALACYCLOVIR HYDROCHLORIDE 1 G/1
TABLET, FILM COATED ORAL
Qty: 20 TABLET | Refills: 2 | Status: SHIPPED | OUTPATIENT
Start: 2019-09-15 | End: 2019-12-27

## 2019-09-15 RX ORDER — IBUPROFEN 800 MG/1
TABLET, FILM COATED ORAL
Qty: 90 TABLET | Refills: 0 | Status: SHIPPED | OUTPATIENT
Start: 2019-09-15 | End: 2020-04-10

## 2019-09-15 RX ORDER — DEXTROAMPHETAMINE SACCHARATE, AMPHETAMINE ASPARTATE, DEXTROAMPHETAMINE SULFATE AND AMPHETAMINE SULFATE 5; 5; 5; 5 MG/1; MG/1; MG/1; MG/1
20 TABLET ORAL 2 TIMES DAILY
Qty: 60 TABLET | Refills: 0 | Status: SHIPPED | OUTPATIENT
Start: 2019-09-15 | End: 2019-10-01

## 2019-09-15 RX ORDER — DEXTROAMPHETAMINE SACCHARATE, AMPHETAMINE ASPARTATE, DEXTROAMPHETAMINE SULFATE AND AMPHETAMINE SULFATE 5; 5; 5; 5 MG/1; MG/1; MG/1; MG/1
20 TABLET ORAL 2 TIMES DAILY
Qty: 60 TABLET | Refills: 0 | Status: SHIPPED | OUTPATIENT
Start: 2019-10-16 | End: 2019-11-08

## 2019-09-15 RX ORDER — DEXTROAMPHETAMINE SACCHARATE, AMPHETAMINE ASPARTATE, DEXTROAMPHETAMINE SULFATE AND AMPHETAMINE SULFATE 5; 5; 5; 5 MG/1; MG/1; MG/1; MG/1
20 TABLET ORAL 2 TIMES DAILY
Qty: 60 TABLET | Refills: 0 | Status: SHIPPED | OUTPATIENT
Start: 2019-11-16 | End: 2019-11-19

## 2019-09-15 RX ORDER — NICOTINE 21 MG/24HR
1 PATCH, TRANSDERMAL 24 HOURS TRANSDERMAL EVERY 24 HOURS
Qty: 30 PATCH | Refills: 2 | Status: SHIPPED | OUTPATIENT
Start: 2019-09-15 | End: 2019-12-27

## 2019-09-15 RX ORDER — GABAPENTIN 300 MG/1
CAPSULE ORAL
Qty: 90 CAPSULE | Refills: 2 | Status: SHIPPED | OUTPATIENT
Start: 2019-09-15 | End: 2019-12-27

## 2019-09-15 RX ORDER — FEXOFENADINE HCL 180 MG/1
180 TABLET ORAL DAILY
Qty: 30 TABLET | Refills: 2 | Status: SHIPPED | OUTPATIENT
Start: 2019-09-15 | End: 2019-12-27

## 2019-09-15 RX ORDER — TRAZODONE HYDROCHLORIDE 50 MG/1
TABLET, FILM COATED ORAL
Qty: 90 TABLET | Refills: 1 | Status: SHIPPED | OUTPATIENT
Start: 2019-09-15 | End: 2019-12-27

## 2019-09-16 ENCOUNTER — TELEPHONE (OUTPATIENT)
Dept: FAMILY MEDICINE | Facility: CLINIC | Age: 49
End: 2019-09-16

## 2019-09-16 NOTE — TELEPHONE ENCOUNTER
Prior Authorization Retail Medication Request    Medication/Dose:   ICD code (if different than what is on RX):  Wheezing [R06.2]   Previously Tried and Failed:    Rationale:  Plan does not cover this medication.     Insurance Name:  [employer] [place] [[id number]]  Insurance ID:  [id number]      Pharmacy Information (if different than what is on RX)  Name:  [facility]  Phone:  [phone]    
Post-Care Instructions: I reviewed with the patient in detail post-care instructions. Patient is to wear sunprotection, and avoid picking at any of the treated lesions. Pt may apply Vaseline to crusted or scabbing areas.
Include Z78.9 (Other Specified Conditions Influencing Health Status) As An Associated Diagnosis?: No
Consent: The patient's consent was obtained including but not limited to risks of crusting, scabbing, blistering, scarring, darker or lighter pigmentary change, recurrence, incomplete removal and infection.
Medical Necessity Information: It is in your best interest to select a reason for this procedure from the list below. All of these items fulfill various CMS LCD requirements except the new and changing color options.
Detail Level: Detailed
Medical Necessity Clause: This procedure was medically necessary because the lesions that were treated were:

## 2019-09-26 DIAGNOSIS — F90.0 ATTENTION DEFICIT HYPERACTIVITY DISORDER (ADHD), PREDOMINANTLY INATTENTIVE TYPE: ICD-10-CM

## 2019-09-26 NOTE — TELEPHONE ENCOUNTER
Controlled Substance Refill Request for amphetamine-dextroamphetamine (ADDERALL) 20 MG tablet  Problem List Complete:  No     PROVIDER TO CONSIDER COMPLETION OF PROBLEM LIST AND OVERVIEW/CONTROLLED SUBSTANCE AGREEMENT    Last Written Prescription Date:  9/15/2019  Last Fill Quantity: 60,   # refills: 0    THE MOST RECENT OFFICE VISIT MUST BE WITHIN THE PAST 3 MONTHS. AT LEAST ONE FACE TO FACE VISIT MUST OCCUR EVERY 6 MONTHS. ADDITIONAL VISITS CAN BE VIRTUAL.  (THIS STATEMENT SHOULD BE DELETED.)    [place] Office Visit [place] [facility] [place]: 9/13/2019    Future Office visit:     Controlled substance agreement:   Encounter-Level CSA - 01/18/2017:    Controlled Substance Agreement - Scan on 1/26/2017  2:04 PM: CONTROLLED SUBSTANCE AGREEMENT     Patient-Level CSA:    There are no patient-level csa.         Last Urine Drug Screen: No results found for: CDAUT, No results found for: COMDAT, No results found for: THC13, PCP13, COC13, MAMP13, OPI13, AMP13, BZO13, TCA13, MTD13, BAR13, OXY13, PPX13, BUP13     Processing:  unknown     [email or web address]       checked in past 3 months?  No, route to RN

## 2019-09-26 NOTE — TELEPHONE ENCOUNTER
Reason for Call:  Other call back    Detailed comments: patient needs her prescription amphetamine-dextroamphetamine (ADDERALL) sent to her pharmacy down in [place].    [facility] [address].    [phone] (pharmacy phone)        Phone Number Patient can be reached at: Cell number on file:    Telephone Information:   Mobile [phone]       Best Time: any    Can we leave a detailed message on this number? YES    Call taken on 9/26/2019 at 5:41 PM by Ayesha Thomas

## 2019-09-27 RX ORDER — DEXTROAMPHETAMINE SACCHARATE, AMPHETAMINE ASPARTATE, DEXTROAMPHETAMINE SULFATE AND AMPHETAMINE SULFATE 5; 5; 5; 5 MG/1; MG/1; MG/1; MG/1
20 TABLET ORAL 2 TIMES DAILY
Qty: 60 TABLET | Refills: 0 | OUTPATIENT
Start: 2019-09-27

## 2019-09-27 NOTE — TELEPHONE ENCOUNTER
RX [place] ([place]) reviewed:  reviewed- no concerns    [place]:  [email or web address]    Eugenie Gillespie RN - BC

## 2019-09-30 NOTE — TELEPHONE ENCOUNTER
Patient requesting refills sent to [facility]. [address] Down in [place]. Refills were sent to [id number] - [place], [place] - [address] AT [facility] [address]

## 2019-10-01 RX ORDER — DEXTROAMPHETAMINE SACCHARATE, AMPHETAMINE ASPARTATE, DEXTROAMPHETAMINE SULFATE AND AMPHETAMINE SULFATE 5; 5; 5; 5 MG/1; MG/1; MG/1; MG/1
20 TABLET ORAL 2 TIMES DAILY
Qty: 60 TABLET | Refills: 0 | OUTPATIENT
Start: 2019-11-16

## 2019-10-01 RX ORDER — DEXTROAMPHETAMINE SACCHARATE, AMPHETAMINE ASPARTATE, DEXTROAMPHETAMINE SULFATE AND AMPHETAMINE SULFATE 5; 5; 5; 5 MG/1; MG/1; MG/1; MG/1
20 TABLET ORAL 2 TIMES DAILY
Qty: 60 TABLET | Refills: 0 | OUTPATIENT
Start: 2019-10-16

## 2019-10-01 RX ORDER — DEXTROAMPHETAMINE SACCHARATE, AMPHETAMINE ASPARTATE, DEXTROAMPHETAMINE SULFATE AND AMPHETAMINE SULFATE 5; 5; 5; 5 MG/1; MG/1; MG/1; MG/1
20 TABLET ORAL 2 TIMES DAILY
Qty: 60 TABLET | Refills: 0 | Status: SHIPPED | OUTPATIENT
Start: 2019-10-01 | End: 2020-03-11

## 2019-10-04 ENCOUNTER — TELEPHONE (OUTPATIENT)
Dept: FAMILY MEDICINE | Facility: CLINIC | Age: 49
End: 2019-10-04

## 2019-10-04 NOTE — TELEPHONE ENCOUNTER
Patient requesting call back to discuss medication. She can be reached at [phone]. Ok to leave a voicemail.

## 2019-10-04 NOTE — TELEPHONE ENCOUNTER
Patient asking for prescription for adderall- noted prescription was sent on 10/1/19 to [place] pharmacy. Patient will speak with pharmacy about refill.  Patient has other concerns and wanted orders for mammo and US but will call back Mon so she can discuss further.   Ashley Rodriguez RN

## 2019-10-09 NOTE — TELEPHONE ENCOUNTER
Called pt. States she is out of [place] right now, will hold off right now on mammo and US. Will have to call back when she is back in state for a longer period of time. She has orders for mammo and US. Advised to call main line for mammo and imaging for US. Pt had no further questions or concerns.    Clementina Hartley RN  [facility]

## 2019-11-08 DIAGNOSIS — F90.9 ATTENTION DEFICIT HYPERACTIVITY DISORDER (ADHD), UNSPECIFIED ADHD TYPE: ICD-10-CM

## 2019-11-08 DIAGNOSIS — F90.0 ATTENTION DEFICIT HYPERACTIVITY DISORDER (ADHD), PREDOMINANTLY INATTENTIVE TYPE: ICD-10-CM

## 2019-11-08 RX ORDER — DEXTROAMPHETAMINE SACCHARATE, AMPHETAMINE ASPARTATE, DEXTROAMPHETAMINE SULFATE AND AMPHETAMINE SULFATE 5; 5; 5; 5 MG/1; MG/1; MG/1; MG/1
20 TABLET ORAL 2 TIMES DAILY
Qty: 60 TABLET | Refills: 0 | Status: CANCELLED | OUTPATIENT
Start: 2019-11-08

## 2019-11-08 NOTE — TELEPHONE ENCOUNTER
Script was sent 10/1/19 to [place] and 10/16 to [facility].  She has not filled either of these scripts?  Can we double check with pharmacies?    Thanks,  Jodi Iqbal, CNP

## 2019-11-08 NOTE — TELEPHONE ENCOUNTER
Reason for call:  Other   Patient called regarding (reason for call): prescription  Additional comments: she needs her adderal faxed to [place]. Fax to [facility]    Phone number to reach patient:  Cell number on file:    Telephone Information:   Mobile [phone]       Best Time:   Any     Can we leave a detailed message on this number?  YES

## 2019-11-08 NOTE — TELEPHONE ENCOUNTER
Controlled Substance Refill Request for amphetamine-dextroamphetamine (ADDERALL) 20 MG tablet  Problem List Complete:  Yes  Patient is followed by Data Unavailable for ongoing controlled substance prescription. Med: Adderall IR.   Maximum use per month: 60  Expected duration: 30 days  Controlled substance agreement on file: YES  Clinic visit recommended: Q 3 months       checked in past 3 months?  No, route to RN

## 2019-11-08 NOTE — TELEPHONE ENCOUNTER
spoke to patient and she said that it was not sent to the pharmacy in [place]. Please send RX to [facility]. [address]  Pharmacy # :[phone]    Kemar Germain CMA on 11/8/2019 at 12:59 PM

## 2019-11-08 NOTE — TELEPHONE ENCOUNTER
Per , last Rx that was filled in [place] was written by Cj on 06/11/19.   [place]: Rx written 10/01/19 by Cj was was filled 10/02/19 for #60 (30 day supply)

## 2019-11-08 NOTE — TELEPHONE ENCOUNTER
Pt did not fill for any of the scripts written 09/15/19 per .   Pt would like new scripts sent to pharmacy in [place].   Please review/sign or advise.

## 2019-11-08 NOTE — TELEPHONE ENCOUNTER
There was a prescription written already - earliest fill date is 11/16/19. The Rx that was written on 10/16/19 should last her 1 month. Please notify patient.    amphetamine-dextroamphetamine (ADDERALL) 20 MG tablet 60 tablet 0 10/16/2019 11/15/2019 --   Sig - Route: Take 1 tablet (20 mg) by mouth 2 times daily - Oral   Sent to pharmacy as: amphetamine-dextroamphetamine (ADDERALL) 20 MG tablet   Class: E-Prescribe   Earliest Fill Date: 10/16/2019   Order: [id number]   E-Prescribing Status: Receipt confirmed by pharmacy (9/15/2019 11:49 AM CDT)     amphetamine-dextroamphetamine (ADDERALL) 20 MG tablet 60 tablet 0 11/16/2019 12/16/2019 --   Sig - Route: Take 1 tablet (20 mg) by mouth 2 times daily - Oral   Sent to pharmacy as: amphetamine-dextroamphetamine (ADDERALL) 20 MG tablet   Class: E-Prescribe   Earliest Fill Date: 11/16/2019   Order: [id number]   E-Prescribing Status: Receipt confirmed by pharmacy (9/15/2019 11:49 AM CDT)     Lili López RN

## 2019-11-11 RX ORDER — DEXTROAMPHETAMINE SACCHARATE, AMPHETAMINE ASPARTATE, DEXTROAMPHETAMINE SULFATE AND AMPHETAMINE SULFATE 5; 5; 5; 5 MG/1; MG/1; MG/1; MG/1
20 TABLET ORAL 2 TIMES DAILY
Qty: 60 TABLET | Refills: 0 | OUTPATIENT
Start: 2019-12-16

## 2019-11-11 RX ORDER — DEXTROAMPHETAMINE SACCHARATE, AMPHETAMINE ASPARTATE, DEXTROAMPHETAMINE SULFATE AND AMPHETAMINE SULFATE 5; 5; 5; 5 MG/1; MG/1; MG/1; MG/1
20 TABLET ORAL 2 TIMES DAILY
Qty: 60 TABLET | Refills: 0 | OUTPATIENT
Start: 2019-11-11

## 2019-11-11 RX ORDER — DEXTROAMPHETAMINE SACCHARATE, AMPHETAMINE ASPARTATE, DEXTROAMPHETAMINE SULFATE AND AMPHETAMINE SULFATE 5; 5; 5; 5 MG/1; MG/1; MG/1; MG/1
20 TABLET ORAL 2 TIMES DAILY
Qty: 60 TABLET | Refills: 0 | Status: SHIPPED | OUTPATIENT
Start: 2020-01-16 | End: 2020-03-11

## 2019-11-14 ENCOUNTER — TELEPHONE (OUTPATIENT)
Dept: FAMILY MEDICINE | Facility: CLINIC | Age: 49
End: 2019-11-14

## 2019-11-14 DIAGNOSIS — N76.0 ACUTE VAGINITIS: Primary | ICD-10-CM

## 2019-11-15 NOTE — TELEPHONE ENCOUNTER
Left message for patient to call RN hotline [phone].   Advised patient she would need to be seen if having symptoms and to call [phone] to schedule.    Lili López RN

## 2019-11-18 RX ORDER — CLINDAMYCIN PHOSPHATE 20 MG/G
CREAM VAGINAL
Qty: 40 G | Refills: 0 | Status: SHIPPED | OUTPATIENT
Start: 2019-11-18 | End: 2020-04-10

## 2019-11-18 NOTE — TELEPHONE ENCOUNTER
Spoke with patient and advised that prescription was sent, but to see provider if not better after. Patient very thankful  Ashley Rodriguez RN

## 2019-11-18 NOTE — TELEPHONE ENCOUNTER
Dr. Tyler,   Patient asked that medication refill request be sent to you after being advised that she needs OV/UC in [place] or can do [email or web address] (pt does not have mychart, so e-visit not an option). Per pt, she is aware of her options, but it costs a lot of money to do that. She can't understand why her doctor that knows her and as seen her for 30 years can't refill the medication. Writer explained that this is [employer] policy. She is in [place] with no health insurance. Living in [place]. There for the winter.   Pt requesting med for BV. Last prescribed in 2017.

## 2019-11-19 ENCOUNTER — TELEPHONE (OUTPATIENT)
Dept: FAMILY MEDICINE | Facility: CLINIC | Age: 49
End: 2019-11-19

## 2019-11-19 DIAGNOSIS — F90.0 ATTENTION DEFICIT HYPERACTIVITY DISORDER (ADHD), PREDOMINANTLY INATTENTIVE TYPE: ICD-10-CM

## 2019-11-19 RX ORDER — DEXTROAMPHETAMINE SACCHARATE, AMPHETAMINE ASPARTATE, DEXTROAMPHETAMINE SULFATE AND AMPHETAMINE SULFATE 5; 5; 5; 5 MG/1; MG/1; MG/1; MG/1
20 TABLET ORAL 2 TIMES DAILY
Qty: 60 TABLET | Refills: 0 | Status: SHIPPED | OUTPATIENT
Start: 2019-11-19 | End: 2019-12-27

## 2019-11-19 NOTE — TELEPHONE ENCOUNTER
Spoke with pharmacist at [facility] in [place], [place]. Pt has the prescriptions on profile at an out of state ([place]) pharmacy, but they cannot be transferred. They would need a new script sent to them. Also, they advised that NP's are not allowed to prescribe more than a 7 day supply of C2's without a certification in [place], but since it is ok in [place], this is ok. Please advise on new rx.    Clementina Hartley RN  [facility]

## 2019-11-19 NOTE — TELEPHONE ENCOUNTER
Reason for call:  Medication   If this is a refill request, has the caller requested the refill from the pharmacy already? Yes  Will the patient be using a [facility]? No  Name of the pharmacy and phone number for the current request: [id number] [place] [id number] [facility] AT [facility] & [facility]  [phone]    Name of the medication requested: Adderall    Other request: Patient is out of medication. States the pharmacy has renewal for only January, but she needs Rx for November, December and January.     Phone number to reach patient:  Home number on file [phone] (home)    Best Time:  Before 4 pm    Can we leave a detailed message on this number?  YES

## 2019-12-19 DIAGNOSIS — F90.0 ATTENTION DEFICIT HYPERACTIVITY DISORDER (ADHD), PREDOMINANTLY INATTENTIVE TYPE: ICD-10-CM

## 2019-12-19 NOTE — TELEPHONE ENCOUNTER
Reason for call:  Other   Patient called regarding (reason for call): prescription  Additional comments: needs a rx for adderall. Send rx to [place].    Phone number to reach patient:  Home number on file [phone] (home)    Best Time:   Any     Can we leave a detailed message on this number?  YES

## 2019-12-20 NOTE — TELEPHONE ENCOUNTER
Routing refill request to provider for review/approval because:  Drug not on the [facility] refill protocol   Unable to check  - not listed as delegate    Requested Prescriptions   Pending Prescriptions Disp Refills     amphetamine-dextroamphetamine (ADDERALL) 20 MG tablet 60 tablet 0     Sig: Take 1 tablet (20 mg) by mouth 2 times daily       There is no refill protocol information for this order        Lili López RN

## 2019-12-27 ENCOUNTER — OFFICE VISIT (OUTPATIENT)
Dept: FAMILY MEDICINE | Facility: CLINIC | Age: 49
End: 2019-12-27
Payer: COMMERCIAL

## 2019-12-27 VITALS
SYSTOLIC BLOOD PRESSURE: 108 MMHG | DIASTOLIC BLOOD PRESSURE: 66 MMHG | TEMPERATURE: 98.3 F | WEIGHT: 166 LBS | BODY MASS INDEX: 23.9 KG/M2 | HEART RATE: 92 BPM | OXYGEN SATURATION: 100 %

## 2019-12-27 DIAGNOSIS — L29.9 SCALP ITCH: ICD-10-CM

## 2019-12-27 DIAGNOSIS — R10.2 PELVIC PAIN IN FEMALE: Primary | ICD-10-CM

## 2019-12-27 DIAGNOSIS — N76.0 BV (BACTERIAL VAGINOSIS): ICD-10-CM

## 2019-12-27 DIAGNOSIS — B00.1 HERPES LABIALIS: ICD-10-CM

## 2019-12-27 DIAGNOSIS — N30.01 ACUTE CYSTITIS WITH HEMATURIA: ICD-10-CM

## 2019-12-27 DIAGNOSIS — H57.9 ITCHY EYES: ICD-10-CM

## 2019-12-27 DIAGNOSIS — B00.1 RECURRENT COLD SORES: ICD-10-CM

## 2019-12-27 DIAGNOSIS — G47.00 INSOMNIA, UNSPECIFIED TYPE: ICD-10-CM

## 2019-12-27 DIAGNOSIS — M79.7 FIBROMYALGIA: ICD-10-CM

## 2019-12-27 DIAGNOSIS — F90.0 ATTENTION DEFICIT HYPERACTIVITY DISORDER (ADHD), PREDOMINANTLY INATTENTIVE TYPE: ICD-10-CM

## 2019-12-27 DIAGNOSIS — R06.2 WHEEZING: ICD-10-CM

## 2019-12-27 DIAGNOSIS — F17.200 TOBACCO USE DISORDER: ICD-10-CM

## 2019-12-27 DIAGNOSIS — R82.90 NONSPECIFIC FINDING ON EXAMINATION OF URINE: ICD-10-CM

## 2019-12-27 DIAGNOSIS — Z12.39 BREAST CANCER SCREENING: ICD-10-CM

## 2019-12-27 DIAGNOSIS — M79.7 FIBROMYALGIA MUSCLE PAIN: ICD-10-CM

## 2019-12-27 DIAGNOSIS — B96.89 BV (BACTERIAL VAGINOSIS): ICD-10-CM

## 2019-12-27 LAB
ALBUMIN UR-MCNC: NEGATIVE MG/DL
APPEARANCE UR: CLEAR
BACTERIA #/AREA URNS HPF: ABNORMAL /HPF
BILIRUB UR QL STRIP: NEGATIVE
COLOR UR AUTO: YELLOW
GLUCOSE UR STRIP-MCNC: NEGATIVE MG/DL
HGB UR QL STRIP: ABNORMAL
KETONES UR STRIP-MCNC: ABNORMAL MG/DL
LEUKOCYTE ESTERASE UR QL STRIP: ABNORMAL
NITRATE UR QL: NEGATIVE
NON-SQ EPI CELLS #/AREA URNS LPF: ABNORMAL /LPF
PH UR STRIP: 7 PH (ref 5–7)
RBC #/AREA URNS AUTO: ABNORMAL /HPF
SOURCE: ABNORMAL
SP GR UR STRIP: 1.01 (ref 1–1.03)
SPECIMEN SOURCE: NORMAL
UROBILINOGEN UR STRIP-ACNC: 0.2 EU/DL (ref 0.2–1)
WBC #/AREA URNS AUTO: ABNORMAL /HPF
WET PREP SPEC: NORMAL

## 2019-12-27 PROCEDURE — 81001 URINALYSIS AUTO W/SCOPE: CPT | Performed by: FAMILY MEDICINE

## 2019-12-27 PROCEDURE — 87086 URINE CULTURE/COLONY COUNT: CPT | Performed by: FAMILY MEDICINE

## 2019-12-27 PROCEDURE — 99213 OFFICE O/P EST LOW 20 MIN: CPT | Performed by: FAMILY MEDICINE

## 2019-12-27 PROCEDURE — 87491 CHLMYD TRACH DNA AMP PROBE: CPT | Performed by: FAMILY MEDICINE

## 2019-12-27 PROCEDURE — 87591 N.GONORRHOEAE DNA AMP PROB: CPT | Performed by: FAMILY MEDICINE

## 2019-12-27 PROCEDURE — 87210 SMEAR WET MOUNT SALINE/INK: CPT | Performed by: FAMILY MEDICINE

## 2019-12-27 RX ORDER — METRONIDAZOLE 7.5 MG/G
GEL VAGINAL
Qty: 70 G | Refills: 5 | Status: SHIPPED | OUTPATIENT
Start: 2019-12-27 | End: 2020-04-10

## 2019-12-27 RX ORDER — NITROFURANTOIN 25; 75 MG/1; MG/1
100 CAPSULE ORAL 2 TIMES DAILY
Qty: 14 CAPSULE | Refills: 0 | Status: SHIPPED | OUTPATIENT
Start: 2019-12-27 | End: 2020-03-11

## 2019-12-27 RX ORDER — DEXTROAMPHETAMINE SACCHARATE, AMPHETAMINE ASPARTATE, DEXTROAMPHETAMINE SULFATE AND AMPHETAMINE SULFATE 5; 5; 5; 5 MG/1; MG/1; MG/1; MG/1
20 TABLET ORAL 2 TIMES DAILY
Qty: 60 TABLET | Refills: 0 | Status: CANCELLED | OUTPATIENT
Start: 2020-01-16

## 2019-12-27 RX ORDER — DEXTROAMPHETAMINE SACCHARATE, AMPHETAMINE ASPARTATE, DEXTROAMPHETAMINE SULFATE AND AMPHETAMINE SULFATE 5; 5; 5; 5 MG/1; MG/1; MG/1; MG/1
20 TABLET ORAL 2 TIMES DAILY
Qty: 60 TABLET | Refills: 0 | OUTPATIENT
Start: 2019-12-27

## 2019-12-27 RX ORDER — NICOTINE 21 MG/24HR
1 PATCH, TRANSDERMAL 24 HOURS TRANSDERMAL EVERY 24 HOURS
Qty: 30 PATCH | Refills: 2 | Status: SHIPPED | OUTPATIENT
Start: 2019-12-27 | End: 2020-04-10

## 2019-12-27 RX ORDER — ACYCLOVIR 50 MG/G
CREAM TOPICAL
Qty: 5 G | Refills: 3 | Status: CANCELLED | OUTPATIENT
Start: 2019-12-27

## 2019-12-27 RX ORDER — HYDROXYZINE PAMOATE 50 MG/1
50 CAPSULE ORAL 3 TIMES DAILY PRN
Qty: 30 CAPSULE | Refills: 0 | Status: CANCELLED | OUTPATIENT
Start: 2019-12-27

## 2019-12-27 RX ORDER — GABAPENTIN 300 MG/1
CAPSULE ORAL
Qty: 90 CAPSULE | Refills: 2 | Status: SHIPPED | OUTPATIENT
Start: 2019-12-27 | End: 2020-04-10

## 2019-12-27 RX ORDER — FEXOFENADINE HCL 180 MG/1
180 TABLET ORAL DAILY
Qty: 30 TABLET | Refills: 2 | Status: SHIPPED | OUTPATIENT
Start: 2019-12-27 | End: 2020-04-10

## 2019-12-27 RX ORDER — TRAZODONE HYDROCHLORIDE 50 MG/1
TABLET, FILM COATED ORAL
Qty: 90 TABLET | Refills: 1 | Status: SHIPPED | OUTPATIENT
Start: 2019-12-27 | End: 2020-04-10

## 2019-12-27 RX ORDER — VALACYCLOVIR HYDROCHLORIDE 1 G/1
TABLET, FILM COATED ORAL
Qty: 20 TABLET | Refills: 2 | Status: SHIPPED | OUTPATIENT
Start: 2019-12-27 | End: 2020-10-14

## 2019-12-27 RX ORDER — CYCLOBENZAPRINE HCL 10 MG
10 TABLET ORAL AT BEDTIME
Qty: 30 TABLET | Refills: 2 | Status: SHIPPED | OUTPATIENT
Start: 2019-12-27 | End: 2020-03-10

## 2019-12-27 RX ORDER — IBUPROFEN 800 MG/1
TABLET, FILM COATED ORAL
Qty: 90 TABLET | Refills: 0 | Status: CANCELLED | OUTPATIENT
Start: 2019-12-27

## 2019-12-27 RX ORDER — ALBUTEROL SULFATE 90 UG/1
2 AEROSOL, METERED RESPIRATORY (INHALATION) EVERY 6 HOURS
Qty: 1 INHALER | Refills: 1 | Status: SHIPPED | OUTPATIENT
Start: 2019-12-27 | End: 2020-04-10

## 2019-12-27 RX ORDER — ONDANSETRON 4 MG/1
4 TABLET, ORALLY DISINTEGRATING ORAL EVERY 8 HOURS PRN
Qty: 5 TABLET | Refills: 0 | Status: CANCELLED | OUTPATIENT
Start: 2019-12-27

## 2019-12-27 NOTE — PROGRESS NOTES
Subjective     Marlin Lopez is a 49 year old female who presents to clinic today for the following health issues:    HPI   URINARY TRACT SYMPTOMS    Has had UTI before and feel like symptoms having now are from UTI   Wants check for UTI and STD    Medication Refills    She would like a review of her medications and get refills.  She is in [place] came to see her daughter woh had knee surgery and returning soon  Her insurance does not cover her in [place] so not able to see a doctor there.    Patient Active Problem List   Diagnosis     Anxiety state     Fibromyalgia     Sleep problems     Recurrent cold sores     Moderate major depression (H)     ADHD (attention deficit hyperactivity disorder)     CARDIOVASCULAR SCREENING; LDL GOAL LESS THAN 130     Family history of colon cancer     Allergic rhinitis     Renal cyst, left     Chronic constipation     Diverticulosis of large intestine without hemorrhage     Ulceration of colon determined by endoscopy     Past Surgical History:   Procedure Laterality Date     C REMOVAL OF OVARY(S)      Left, cyst     TONSILLECTOMY         Social History     Tobacco Use     Smoking status: Former Smoker     Packs/day: 0.50     Types: Cigarettes     Start date: 1980     Last attempt to quit: 2015     Years since quittin.2     Smokeless tobacco: Never Used     Tobacco comment:  using e-cig daily   Substance Use Topics     Alcohol use: Yes     Alcohol/week: 0.0 standard drinks     Comment:  drinks 3 days in a week, 2 beers each time      Family History   Problem Relation Age of Onset     Hypertension Maternal Grandmother      Cerebrovascular Disease Maternal Grandmother      Alzheimer Disease Maternal Grandmother      Arthritis Maternal Grandmother      Obesity Maternal Grandmother      Cancer Other         Colon     Breast Cancer Other      Cancer - colorectal Paternal Grandfather          Review of Systems   Constitutional, HEENT, cardiovascular, pulmonary and  gi systems are negative, except as otherwise noted.      Objective    /66   Pulse 92   Temp 98.3  F (36.8  C) (Oral)   Wt 75.3 kg (166 lb)   LMP 01/03/2014   SpO2 100%   BMI 23.90 kg/m    Body mass index is 23.9 kg/m .  Physical Exam   GENERAL: healthy, alert and no distress  RESP: lungs clear to auscultation - no rales, rhonchi or wheezes  CV: regular rate and rhythm, no murmur, click or rub, no peripheral edema  MS: no gross musculoskeletal defects noted, no edema  NEURO: Normal strength and tone, mentation intact and speech normal  PSYCH: mentation appears normal, affect normal/bright    Diagnostic Test Results:  Results for orders placed or performed in visit on 12/27/19   UA reflex to Microscopic and Culture     Status: Abnormal   Result Value Ref Range    Color Urine Yellow     Appearance Urine Clear     Glucose Urine Negative NEG^Negative mg/dL    Bilirubin Urine Negative NEG^Negative    Ketones Urine Trace (A) NEG^Negative mg/dL    Specific Gravity Urine 1.015 1.003 - 1.035    Blood Urine Trace (A) NEG^Negative    pH Urine 7.0 5.0 - 7.0 pH    Protein Albumin Urine Negative NEG^Negative mg/dL    Urobilinogen Urine 0.2 0.2 - 1.0 EU/dL    Nitrite Urine Negative NEG^Negative    Leukocyte Esterase Urine Large (A) NEG^Negative    Source Midstream Urine    Urine Microscopic     Status: Abnormal   Result Value Ref Range    WBC Urine 10-25 (A) OTO5^0 - 5 /HPF    RBC Urine 2-5 (A) OTO2^O - 2 /HPF    Squamous Epithelial /LPF Urine Few FEW^Few /LPF    Bacteria Urine Moderate (A) NEG^Negative /HPF   Wet prep     Status: None   Result Value Ref Range    Specimen Description Vagina     Wet Prep No Trichomonas seen     Wet Prep No clue cells seen     Wet Prep No yeast seen     Wet Prep Rare  WBC'S seen      Urine Culture Aerobic Bacterial     Status: None (Preliminary result)   Result Value Ref Range    Specimen Description Midstream Urine     Culture Micro       Referred to [facility], await final report.     Assessment & Plan     Marlin was seen today for a.d.h.d and uti.    Diagnoses and all orders for this visit:    Pelvic pain in female  -     UA reflex to Microscopic and Culture  -     Wet prep  -     Neisseria gonorrhoeae PCR  -     Chlamydia trachomatis PCR  -     Urine Microscopic    Recurrent cold sores       -    Has enough pills at this time.    Wheezing  -     albuterol (PROAIR HFA/PROVENTIL HFA/VENTOLIN HFA) 108 (90 Base) MCG/ACT inhaler; Inhale 2 puffs into the lungs every 6 hours    Attention deficit hyperactivity disorder (ADHD), predominantly inattentive type      -   Had refills recently    Fibromyalgia  -     cyclobenzaprine (FLEXERIL) 10 MG tablet; Take 1 tablet (10 mg) by mouth At Bedtime    Itchy eyes  -     fexofenadine (ALLEGRA) 180 MG tablet; Take 1 tablet (180 mg) by mouth daily  -     ketotifen (ZADITOR) 0.025 % ophthalmic solution; Place 1 drop into both eyes 2 times daily    Fibromyalgia muscle pain  -     gabapentin (NEURONTIN) 300 MG capsule; TAKE 1 CAPSULE(300 MG) BY MOUTH THREE TIMES DAILY    Scalp itch    BV (bacterial vaginosis)  -     metroNIDAZOLE (METROGEL) 0.75 % vaginal gel; Place 1 applicator vaginally for 5 nights before bed.    Insomnia, unspecified type  -     traZODone (DESYREL) 50 MG tablet; TAKE 1 TABLET(50 MG) BY MOUTH AT BEDTIME    Herpes labialis  -     valACYclovir (VALTREX) 1000 mg tablet; TAKE 2 TABLETS(2000 MG) BY MOUTH TWICE DAILY    Tobacco use disorder  -     nicotine (NICODERM CQ) 21 MG/24HR 24 hr patch; Place 1 patch onto the skin every 24 hours    Breast cancer screening  -     MA SCREENING DIGITAL BILAT - Future  (s+30); Future    Acute cystitis with hematuria  -     nitroFURantoin macrocrystal-monohydrate (MACROBID) 100 MG capsule; Take 1 capsule (100 mg) by mouth 2 times daily    Nonspecific finding on examination of urine  -     Urine Culture Aerobic Bacterial      Return in about 3 months (around 3/27/2020) for Physical  Exam.    Zach Corona MD  [facility]

## 2019-12-29 LAB
BACTERIA SPEC CULT: NORMAL
C TRACH DNA SPEC QL NAA+PROBE: NEGATIVE
N GONORRHOEA DNA SPEC QL NAA+PROBE: NEGATIVE
SPECIMEN SOURCE: NORMAL

## 2020-01-11 ENCOUNTER — TELEPHONE (OUTPATIENT)
Dept: SCHEDULING | Facility: CLINIC | Age: 50
End: 2020-01-11

## 2020-01-11 NOTE — TELEPHONE ENCOUNTER
1/11/2020    Call Regarding Preventive Health Screening Mammogram       Attempt 3    Message on voicemail    Comments:       Outreach   WILFREDO

## 2020-03-11 DIAGNOSIS — F90.9 ATTENTION DEFICIT HYPERACTIVITY DISORDER (ADHD), UNSPECIFIED ADHD TYPE: Primary | ICD-10-CM

## 2020-03-11 RX ORDER — DEXTROAMPHETAMINE SACCHARATE, AMPHETAMINE ASPARTATE, DEXTROAMPHETAMINE SULFATE AND AMPHETAMINE SULFATE 5; 5; 5; 5 MG/1; MG/1; MG/1; MG/1
20 TABLET ORAL 2 TIMES DAILY
Qty: 60 TABLET | Refills: 0 | Status: SHIPPED | OUTPATIENT
Start: 2020-03-11 | End: 2020-04-10

## 2020-04-09 ENCOUNTER — TELEPHONE (OUTPATIENT)
Dept: FAMILY MEDICINE | Facility: CLINIC | Age: 50
End: 2020-04-09

## 2020-04-10 ENCOUNTER — VIRTUAL VISIT (OUTPATIENT)
Dept: FAMILY MEDICINE | Facility: CLINIC | Age: 50
End: 2020-04-10
Payer: COMMERCIAL

## 2020-04-10 DIAGNOSIS — G47.00 INSOMNIA, UNSPECIFIED TYPE: ICD-10-CM

## 2020-04-10 DIAGNOSIS — B00.1 RECURRENT COLD SORES: ICD-10-CM

## 2020-04-10 DIAGNOSIS — B96.89 BV (BACTERIAL VAGINOSIS): ICD-10-CM

## 2020-04-10 DIAGNOSIS — R14.1 FLATULENCE, ERUCTATION, AND GAS PAIN: ICD-10-CM

## 2020-04-10 DIAGNOSIS — R06.2 WHEEZING: ICD-10-CM

## 2020-04-10 DIAGNOSIS — M79.7 FIBROMYALGIA MUSCLE PAIN: ICD-10-CM

## 2020-04-10 DIAGNOSIS — N76.0 ACUTE VAGINITIS: ICD-10-CM

## 2020-04-10 DIAGNOSIS — R14.2 FLATULENCE, ERUCTATION, AND GAS PAIN: ICD-10-CM

## 2020-04-10 DIAGNOSIS — N76.0 BV (BACTERIAL VAGINOSIS): ICD-10-CM

## 2020-04-10 DIAGNOSIS — R14.3 FLATULENCE, ERUCTATION, AND GAS PAIN: ICD-10-CM

## 2020-04-10 DIAGNOSIS — H57.9 ITCHY EYES: ICD-10-CM

## 2020-04-10 DIAGNOSIS — F90.9 ATTENTION DEFICIT HYPERACTIVITY DISORDER (ADHD), UNSPECIFIED ADHD TYPE: ICD-10-CM

## 2020-04-10 DIAGNOSIS — F17.200 TOBACCO USE DISORDER: ICD-10-CM

## 2020-04-10 DIAGNOSIS — M79.7 FIBROMYALGIA: ICD-10-CM

## 2020-04-10 PROCEDURE — 99442 ZZC PHYSICIAN TELEPHONE EVALUATION 11-20 MIN: CPT | Performed by: FAMILY MEDICINE

## 2020-04-10 RX ORDER — METRONIDAZOLE 7.5 MG/G
GEL VAGINAL
Qty: 70 G | Refills: 5 | Status: SHIPPED | OUTPATIENT
Start: 2020-04-10 | End: 2020-11-04

## 2020-04-10 RX ORDER — NICOTINE 21 MG/24HR
1 PATCH, TRANSDERMAL 24 HOURS TRANSDERMAL EVERY 24 HOURS
Qty: 30 PATCH | Refills: 2 | Status: SHIPPED | OUTPATIENT
Start: 2020-04-10 | End: 2021-02-03

## 2020-04-10 RX ORDER — CYCLOBENZAPRINE HCL 10 MG
TABLET ORAL
Qty: 30 TABLET | Refills: 2 | Status: SHIPPED | OUTPATIENT
Start: 2020-04-10 | End: 2021-02-03

## 2020-04-10 RX ORDER — FEXOFENADINE HCL 180 MG/1
180 TABLET ORAL DAILY
Qty: 30 TABLET | Refills: 2 | Status: SHIPPED | OUTPATIENT
Start: 2020-04-10 | End: 2021-02-03

## 2020-04-10 RX ORDER — POLYETHYLENE GLYCOL 3350 17 G/17G
POWDER, FOR SOLUTION ORAL
Qty: 527 G | Refills: 3 | Status: CANCELLED | OUTPATIENT
Start: 2020-04-10

## 2020-04-10 RX ORDER — GABAPENTIN 300 MG/1
CAPSULE ORAL
Qty: 90 CAPSULE | Refills: 2 | Status: SHIPPED | OUTPATIENT
Start: 2020-04-10 | End: 2020-07-27

## 2020-04-10 RX ORDER — ALBUTEROL SULFATE 90 UG/1
2 AEROSOL, METERED RESPIRATORY (INHALATION) EVERY 6 HOURS
Qty: 1 INHALER | Refills: 1 | Status: SHIPPED | OUTPATIENT
Start: 2020-04-10 | End: 2021-02-03

## 2020-04-10 RX ORDER — DEXTROAMPHETAMINE SACCHARATE, AMPHETAMINE ASPARTATE, DEXTROAMPHETAMINE SULFATE AND AMPHETAMINE SULFATE 5; 5; 5; 5 MG/1; MG/1; MG/1; MG/1
20 TABLET ORAL 2 TIMES DAILY
Qty: 60 TABLET | Refills: 0 | Status: SHIPPED | OUTPATIENT
Start: 2020-04-10 | End: 2020-05-15

## 2020-04-10 RX ORDER — IBUPROFEN 800 MG/1
TABLET, FILM COATED ORAL
Qty: 90 TABLET | Refills: 0 | Status: SHIPPED | OUTPATIENT
Start: 2020-04-10 | End: 2020-08-26

## 2020-04-10 RX ORDER — ACYCLOVIR 50 MG/G
CREAM TOPICAL
Qty: 5 G | Refills: 3 | Status: SHIPPED | OUTPATIENT
Start: 2020-04-10 | End: 2021-02-03

## 2020-04-10 RX ORDER — TRAZODONE HYDROCHLORIDE 50 MG/1
TABLET, FILM COATED ORAL
Qty: 30 TABLET | Refills: 1 | Status: SHIPPED | OUTPATIENT
Start: 2020-04-10 | End: 2020-11-07

## 2020-04-10 RX ORDER — CLINDAMYCIN PHOSPHATE 20 MG/G
CREAM VAGINAL
Qty: 40 G | Refills: 0 | Status: CANCELLED | OUTPATIENT
Start: 2020-04-10

## 2020-04-10 NOTE — PROGRESS NOTES
"Subjective     Marlin Lopez is a 50 year old female who is being evaluated via a billable telephone visit.      The patient has been notified of following:     \"This telephone visit will be conducted via a call between you and your physician/provider. We have found that certain health care needs can be provided without the need for a physical exam.  This service lets us provide the care you need with a short phone conversation.  If a prescription is necessary we can send it directly to your pharmacy.  If lab work is needed we can place an order for that and you can then stop by our lab to have the test done at a later time.    Telephone visits are billed at different rates depending on your insurance coverage. During this emergency period, for some insurers they may be billed the same as an in-person visit.  Please reach out to your insurance provider with any questions.    If during the course of the call the physician/provider feels a telephone visit is not appropriate, you will not be charged for this service.\"    Patient has given verbal consent for Telephone visit?  Yes    How would you like to obtain your AVS? E-Mail (inform patient AVS not encrypted)    Marlin Lopez complains of No chief complaint on file.    Refills:  Renew all medications  Reviewed the medications with patient and discussed refills    Back Pain: hurt back yesterday, possibly from lifting, moving very slowly. Using ice and heat.    Mammogram; held at this time     Renew all   ALLERGIES  Animal dander; Cats; and Codeine       Assessment/Plan:  1. Recurrent cold sores  - acyclovir (ZOVIRAX) 5 % external cream; Apply topically 5 times daily  Dispense: 5 g; Refill: 3    2. Wheezing  - albuterol (PROAIR HFA/PROVENTIL HFA/VENTOLIN HFA) 108 (90 Base) MCG/ACT inhaler; Inhale 2 puffs into the lungs every 6 hours  Dispense: 1 Inhaler; Refill: 1    3. Attention deficit hyperactivity disorder (ADHD), unspecified ADHD type  - " amphetamine-dextroamphetamine (ADDERALL) 20 MG tablet; Take 1 tablet (20 mg) by mouth 2 times daily  Dispense: 60 tablet; Refill: 0    5. Fibromyalgia  - cyclobenzaprine (FLEXERIL) 10 MG tablet; TAKE 1 TABLET(10 MG) BY MOUTH AT BEDTIME  Dispense: 30 tablet; Refill: 2  - ibuprofen (ADVIL/MOTRIN) 800 MG tablet; TAKE 1 TABLET(800 MG) BY MOUTH EVERY 8 HOURS AS NEEDED FOR MODERATE PAIN  Dispense: 90 tablet; Refill: 0    6. Itchy eyes  - fexofenadine (ALLEGRA) 180 MG tablet; Take 1 tablet (180 mg) by mouth daily  Dispense: 30 tablet; Refill: 2  - ketotifen (ZADITOR) 0.025 % ophthalmic solution; Place 1 drop into both eyes 2 times daily  Dispense: 1 Bottle; Refill: 1    7. Fibromyalgia muscle pain  - gabapentin (NEURONTIN) 300 MG capsule; TAKE 1 CAPSULE(300 MG) BY MOUTH THREE TIMES DAILY  Dispense: 90 capsule; Refill: 2    8. BV (bacterial vaginosis)   Symptoms recurrimng  - metroNIDAZOLE (METROGEL) 0.75 % vaginal gel; Place 1 applicator vaginally for 5 nights before bed.  Dispense: 70 g; Refill: 5    9. Tobacco use disorder    Working on quitting  - nicotine (NICODERM CQ) 21 MG/24HR 24 hr patch; Place 1 patch onto the skin every 24 hours  Dispense: 30 patch; Refill: 2    11. Insomnia, unspecified type  - traZODone (DESYREL) 50 MG tablet; TAKE 1 TABLET(50 MG) BY MOUTH AT BEDTIME  Dispense: 30 tablet; Refill: 1     4:45 PM  5:02 PM    Phone call duration: 17 minutes    Zach Corona MD

## 2020-04-20 ENCOUNTER — VIRTUAL VISIT (OUTPATIENT)
Dept: FAMILY MEDICINE | Facility: CLINIC | Age: 50
End: 2020-04-20
Payer: COMMERCIAL

## 2020-04-20 DIAGNOSIS — J02.9 SORE THROAT: Primary | ICD-10-CM

## 2020-04-20 DIAGNOSIS — H92.01 OTALGIA, RIGHT: ICD-10-CM

## 2020-04-20 PROCEDURE — 99442 ZZC PHYSICIAN TELEPHONE EVALUATION 11-20 MIN: CPT | Performed by: FAMILY MEDICINE

## 2020-04-20 RX ORDER — AZITHROMYCIN 250 MG/1
TABLET, FILM COATED ORAL
Qty: 6 TABLET | Refills: 0 | Status: SHIPPED | OUTPATIENT
Start: 2020-04-20 | End: 2020-05-12

## 2020-04-21 ENCOUNTER — TELEPHONE (OUTPATIENT)
Dept: FAMILY MEDICINE | Facility: CLINIC | Age: 50
End: 2020-04-21

## 2020-04-21 NOTE — TELEPHONE ENCOUNTER
"Called and spoke with patient and gave information. She states \"No! No! No! I need an order from Dr. Corona saying it's OK for me to be tested and have it emailed to me.\" states there are outside testing centers in [place], [place] available but would need a written order she thinks.  Advised patient we cannot email anything to her and if approved by Dr. Corona, would need a fax to send order to.   Patient attempted to look up information online for more information. States she will call RN Hotline back if she figures out more information.     Lili López, RN  "

## 2020-04-21 NOTE — TELEPHONE ENCOUNTER
Reason for call:  Order   Order or referral being requested:  Order for covid 19 testing in [place]     Reason for request: not feeling well    Date needed: as soon as possible  Has the patient been seen by the PCP for this problem? YES    Additional comments:  Phone visit yesterday     Phone number to reach patient:  Home number on file [phone] (home)    Best Time:   Any     Can we leave a detailed message on this number?  YES    Travel screening: Not Applicable

## 2020-04-21 NOTE — TELEPHONE ENCOUNTER
Patient completed virtual visit yesterday. States she now wants orders for Covid testing but patient is currently in [place].  Can orders be placed for this or should she contact locations in [place] to determine? Unsure [place]'s rules for testing patients.    Lili López RN

## 2020-05-12 ENCOUNTER — VIRTUAL VISIT (OUTPATIENT)
Dept: FAMILY MEDICINE | Facility: CLINIC | Age: 50
End: 2020-05-12
Payer: COMMERCIAL

## 2020-05-12 ENCOUNTER — TELEPHONE (OUTPATIENT)
Dept: FAMILY MEDICINE | Facility: CLINIC | Age: 50
End: 2020-05-12

## 2020-05-12 DIAGNOSIS — M79.7 FIBROMYALGIA: ICD-10-CM

## 2020-05-12 DIAGNOSIS — N89.8 VAGINAL DISCHARGE: Primary | ICD-10-CM

## 2020-05-12 PROCEDURE — 99442 ZZC PHYSICIAN TELEPHONE EVALUATION 11-20 MIN: CPT | Performed by: PHYSICIAN ASSISTANT

## 2020-05-12 RX ORDER — FLUCONAZOLE 150 MG/1
150 TABLET ORAL ONCE
Qty: 1 TABLET | Refills: 0 | Status: SHIPPED | OUTPATIENT
Start: 2020-05-12 | End: 2020-05-12

## 2020-05-12 RX ORDER — DULOXETIN HYDROCHLORIDE 30 MG/1
30 CAPSULE, DELAYED RELEASE ORAL DAILY
Qty: 30 CAPSULE | Refills: 1 | Status: SHIPPED | OUTPATIENT
Start: 2020-05-12 | End: 2020-07-26

## 2020-05-12 ASSESSMENT — PATIENT HEALTH QUESTIONNAIRE - PHQ9: SUM OF ALL RESPONSES TO PHQ QUESTIONS 1-9: 3

## 2020-05-12 NOTE — TELEPHONE ENCOUNTER
Reason for call:  Other   Patient called regarding (reason for call): call back  Additional comments: Patient is calling because she wants a medication for her yeast infection and wants to discuss the medication Humira. Please call back     Phone number to reach patient:  Home number on file [phone] (home)    Best Time:  any    Can we leave a detailed message on this number?  YES    Travel screening: Not Applicable

## 2020-05-12 NOTE — TELEPHONE ENCOUNTER
Patient does not have current appointment scheduled. Please call and schedule appointment for symptoms and medication request for fibromyalgia.     Lili López RN

## 2020-05-12 NOTE — TELEPHONE ENCOUNTER
Patient needs to schedule visit with PCP regarding fibromyalgia scripts.  E visit is ok for vaginal sx.  Marito LORENZO

## 2020-05-12 NOTE — TELEPHONE ENCOUNTER
Called and spoke with patient.     She is scheduled with a provider today 05/12/20 @ 3:00 pm. Vani Mathias,

## 2020-05-12 NOTE — PROGRESS NOTES
"Marlin Lopez is a 50 year old female who is being evaluated via a billable video visit.      The patient has been notified of following:     \"This video visit will be conducted via a call between you and your physician/provider. We have found that certain health care needs can be provided without the need for an in-person physical exam.  This service lets us provide the care you need with a video conversation.  If a prescription is necessary we can send it directly to your pharmacy.  If lab work is needed we can place an order for that and you can then stop by our lab to have the test done at a later time.    Video visits are billed at different rates depending on your insurance coverage.  Please reach out to your insurance provider with any questions.    If during the course of the call the physician/provider feels a video visit is not appropriate, you will not be charged for this service.\"    Patient has given verbal consent for Video visit? Yes    How would you like to obtain your AVS? Patient request no copy of AVS.     Patient would like the video invitation sent by: Text to cell phone: [phone]    Will anyone else be joining your video visit? No    Visit conducted by Telephone.    Subjective     Marlin Lopez is a 50 year old female who presents today via video visit for the following health issues:    HPI   Chief Complaint   Patient presents with     Vaginal Problem     Fibromyalgia     would like to discuss taking Humira         Vaginal Symptoms      Duration: few weeks    Description  vaginal discharge - yellowish, odor and abdominal cramping.     Intensity:  moderate    Accompanying signs and symptoms (fever/dysuria/abdominal or back pain): back pain.    History  Sexually active: yes, single partner, contraception not required  Possibility of pregnancy: No  Recent antibiotic use: YES Azithromycin 4/20/2020    Precipitating or alleviating factors: None    Therapies tried and outcome: none   " "Outcome: NA    Patient has had both BV and vaginal candidiasis and believes that she currently has a yeast infection.  She is in [place] and unable to present for labs.  Would like to restart Cymbalta after reviewing that Humira isn't recommended for the treatment of Fibromyalgia.  She will temporarily Hold Trazodone for now.     Review of Systems   Constitutional, HEENT, cardiovascular, pulmonary, gi and gu systems are negative, except as otherwise noted.      Objective    LMP 01/03/2014   Estimated body mass index is 23.9 kg/m  as calculated from the following:    Height as of 9/13/19: 1.775 m (5' 9.88\").    Weight as of 12/27/19: 75.3 kg (166 lb).       Assessment & Plan     1. Vaginal discharge  - fluconazole (DIFLUCAN) 150 MG tablet; Take 1 tablet (150 mg) by mouth once for 1 dose  Dispense: 1 tablet; Refill: 0    2. Fibromyalgia  - DULoxetine (CYMBALTA) 30 MG capsule; Take 1 capsule (30 mg) by mouth daily  Dispense: 30 capsule; Refill: 1   -Consider an increase to BID at follow up.       Return in about 4 weeks (around 6/9/2020).    Ban Gutierrez PA-C  [facility]      Total Telephone time: 15 Minutes.     Ban Gutierrez PA-C        "

## 2020-05-12 NOTE — TELEPHONE ENCOUNTER
"Called and spoke with patient. Patient is currently living in [place]. States she was put on an abx by Dr. Corona for sore throat and now thinks she has a yeast infection. States she is at work and can't get into much detail. Advised her an appointment is needed. Patient states \"there isn't much to discuss, I need what I need and need a medication\". Patient states she is also interested in getting started on Humira to help with her fibromyalgia. Again advised patient an appointment is needed and that Dr. Corona is out of office this week. She again disagreed. Advised message would be sent to covering providers.   [facility] [place] [place].    Lili López RN  "

## 2020-05-14 DIAGNOSIS — F90.9 ATTENTION DEFICIT HYPERACTIVITY DISORDER (ADHD), UNSPECIFIED ADHD TYPE: ICD-10-CM

## 2020-05-14 NOTE — TELEPHONE ENCOUNTER
Controlled Substance Refill Request for amphetamine-dextroamphetamine (ADDERALL) 20 MG tablet   Problem List Complete:  Yes   checked in past 3 months?  No, route to RN  ADHD (attention deficit hyperactivity disorder)   Problem Detail     Priority:  Medium    Overview Signed 11/10/2017  1:45 PM by Zach Corona MD    Patient is followed by Data Unavailable for ongoing controlled substance prescription. Med: Adderall IR.   Maximum use per month: 60  Expected duration: 30 days  Controlled substance agreement on file: YES  Clinic visit recommended: Q 3 months      Relevant Medications     amphetamine-dextroamphetamine (ADDERALL) 20 MG tablet    Last Encounter with ADHD (attention deficit hyperactivity disorder)     Visit Information      Provider  Department  Center    4/10/2020  Zach Corona MD  [facility]

## 2020-05-14 NOTE — TELEPHONE ENCOUNTER
Reason for call:  Medication   If this is a refill request, has the caller requested the refill from the pharmacy already? No  Will the patient be using a [facility]? No  Name of the pharmacy and phone number for the current request: [facility] [place], [phone]    Name of the medication requested: Adderall     Other request: n/a     Phone number to reach patient:  Home number on file [phone] (home)    Best Time:  Any     Can we leave a detailed message on this number?  YES    Travel screening: Not Applicable

## 2020-05-15 RX ORDER — DEXTROAMPHETAMINE SACCHARATE, AMPHETAMINE ASPARTATE, DEXTROAMPHETAMINE SULFATE AND AMPHETAMINE SULFATE 5; 5; 5; 5 MG/1; MG/1; MG/1; MG/1
20 TABLET ORAL 2 TIMES DAILY
Qty: 60 TABLET | Refills: 0 | Status: SHIPPED | OUTPATIENT
Start: 2020-05-15 | End: 2020-06-23

## 2020-05-15 NOTE — TELEPHONE ENCOUNTER
Routing refill request to provider for review/approval because:  Drug not on the [facility] refill [facility]    last checked 3/10/2020    Requested Prescriptions   Pending Prescriptions Disp Refills     amphetamine-dextroamphetamine (ADDERALL) 20 MG tablet 60 tablet 0     Sig: Take 1 tablet (20 mg) by mouth 2 times daily       There is no refill protocol information for this order        Lili López, RN

## 2020-06-19 DIAGNOSIS — F90.9 ATTENTION DEFICIT HYPERACTIVITY DISORDER (ADHD), UNSPECIFIED ADHD TYPE: ICD-10-CM

## 2020-06-19 NOTE — TELEPHONE ENCOUNTER
Reason for call:  Medication   If this is a refill request, has the caller requested the refill from the pharmacy already? No  Will the patient be using a [facility]? No  Name of the pharmacy and phone number for the current request: [facility] [place], [phone]    Name of the medication requested: Adderall     Other request: n/a     Phone number to reach patient:  Home number on file [phone] (home)    Best Time:  Any     Can we leave a detailed message on this number?  YES    Travel screening: Not Applicable

## 2020-06-22 NOTE — TELEPHONE ENCOUNTER
Routing refill request to provider for review/approval because:  Drug not on the [facility] refill protocol    checked - last dispensed on 4/10/2020    Requested Prescriptions   Pending Prescriptions Disp Refills     amphetamine-dextroamphetamine (ADDERALL) 20 MG tablet 60 tablet 0     Sig: Take 1 tablet (20 mg) by mouth 2 times daily       There is no refill protocol information for this order        Lili López RN

## 2020-06-23 RX ORDER — DEXTROAMPHETAMINE SACCHARATE, AMPHETAMINE ASPARTATE, DEXTROAMPHETAMINE SULFATE AND AMPHETAMINE SULFATE 5; 5; 5; 5 MG/1; MG/1; MG/1; MG/1
20 TABLET ORAL 2 TIMES DAILY
Qty: 60 TABLET | Refills: 0 | Status: SHIPPED | OUTPATIENT
Start: 2020-06-23 | End: 2020-07-21

## 2020-07-01 ENCOUNTER — TELEPHONE (OUTPATIENT)
Dept: FAMILY MEDICINE | Facility: CLINIC | Age: 50
End: 2020-07-01

## 2020-07-01 DIAGNOSIS — N76.0 ACUTE VAGINITIS: Primary | ICD-10-CM

## 2020-07-01 RX ORDER — FLUCONAZOLE 150 MG/1
150 TABLET ORAL ONCE
Qty: 1 TABLET | Refills: 0 | Status: SHIPPED | OUTPATIENT
Start: 2020-07-01 | End: 2020-07-01

## 2020-07-01 NOTE — TELEPHONE ENCOUNTER
Please see message below. Patient is out of state and RN unable to triage. Are you able to do a telephone visit for symptoms or no because of licensure restrictions between states? Advise her to go to local UC?    Lili López RN

## 2020-07-01 NOTE — TELEPHONE ENCOUNTER
Reason for call:  Symptom   Symptom or request: bacterial infection, yeast infection.     Duration (how long have symptoms been present):  4 days  Have you been treated for this before? No    Additional comments: still in [place]    Phone number to reach patient:  Home number on file [phone] (home)    Best Time:   Any     Can we leave a detailed message on this number?  YES    Travel screening: Not Applicable     Telephone call to patient. Informed of normal urinalysis.    She is reminded of mammogram scheduled for tomorrow morning at 7:30am. She should have labs drawn at [facility] after mammogram is complete. She will be notified of results when available.     Also again reminded of importance of seeing pulmonologist due to lung nodules. She is encouraged to call to schedule with Dr. Tello.    Patient verbalizes good understanding and agrees.

## 2020-07-20 ENCOUNTER — TELEPHONE (OUTPATIENT)
Dept: FAMILY MEDICINE | Facility: CLINIC | Age: 50
End: 2020-07-20

## 2020-07-20 DIAGNOSIS — F90.9 ATTENTION DEFICIT HYPERACTIVITY DISORDER (ADHD), UNSPECIFIED ADHD TYPE: ICD-10-CM

## 2020-07-20 NOTE — TELEPHONE ENCOUNTER
[place] ([place] reviewed:  reviewed- no concerns    [place]:  [email or web address]    Ashley Link RN

## 2020-07-20 NOTE — TELEPHONE ENCOUNTER
Controlled Substance Refill Request for amphetamine-dextroamphetamine (ADDERALL) 20 MG tablet  Problem List Complete:  Yes   checked in past 3 months?  No, route to RN    ADHD (attention deficit hyperactivity disorder)   Problem Detail     Priority:  Medium    Overview Signed 11/10/2017  1:45 PM by Zach Corona MD    Patient is followed by Data Unavailable for ongoing controlled substance prescription. Med: Adderall IR.   Maximum use per month: 60  Expected duration: 30 days  Controlled substance agreement on file: YES  Clinic visit recommended: Q 3 months      Relevant Medications     amphetamine-dextroamphetamine (ADDERALL) 20 MG tablet    Last Encounter with ADHD (attention deficit hyperactivity disorder)     Visit Information      Provider  Department  Center    6/19/2020  Zach Corona MD  [facility]

## 2020-07-21 RX ORDER — DEXTROAMPHETAMINE SACCHARATE, AMPHETAMINE ASPARTATE, DEXTROAMPHETAMINE SULFATE AND AMPHETAMINE SULFATE 5; 5; 5; 5 MG/1; MG/1; MG/1; MG/1
20 TABLET ORAL 2 TIMES DAILY
Qty: 60 TABLET | Refills: 0 | Status: SHIPPED | OUTPATIENT
Start: 2020-07-22 | End: 2020-08-26

## 2020-07-25 DIAGNOSIS — M79.7 FIBROMYALGIA MUSCLE PAIN: ICD-10-CM

## 2020-07-25 DIAGNOSIS — M79.7 FIBROMYALGIA: ICD-10-CM

## 2020-07-26 RX ORDER — DULOXETIN HYDROCHLORIDE 30 MG/1
CAPSULE, DELAYED RELEASE ORAL
Qty: 30 CAPSULE | Refills: 0 | Status: SHIPPED | OUTPATIENT
Start: 2020-07-26 | End: 2020-08-25

## 2020-07-26 NOTE — TELEPHONE ENCOUNTER
Medication is being filled for 1 time refill only due to:  Patient needs to be seen because need recheck.   Kristin Vela RN

## 2020-07-27 RX ORDER — GABAPENTIN 300 MG/1
CAPSULE ORAL
Qty: 90 CAPSULE | Refills: 2 | Status: SHIPPED | OUTPATIENT
Start: 2020-07-27 | End: 2021-09-27

## 2020-08-24 DIAGNOSIS — M79.7 FIBROMYALGIA: ICD-10-CM

## 2020-08-25 DIAGNOSIS — F90.9 ATTENTION DEFICIT HYPERACTIVITY DISORDER (ADHD), UNSPECIFIED ADHD TYPE: ICD-10-CM

## 2020-08-25 DIAGNOSIS — M79.7 FIBROMYALGIA: ICD-10-CM

## 2020-08-25 RX ORDER — IBUPROFEN 800 MG/1
TABLET, FILM COATED ORAL
Qty: 90 TABLET | Refills: 0 | OUTPATIENT
Start: 2020-08-25

## 2020-08-25 RX ORDER — DULOXETIN HYDROCHLORIDE 30 MG/1
CAPSULE, DELAYED RELEASE ORAL
Qty: 30 CAPSULE | Refills: 0 | Status: SHIPPED | OUTPATIENT
Start: 2020-08-25 | End: 2020-09-29

## 2020-08-25 NOTE — TELEPHONE ENCOUNTER
Duplicated request of medication.     Please remove and close. ibuprofen (ADVIL/MOTRIN) 800 MG tablet    See Telephone encounter 08/25/20. Vani Mathias,

## 2020-08-25 NOTE — TELEPHONE ENCOUNTER
Reason for call:  Medication   If this is a refill request, has the caller requested the refill from the pharmacy already? Yes  Will the patient be using a [facility]? No  Name of the pharmacy and phone number for the current request: [facility][id number] [place] [id number] [facility] AT [facility]  [phone]    Name of the medication requested: Adderall and Ibuprofen     Other request: please call    Phone number to reach patient:  Home number on file [phone] (home)    Best Time:  any    Can we leave a detailed message on this number?  YES    Travel screening: Not Applicable

## 2020-08-25 NOTE — TELEPHONE ENCOUNTER
Patient Call: Voicemail  Date/Time: 8/25/20 2:25PM  Reason for call: Pt's S.O. calling on behalf of pt. Please have transplant coordinatorKiana, call back.      Signed Prescriptions:                        Disp   Refills    sertraline (ZOLOFT) 100 MG tablet          30 tab*0        Sig: Take 1 tablet (100 mg) by mouth daily office visit           for further refills  Authorizing Provider: CORY FRANCO  Ordering User: AUGUSTA BOB    Refused Prescriptions:                       Disp   Refills    sertraline (ZOLOFT) 100 MG tablet [Pharmac*30 tab*0        Sig: Take 1 tablet (100 mg) by mouth daily  Refused By: JENNY ART  Reason for Refusal: OTHER  Reason for Refusal Comment: needs PHQ9    Refilled per provider written order, 1 month supply. Augusta Bob RN ............   10/31/2017...11:05 AM

## 2020-08-25 NOTE — TELEPHONE ENCOUNTER
Controlled Substance Refill Request for amphetamine-dextroamphetamine (ADDERALL) 20 MG tablet  Problem List Complete:  Yes   checked in past 3 months?  Yes 7/20/2020    ADHD (attention deficit hyperactivity disorder)   Problem Detail     Priority:  Medium    Overview Addendum 7/20/2020  2:00 PM by Ashley Rodriguez RN    Patient is followed by Data Unavailable for ongoing controlled substance prescription. Med: Adderall IR.   Maximum use per month: 60  Expected duration: 30 days  Controlled substance agreement on file: YES  Clinic visit recommended: Q 3 months   checked: 7/20/20   Previous Version    Relevant Medications     amphetamine-dextroamphetamine (ADDERALL) 20 MG tablet    Last Encounter with ADHD (attention deficit hyperactivity disorder)     Visit Information      Provider  Department  Center    7/20/2020  Zach Corona MD  [facility]

## 2020-08-26 RX ORDER — IBUPROFEN 800 MG/1
TABLET, FILM COATED ORAL
Qty: 90 TABLET | Refills: 0 | Status: SHIPPED | OUTPATIENT
Start: 2020-08-26 | End: 2021-02-03

## 2020-08-26 RX ORDER — DEXTROAMPHETAMINE SACCHARATE, AMPHETAMINE ASPARTATE, DEXTROAMPHETAMINE SULFATE AND AMPHETAMINE SULFATE 5; 5; 5; 5 MG/1; MG/1; MG/1; MG/1
20 TABLET ORAL 2 TIMES DAILY
Qty: 60 TABLET | Refills: 0 | Status: SHIPPED | OUTPATIENT
Start: 2020-08-26 | End: 2020-10-09

## 2020-09-22 DIAGNOSIS — M79.7 FIBROMYALGIA: ICD-10-CM

## 2020-09-23 NOTE — TELEPHONE ENCOUNTER
Called patient and left VM to call clinic. Please help schedule med check (DULoxetine) appointment if patient returns call.  Clementina COOLEY CMA ([facility])

## 2020-09-29 RX ORDER — DULOXETIN HYDROCHLORIDE 30 MG/1
30 CAPSULE, DELAYED RELEASE ORAL DAILY
Qty: 90 CAPSULE | Refills: 0 | Status: SHIPPED | OUTPATIENT
Start: 2020-09-29 | End: 2020-11-07

## 2020-09-29 NOTE — TELEPHONE ENCOUNTER
Called and spoke to patient. Informed patient of message. Patient verbally understand. Patient stated that she is out of town right now and does not want to schedule an appointment right now.   Stacy Germain, CHAVA

## 2020-10-01 DIAGNOSIS — F90.9 ATTENTION DEFICIT HYPERACTIVITY DISORDER (ADHD), UNSPECIFIED ADHD TYPE: ICD-10-CM

## 2020-10-01 NOTE — TELEPHONE ENCOUNTER
Reason for call:  Medication   If this is a refill request, has the caller requested the refill from the pharmacy already? No  Will the patient be using a [facility]? No  Name of the pharmacy and phone number for the current request: [facility] #[id number] [place] [id number] AT [facility]  P: [phone]  F: [phone]      Name of the medication requested: amphetamine-dextroamphetamine (ADDERALL) 20 MG tablet    Other request: call patient back     Phone number to reach patient:  Home number on file [phone] (home)    Best Time:  any    Can we leave a detailed message on this number?  YES    Travel screening: Negative

## 2020-10-01 NOTE — TELEPHONE ENCOUNTER
Controlled Substance Refill Request for amphetamine-dextroamphetamine (ADDERALL) 20 MG tablet  Problem List Complete:  Yes   checked in past 3 months?  Yes 7/20/2020    ADHD (attention deficit hyperactivity disorder)  Problem Detail    Priority:  Medium   Overview Addendum 7/20/2020  2:00 PM by Ashley Rodriguez RN   Patient is followed by Data Unavailable for ongoing controlled substance prescription. Med: Adderall IR.   Maximum use per month: 60  Expected duration: 30 days  Controlled substance agreement on file: YES  Clinic visit recommended: Q 3 months   checked: 7/20/20   Previous Version   Relevant Medications    amphetamine-dextroamphetamine (ADDERALL) 20 MG tablet   Last Encounter with ADHD (attention deficit hyperactivity disorder)    Visit Information     Provider Department Center   8/25/2020 Zach Corona MD [facility]   Diagnoses     Codes Comments   Attention deficit hyperactivity disorder (ADHD), unspecified ADHD type  F90.9    Fibromyalgia  M79.7

## 2020-10-02 RX ORDER — DEXTROAMPHETAMINE SACCHARATE, AMPHETAMINE ASPARTATE, DEXTROAMPHETAMINE SULFATE AND AMPHETAMINE SULFATE 5; 5; 5; 5 MG/1; MG/1; MG/1; MG/1
20 TABLET ORAL 2 TIMES DAILY
Start: 2020-10-02

## 2020-10-08 ENCOUNTER — TELEPHONE (OUTPATIENT)
Dept: FAMILY MEDICINE | Facility: CLINIC | Age: 50
End: 2020-10-08

## 2020-10-08 DIAGNOSIS — F90.9 ATTENTION DEFICIT HYPERACTIVITY DISORDER (ADHD), UNSPECIFIED ADHD TYPE: ICD-10-CM

## 2020-10-08 NOTE — TELEPHONE ENCOUNTER
Reason for call:  Medication   If this is a refill request, has the caller requested the refill from the pharmacy already? No  Will the patient be using a [facility]? No  Name of the pharmacy and phone number for the current request: [facility] #[id number] - [place], [place] - [address] AT [facility] [address]   [phone]    Name of the medication requested: Adderall    Other request: refill request per patient    Phone number to reach patient:  Home number on file [phone] (home)    Best Time:  any    Can we leave a detailed message on this number?  NO    Travel screening: Not Applicable

## 2020-10-08 NOTE — TELEPHONE ENCOUNTER
Called and spoke with     Patient will be in [place] until she will return. She is unable to schedule to come in and she was not able to give a date of return.     Updated the pharmacy to [id number] - [place], [place] - [address] AT [facility] [address] per patient's request.     Patient she said she could schedule in January 2021    Outpatient Medication Detail     Disp Refills Start End SCOTT   amphetamine-dextroamphetamine (ADDERALL) 20 MG tablet   10/2/2020  No   Request refused: Patient needs appointment   Sig - Route: Take 1 tablet (20 mg) by mouth 2 times daily - Oral   Class: Denied   Earliest Fill Date: 10/2/2020   Order: [id number]   Printout Tracking    External Result Report   Pharmacy    [facility] #[id number] - [place] - [address] [facility] [address]        Controlled Substance Refill Request for amphetamine-dextroamphetamine (ADDERALL) 20 MG tablet  Problem List Complete:  Yes   checked in past 3 months?  Yes 7/20/2020     ADHD (attention deficit hyperactivity disorder)  Problem Detail     Priority:  Medium   Overview Addendum 7/20/2020  2:00 PM by Ashley Rodriguez RN   Patient is followed by Data Unavailable for ongoing controlled substance prescription. Med: Adderall IR.   Maximum use per month: 60  Expected duration: 30 days  Controlled substance agreement on file: YES  Clinic visit recommended: Q 3 months   checked: 7/20/20   Previous Version   Relevant Medications     amphetamine-dextroamphetamine (ADDERALL) 20 MG tablet   Last Encounter with ADHD (attention deficit hyperactivity disorder)     Visit Information       Provider Department Center   8/25/2020 Zach Corona MD [facility]   Diagnoses       Codes Comments   Attention deficit hyperactivity disorder (ADHD), unspecified ADHD type  F90.9     Fibromyalgia  M79.7

## 2020-10-09 RX ORDER — DEXTROAMPHETAMINE SACCHARATE, AMPHETAMINE ASPARTATE, DEXTROAMPHETAMINE SULFATE AND AMPHETAMINE SULFATE 5; 5; 5; 5 MG/1; MG/1; MG/1; MG/1
20 TABLET ORAL 2 TIMES DAILY
Qty: 60 TABLET | Refills: 0 | Status: SHIPPED | OUTPATIENT
Start: 2020-10-09 | End: 2020-11-07

## 2020-10-14 DIAGNOSIS — B00.1 HERPES LABIALIS: ICD-10-CM

## 2020-10-14 RX ORDER — VALACYCLOVIR HYDROCHLORIDE 1 G/1
TABLET, FILM COATED ORAL
Qty: 20 TABLET | Refills: 2 | Status: SHIPPED | OUTPATIENT
Start: 2020-10-14 | End: 2021-02-16

## 2020-10-14 NOTE — TELEPHONE ENCOUNTER
Routing refill request to provider for review/approval because:  Labs not current:  Creat[facility]    Klarissa Vega BSN, RN

## 2020-11-04 NOTE — PROGRESS NOTES
"Marlin Lopez is a 50 year old female who is being evaluated via a billable telephone visit.      The patient has been notified of following:     \"This telephone visit will be conducted via a call between you and your physician/provider. We have found that certain health care needs can be provided without the need for a physical exam.  This service lets us provide the care you need with a short phone conversation.  If a prescription is necessary we can send it directly to your pharmacy.  If lab work is needed we can place an order for that and you can then stop by our lab to have the test done at a later time.    Telephone visits are billed at different rates depending on your insurance coverage. During this emergency period, for some insurers they may be billed the same as an in-person visit.  Please reach out to your insurance provider with any questions.    If during the course of the call the physician/provider feels a telephone visit is not appropriate, you will not be charged for this service.\"    Patient has given verbal consent for Telephone visit?  Yes    What phone number would you like to be contacted at? [phone]    How would you like to obtain your AVS? Mail a copy    Subjective     Marlin Lopez is a 50 year old female who presents via phone visit today for the following health issues:    HPI     Genitourinary - Female  Onset/Duration: 2 days ago  Description:   Painful urination (Dysuria): no           Frequency: YES  Blood in urine (Hematuria): no  Delay in urine (Hesitency): no  Intensity: moderate  Progression of Symptoms:  same  Accompanying Signs & Symptoms:  Fever/chills: no  Flank pain: no lower back hurts  Nausea and vomiting: no  Vaginal symptoms: none  Abdominal/Pelvic Pain: YES  History:   History of frequent UTI s: no  History of kidney stones: no  Sexually Active: YES  Possibility of pregnancy: No  Precipitating or alleviating factors: None  Therapies tried and outcome: " ibuprofen and  WENDY helping a little     Dysuria x 1.5 weeks:  Low back and lower abdominal   Frequency   No dysuria     Insomnia:    Uses trazodone and helping    ADHD:     Needs refill sent to pharmacy.    Allergic Rhinitis    Causes wheezing intermittently and does use albuterol as needed.    Smoking    Smokes about 1 PPD; needing refill for nicotine patches    Fibromyalgia    Stable at this time.    Needs refill for Duloxetine.    Her new pharmacy:  [place], [place]  [address]  [phone]      Assessment & Plan     Marlin was seen today for dysuria.    Diagnoses and all orders for this visit:    Cystitis    Empiric treatment, call back if not improving.  -     sulfamethoxazole-trimethoprim (BACTRIM DS) 800-160 MG tablet; Take 1 tablet by mouth 2 times daily for 3 days    Insomnia, unspecified type  -     traZODone (DESYREL) 50 MG tablet; TAKE 1 TABLET(50 MG) BY MOUTH AT BEDTIME    Scalp itch  -     hydrOXYzine (VISTARIL) 50 MG capsule; Take 1 capsule (50 mg) by mouth 3 times daily as needed for itching    Fibromyalgia  -     DULoxetine (CYMBALTA) 30 MG capsule; Take 1 capsule (30 mg) by mouth daily    Attention deficit hyperactivity disorder (ADHD), unspecified ADHD type  -     amphetamine-dextroamphetamine (ADDERALL) 20 MG tablet; Take 1 tablet (20 mg) by mouth 2 times daily    Wheezing (allergic rhinitis)  -     albuterol (PROAIR RESPICLICK) 108 (90 Base) MCG/ACT inhaler; Inhale 2 puffs into the lungs every 6 hours as needed for shortness of breath / dyspnea or wheezing    Tobacco use  -     nicotine (NICODERM CQ) 21 MG/24HR 24 hr patch; Place 1 patch onto the skin every 24 hours      Return in about 1 month (around 12/6/2020) for Routine Visit.    Zach Corona MD  [facility]    Phone call duration: 14 minutes

## 2020-11-06 ENCOUNTER — VIRTUAL VISIT (OUTPATIENT)
Dept: FAMILY MEDICINE | Facility: CLINIC | Age: 50
End: 2020-11-06
Payer: COMMERCIAL

## 2020-11-06 DIAGNOSIS — Z72.0 TOBACCO USE: ICD-10-CM

## 2020-11-06 DIAGNOSIS — N30.90 CYSTITIS: Primary | ICD-10-CM

## 2020-11-06 DIAGNOSIS — R06.2 WHEEZING: ICD-10-CM

## 2020-11-06 DIAGNOSIS — L29.9 SCALP ITCH: ICD-10-CM

## 2020-11-06 DIAGNOSIS — F90.9 ATTENTION DEFICIT HYPERACTIVITY DISORDER (ADHD), UNSPECIFIED ADHD TYPE: ICD-10-CM

## 2020-11-06 DIAGNOSIS — F32.4 MAJOR DEPRESSIVE DISORDER IN PARTIAL REMISSION, UNSPECIFIED WHETHER RECURRENT (H): ICD-10-CM

## 2020-11-06 DIAGNOSIS — M79.7 FIBROMYALGIA: ICD-10-CM

## 2020-11-06 DIAGNOSIS — G47.00 INSOMNIA, UNSPECIFIED TYPE: ICD-10-CM

## 2020-11-06 PROCEDURE — 99214 OFFICE O/P EST MOD 30 MIN: CPT | Mod: 95 | Performed by: FAMILY MEDICINE

## 2020-11-07 RX ORDER — HYDROXYZINE PAMOATE 50 MG/1
50 CAPSULE ORAL 3 TIMES DAILY PRN
Qty: 30 CAPSULE | Refills: 0 | Status: SHIPPED | OUTPATIENT
Start: 2020-11-07

## 2020-11-07 RX ORDER — TRAZODONE HYDROCHLORIDE 50 MG/1
TABLET, FILM COATED ORAL
Qty: 30 TABLET | Refills: 0 | Status: SHIPPED | OUTPATIENT
Start: 2020-11-07 | End: 2020-12-13

## 2020-11-07 RX ORDER — SULFAMETHOXAZOLE/TRIMETHOPRIM 800-160 MG
1 TABLET ORAL 2 TIMES DAILY
Qty: 6 TABLET | Refills: 0 | Status: SHIPPED | OUTPATIENT
Start: 2020-11-07 | End: 2020-11-10

## 2020-11-07 RX ORDER — NICOTINE 21 MG/24HR
1 PATCH, TRANSDERMAL 24 HOURS TRANSDERMAL EVERY 24 HOURS
Qty: 30 PATCH | Refills: 1 | Status: SHIPPED | OUTPATIENT
Start: 2020-11-07 | End: 2021-02-16

## 2020-11-07 RX ORDER — DEXTROAMPHETAMINE SACCHARATE, AMPHETAMINE ASPARTATE, DEXTROAMPHETAMINE SULFATE AND AMPHETAMINE SULFATE 5; 5; 5; 5 MG/1; MG/1; MG/1; MG/1
20 TABLET ORAL 2 TIMES DAILY
Qty: 60 TABLET | Refills: 0 | Status: SHIPPED | OUTPATIENT
Start: 2020-11-07 | End: 2021-02-03

## 2020-11-07 RX ORDER — DULOXETIN HYDROCHLORIDE 30 MG/1
30 CAPSULE, DELAYED RELEASE ORAL DAILY
Qty: 90 CAPSULE | Refills: 0 | Status: SHIPPED | OUTPATIENT
Start: 2020-11-07 | End: 2021-02-03

## 2020-11-16 ENCOUNTER — TELEPHONE (OUTPATIENT)
Dept: FAMILY MEDICINE | Facility: CLINIC | Age: 50
End: 2020-11-16

## 2020-11-16 DIAGNOSIS — F90.0 ATTENTION DEFICIT HYPERACTIVITY DISORDER (ADHD), PREDOMINANTLY INATTENTIVE TYPE: Primary | ICD-10-CM

## 2020-11-16 NOTE — TELEPHONE ENCOUNTER
Patient states her Santino needs to be sent to [facility], not [facility].  Please re-send and call her when done.    Thank you.

## 2020-11-17 NOTE — TELEPHONE ENCOUNTER
Please resent prescription to [facility] instead of [facility].  Stacy Germain, CMA

## 2020-11-18 DIAGNOSIS — F90.0 ATTENTION DEFICIT HYPERACTIVITY DISORDER (ADHD), PREDOMINANTLY INATTENTIVE TYPE: ICD-10-CM

## 2020-11-18 RX ORDER — DEXTROAMPHETAMINE SACCHARATE, AMPHETAMINE ASPARTATE MONOHYDRATE, DEXTROAMPHETAMINE SULFATE AND AMPHETAMINE SULFATE 5; 5; 5; 5 MG/1; MG/1; MG/1; MG/1
20 CAPSULE, EXTENDED RELEASE ORAL DAILY
Qty: 30 CAPSULE | Refills: 0 | OUTPATIENT
Start: 2020-11-18

## 2020-11-18 RX ORDER — DEXTROAMPHETAMINE SACCHARATE, AMPHETAMINE ASPARTATE MONOHYDRATE, DEXTROAMPHETAMINE SULFATE AND AMPHETAMINE SULFATE 5; 5; 5; 5 MG/1; MG/1; MG/1; MG/1
20 CAPSULE, EXTENDED RELEASE ORAL DAILY
Qty: 30 CAPSULE | Refills: 0 | OUTPATIENT
Start: 2020-12-19

## 2020-11-18 RX ORDER — DEXTROAMPHETAMINE SACCHARATE, AMPHETAMINE ASPARTATE MONOHYDRATE, DEXTROAMPHETAMINE SULFATE AND AMPHETAMINE SULFATE 5; 5; 5; 5 MG/1; MG/1; MG/1; MG/1
20 CAPSULE, EXTENDED RELEASE ORAL DAILY
Qty: 30 CAPSULE | Refills: 0 | Status: SHIPPED | OUTPATIENT
Start: 2020-11-18 | End: 2020-11-24

## 2020-11-18 RX ORDER — DEXTROAMPHETAMINE SACCHARATE, AMPHETAMINE ASPARTATE MONOHYDRATE, DEXTROAMPHETAMINE SULFATE AND AMPHETAMINE SULFATE 5; 5; 5; 5 MG/1; MG/1; MG/1; MG/1
20 CAPSULE, EXTENDED RELEASE ORAL DAILY
Qty: 30 CAPSULE | Refills: 0 | Status: SHIPPED | OUTPATIENT
Start: 2021-01-19 | End: 2020-11-24

## 2020-11-18 RX ORDER — DEXTROAMPHETAMINE SACCHARATE, AMPHETAMINE ASPARTATE MONOHYDRATE, DEXTROAMPHETAMINE SULFATE AND AMPHETAMINE SULFATE 5; 5; 5; 5 MG/1; MG/1; MG/1; MG/1
20 CAPSULE, EXTENDED RELEASE ORAL DAILY
Qty: 30 CAPSULE | Refills: 0 | Status: SHIPPED | OUTPATIENT
Start: 2020-12-19 | End: 2020-11-24

## 2020-11-18 RX ORDER — DEXTROAMPHETAMINE SACCHARATE, AMPHETAMINE ASPARTATE MONOHYDRATE, DEXTROAMPHETAMINE SULFATE AND AMPHETAMINE SULFATE 5; 5; 5; 5 MG/1; MG/1; MG/1; MG/1
20 CAPSULE, EXTENDED RELEASE ORAL DAILY
Qty: 30 CAPSULE | Refills: 0 | OUTPATIENT
Start: 2021-01-19

## 2020-11-18 NOTE — TELEPHONE ENCOUNTER
Dr. Corona-- the scripts seem to be going through now, not sure if you would like to try re-sending electronically or local print and fax.     amphetamine-dextroamphetamine (ADDERALL XR) 20 MG 24 hr capsule    E-Prescribing Status: Transmission to pharmacy failed (11/18/2020  1:29 PM CST)

## 2020-11-24 ENCOUNTER — TELEPHONE (OUTPATIENT)
Dept: FAMILY MEDICINE | Facility: CLINIC | Age: 50
End: 2020-11-24

## 2020-11-24 DIAGNOSIS — F90.0 ATTENTION DEFICIT HYPERACTIVITY DISORDER (ADHD), PREDOMINANTLY INATTENTIVE TYPE: Primary | ICD-10-CM

## 2020-11-24 RX ORDER — DEXTROAMPHETAMINE SACCHARATE, AMPHETAMINE ASPARTATE, DEXTROAMPHETAMINE SULFATE AND AMPHETAMINE SULFATE 5; 5; 5; 5 MG/1; MG/1; MG/1; MG/1
20 TABLET ORAL 2 TIMES DAILY
Qty: 60 TABLET | Refills: 0 | Status: SHIPPED | OUTPATIENT
Start: 2020-11-24 | End: 2020-12-24

## 2020-11-24 RX ORDER — DEXTROAMPHETAMINE SACCHARATE, AMPHETAMINE ASPARTATE, DEXTROAMPHETAMINE SULFATE AND AMPHETAMINE SULFATE 5; 5; 5; 5 MG/1; MG/1; MG/1; MG/1
20 TABLET ORAL 2 TIMES DAILY
Qty: 60 TABLET | Refills: 0 | Status: SHIPPED | OUTPATIENT
Start: 2020-12-25 | End: 2021-01-24

## 2020-11-24 RX ORDER — DEXTROAMPHETAMINE SACCHARATE, AMPHETAMINE ASPARTATE, DEXTROAMPHETAMINE SULFATE AND AMPHETAMINE SULFATE 5; 5; 5; 5 MG/1; MG/1; MG/1; MG/1
20 TABLET ORAL 2 TIMES DAILY
Qty: 60 TABLET | Refills: 0 | Status: SHIPPED | OUTPATIENT
Start: 2021-01-25 | End: 2021-02-05

## 2020-11-24 NOTE — TELEPHONE ENCOUNTER
Called pharmacist. They state that pt picked up Adderall ER 20 already on 11-19 for 30 tabs. They will cancel the other two scripts on file for amphetamine-dextroamphetamine (ADDERALL XR) 20 MG 24 hr capsule.

## 2020-11-24 NOTE — TELEPHONE ENCOUNTER
Please see message from patient-- states that she can't do XR Adderall which was sent 11/18. Previously Adderall 20 mg has been sent. Please advise.

## 2020-11-24 NOTE — TELEPHONE ENCOUNTER
Reason for Call:  Other call back    Detailed comments: patient is calling in regards to RX: amphetamine-dextroamphetamine (ADDERALL refill, states sent wrong medication for time release and patient cannot do time release. Please call to discuss. Thank you.  Please use Pharmacy: [facility] #[id number] [place] [id number] [facility] AT [facility] [place] [address] [facility]  [phone]    Phone Number Patient can be reached at: Home number on file [phone] (home)    Best Time:     Can we leave a detailed message on this number? YES    Call taken on 11/24/2020 at 9:07 AM by Ambar Germain

## 2020-12-11 DIAGNOSIS — H57.9 ITCHY EYES: ICD-10-CM

## 2020-12-11 DIAGNOSIS — G47.00 INSOMNIA, UNSPECIFIED TYPE: ICD-10-CM

## 2020-12-13 RX ORDER — TRAZODONE HYDROCHLORIDE 50 MG/1
TABLET, FILM COATED ORAL
Qty: 30 TABLET | Refills: 0 | Status: SHIPPED | OUTPATIENT
Start: 2020-12-13 | End: 2021-02-03

## 2020-12-23 ENCOUNTER — TELEPHONE (OUTPATIENT)
Dept: FAMILY MEDICINE | Facility: CLINIC | Age: 50
End: 2020-12-23

## 2020-12-23 DIAGNOSIS — F90.0 ATTENTION DEFICIT HYPERACTIVITY DISORDER (ADHD), PREDOMINANTLY INATTENTIVE TYPE: ICD-10-CM

## 2020-12-23 RX ORDER — DEXTROAMPHETAMINE SACCHARATE, AMPHETAMINE ASPARTATE, DEXTROAMPHETAMINE SULFATE AND AMPHETAMINE SULFATE 5; 5; 5; 5 MG/1; MG/1; MG/1; MG/1
20 TABLET ORAL 2 TIMES DAILY
Qty: 60 TABLET | Refills: 0 | OUTPATIENT
Start: 2020-12-23

## 2020-12-23 NOTE — TELEPHONE ENCOUNTER
Controlled Substance Refill Request for amphetamine-dextroamphetamine (ADDERALL) 20 MG tablet  Problem List Complete:  Yes  Priority:  Medium   Overview Addendum 7/20/2020  2:00 PM by Ashley Rodriguez RN   Patient is followed by Data Unavailable for ongoing controlled substance prescription. Med: Adderall IR.   Maximum use per month: 60  Expected duration: 30 days  Controlled substance agreement on file: YES  Clinic visit recommended: Q 3 months   checked: 7/20/20   Previous Version        checked in past 3 months?  Yes 7/20/2020     Routing to PCP.  Yoselyn Orantes MA

## 2020-12-23 NOTE — TELEPHONE ENCOUNTER
Reason for Call:  Other call back    Detailed comments: Patient had called stating she missed a call from the clinic, but there was no VM.  Writer unable to see that anyone tried reaching patient.    Phone Number Patient can be reached at: Home number on file [phone] (home)    Best Time: Anytime    Can we leave a detailed message on this number? YES    Call taken on 12/23/2020 at 1:18 PM by Iliana Temple

## 2020-12-23 NOTE — TELEPHONE ENCOUNTER
Pending Prescriptions:                       Disp   Refills    amphetamine-dextroamphetamine (ADDERALL) *60 tab*0            Sig: Take 1 tablet (20 mg) by mouth 2 times daily

## 2020-12-23 NOTE — TELEPHONE ENCOUNTER
Called and spoke with patient she said she had a miss call and thought she had a miss called from us, however there is no message stating anyone call the pt.     Informed her that we sent on her refill for Adderall.    Yoselyn Orantes MA

## 2020-12-24 NOTE — TELEPHONE ENCOUNTER
Called and spoke with pt, informed her that the medication is at the pharmacy with refills.    Yoselyn Orantes MA

## 2021-02-03 ENCOUNTER — OFFICE VISIT (OUTPATIENT)
Dept: FAMILY MEDICINE | Facility: CLINIC | Age: 51
End: 2021-02-03
Payer: COMMERCIAL

## 2021-02-03 ENCOUNTER — ANCILLARY PROCEDURE (OUTPATIENT)
Dept: MAMMOGRAPHY | Facility: CLINIC | Age: 51
End: 2021-02-03
Payer: COMMERCIAL

## 2021-02-03 VITALS
TEMPERATURE: 97.7 F | WEIGHT: 174 LBS | OXYGEN SATURATION: 99 % | BODY MASS INDEX: 25.05 KG/M2 | SYSTOLIC BLOOD PRESSURE: 110 MMHG | DIASTOLIC BLOOD PRESSURE: 64 MMHG | HEART RATE: 72 BPM

## 2021-02-03 DIAGNOSIS — B00.1 RECURRENT COLD SORES: ICD-10-CM

## 2021-02-03 DIAGNOSIS — J44.9 CHRONIC OBSTRUCTIVE PULMONARY DISEASE, UNSPECIFIED COPD TYPE (H): ICD-10-CM

## 2021-02-03 DIAGNOSIS — R06.2 WHEEZING: ICD-10-CM

## 2021-02-03 DIAGNOSIS — F90.0 ATTENTION DEFICIT HYPERACTIVITY DISORDER (ADHD), PREDOMINANTLY INATTENTIVE TYPE: ICD-10-CM

## 2021-02-03 DIAGNOSIS — Z72.0 TOBACCO USE: ICD-10-CM

## 2021-02-03 DIAGNOSIS — M79.7 FIBROMYALGIA MUSCLE PAIN: ICD-10-CM

## 2021-02-03 DIAGNOSIS — H57.9 ITCHY EYES: ICD-10-CM

## 2021-02-03 DIAGNOSIS — B00.1 HERPES LABIALIS: ICD-10-CM

## 2021-02-03 DIAGNOSIS — G47.00 INSOMNIA, UNSPECIFIED TYPE: ICD-10-CM

## 2021-02-03 DIAGNOSIS — M79.7 FIBROMYALGIA: ICD-10-CM

## 2021-02-03 DIAGNOSIS — B96.89 BV (BACTERIAL VAGINOSIS): ICD-10-CM

## 2021-02-03 DIAGNOSIS — R35.0 URINARY FREQUENCY: ICD-10-CM

## 2021-02-03 DIAGNOSIS — F32.4 MAJOR DEPRESSIVE DISORDER IN PARTIAL REMISSION, UNSPECIFIED WHETHER RECURRENT (H): ICD-10-CM

## 2021-02-03 DIAGNOSIS — N76.0 BV (BACTERIAL VAGINOSIS): ICD-10-CM

## 2021-02-03 DIAGNOSIS — Z00.00 ROUTINE HISTORY AND PHYSICAL EXAMINATION OF ADULT: Primary | ICD-10-CM

## 2021-02-03 DIAGNOSIS — Z12.31 VISIT FOR SCREENING MAMMOGRAM: ICD-10-CM

## 2021-02-03 LAB
ALBUMIN UR-MCNC: NEGATIVE MG/DL
ANION GAP SERPL CALCULATED.3IONS-SCNC: 3 MMOL/L (ref 3–14)
APPEARANCE UR: CLEAR
BILIRUB UR QL STRIP: NEGATIVE
BUN SERPL-MCNC: 16 MG/DL (ref 7–30)
CALCIUM SERPL-MCNC: 9.6 MG/DL (ref 8.5–10.1)
CHLORIDE SERPL-SCNC: 106 MMOL/L (ref 94–109)
CHOLEST SERPL-MCNC: 182 MG/DL
CO2 SERPL-SCNC: 30 MMOL/L (ref 20–32)
COLOR UR AUTO: YELLOW
CREAT SERPL-MCNC: 0.74 MG/DL (ref 0.52–1.04)
GFR SERPL CREATININE-BSD FRML MDRD: >90 ML/MIN/{1.73_M2}
GLUCOSE SERPL-MCNC: 84 MG/DL (ref 70–99)
GLUCOSE UR STRIP-MCNC: NEGATIVE MG/DL
HDLC SERPL-MCNC: 64 MG/DL
HGB UR QL STRIP: ABNORMAL
KETONES UR STRIP-MCNC: NEGATIVE MG/DL
LDLC SERPL CALC-MCNC: 106 MG/DL
LEUKOCYTE ESTERASE UR QL STRIP: NEGATIVE
NITRATE UR QL: NEGATIVE
NON-SQ EPI CELLS #/AREA URNS LPF: NORMAL /LPF
NONHDLC SERPL-MCNC: 118 MG/DL
PH UR STRIP: 6 PH (ref 5–7)
POTASSIUM SERPL-SCNC: 3.7 MMOL/L (ref 3.4–5.3)
RBC #/AREA URNS AUTO: NORMAL /HPF
SODIUM SERPL-SCNC: 139 MMOL/L (ref 133–144)
SOURCE: ABNORMAL
SP GR UR STRIP: 1.01 (ref 1–1.03)
SPECIMEN SOURCE: ABNORMAL
TRIGL SERPL-MCNC: 62 MG/DL
UROBILINOGEN UR STRIP-ACNC: 0.2 EU/DL (ref 0.2–1)
WBC #/AREA URNS AUTO: NORMAL /HPF
WET PREP SPEC: ABNORMAL

## 2021-02-03 PROCEDURE — 77067 SCR MAMMO BI INCL CAD: CPT | Mod: TC | Performed by: RADIOLOGY

## 2021-02-03 PROCEDURE — 87491 CHLMYD TRACH DNA AMP PROBE: CPT | Performed by: FAMILY MEDICINE

## 2021-02-03 PROCEDURE — 81001 URINALYSIS AUTO W/SCOPE: CPT | Performed by: FAMILY MEDICINE

## 2021-02-03 PROCEDURE — 99213 OFFICE O/P EST LOW 20 MIN: CPT | Mod: 25 | Performed by: FAMILY MEDICINE

## 2021-02-03 PROCEDURE — 87591 N.GONORRHOEAE DNA AMP PROB: CPT | Performed by: FAMILY MEDICINE

## 2021-02-03 PROCEDURE — 36415 COLL VENOUS BLD VENIPUNCTURE: CPT | Performed by: FAMILY MEDICINE

## 2021-02-03 PROCEDURE — 99396 PREV VISIT EST AGE 40-64: CPT | Performed by: FAMILY MEDICINE

## 2021-02-03 PROCEDURE — 80048 BASIC METABOLIC PNL TOTAL CA: CPT | Performed by: FAMILY MEDICINE

## 2021-02-03 PROCEDURE — 87210 SMEAR WET MOUNT SALINE/INK: CPT | Performed by: FAMILY MEDICINE

## 2021-02-03 PROCEDURE — 80061 LIPID PANEL: CPT | Performed by: FAMILY MEDICINE

## 2021-02-03 RX ORDER — IBUPROFEN 800 MG/1
TABLET, FILM COATED ORAL
Qty: 90 TABLET | Refills: 0 | Status: SHIPPED | OUTPATIENT
Start: 2021-02-03 | End: 2021-05-26

## 2021-02-03 RX ORDER — BUPROPION HYDROCHLORIDE 150 MG/1
150 TABLET ORAL EVERY MORNING
Qty: 90 TABLET | Refills: 0 | Status: SHIPPED | OUTPATIENT
Start: 2021-02-03 | End: 2021-02-28

## 2021-02-03 RX ORDER — NICOTINE 21 MG/24HR
1 PATCH, TRANSDERMAL 24 HOURS TRANSDERMAL EVERY 24 HOURS
Qty: 30 PATCH | Refills: 2 | Status: SHIPPED | OUTPATIENT
Start: 2021-02-03 | End: 2021-10-08 | Stop reason: DRUGHIGH

## 2021-02-03 RX ORDER — VALACYCLOVIR HYDROCHLORIDE 1 G/1
TABLET, FILM COATED ORAL
Qty: 20 TABLET | Refills: 2 | Status: CANCELLED | OUTPATIENT
Start: 2021-02-03

## 2021-02-03 RX ORDER — TRAZODONE HYDROCHLORIDE 50 MG/1
50 TABLET, FILM COATED ORAL AT BEDTIME
Qty: 90 TABLET | Refills: 0 | Status: SHIPPED | OUTPATIENT
Start: 2021-02-03 | End: 2021-04-16

## 2021-02-03 RX ORDER — NICOTINE 21 MG/24HR
1 PATCH, TRANSDERMAL 24 HOURS TRANSDERMAL EVERY 24 HOURS
Qty: 30 PATCH | Refills: 1 | Status: CANCELLED | OUTPATIENT
Start: 2021-02-03

## 2021-02-03 RX ORDER — DEXTROAMPHETAMINE SACCHARATE, AMPHETAMINE ASPARTATE, DEXTROAMPHETAMINE SULFATE AND AMPHETAMINE SULFATE 5; 5; 5; 5 MG/1; MG/1; MG/1; MG/1
20 TABLET ORAL 2 TIMES DAILY
Qty: 60 TABLET | Refills: 0 | Status: CANCELLED | OUTPATIENT
Start: 2021-02-03

## 2021-02-03 RX ORDER — ALBUTEROL SULFATE 90 UG/1
2 AEROSOL, METERED RESPIRATORY (INHALATION) EVERY 6 HOURS
Qty: 1 INHALER | Refills: 1 | Status: SHIPPED | OUTPATIENT
Start: 2021-02-03 | End: 2021-10-08

## 2021-02-03 RX ORDER — FEXOFENADINE HCL 180 MG/1
180 TABLET ORAL DAILY
Qty: 30 TABLET | Refills: 2 | Status: SHIPPED | OUTPATIENT
Start: 2021-02-03 | End: 2022-03-22

## 2021-02-03 RX ORDER — GABAPENTIN 300 MG/1
CAPSULE ORAL
Qty: 90 CAPSULE | Refills: 2 | Status: CANCELLED | OUTPATIENT
Start: 2021-02-03

## 2021-02-03 RX ORDER — CYCLOBENZAPRINE HCL 10 MG
TABLET ORAL
Qty: 30 TABLET | Refills: 2 | Status: SHIPPED | OUTPATIENT
Start: 2021-02-03 | End: 2021-06-23

## 2021-02-03 RX ORDER — ACYCLOVIR 50 MG/G
CREAM TOPICAL
Qty: 5 G | Refills: 3 | Status: SHIPPED | OUTPATIENT
Start: 2021-02-03 | End: 2021-02-16

## 2021-02-03 ASSESSMENT — ENCOUNTER SYMPTOMS
CHILLS: 0
ABDOMINAL PAIN: 1
HEMATURIA: 0
COUGH: 0
CONSTIPATION: 0
NERVOUS/ANXIOUS: 0
DIARRHEA: 0
EYE PAIN: 0
HEMATOCHEZIA: 0
FEVER: 0
DIZZINESS: 0
FREQUENCY: 1

## 2021-02-03 NOTE — PROGRESS NOTES
"   SUBJECTIVE:   CC: Marlin Lopez is an 51 year old woman who presents for preventive health visit.     Patient has been advised of split billing requirements and indicates understanding: Yes  Healthy Habits:     Getting at least 3 servings of Calcium per day:  NO    Bi-annual eye exam:  Yes    Dental care twice a year:  Yes    Sleep apnea or symptoms of sleep apnea:  None    Diet:  Low salt    Frequency of exercise:  2-3 days/week    Duration of exercise:  30-45 minutes    Taking medications regularly:  Yes    Medication side effects:  None    PHQ-2 Total Score: 0    Additional concerns today:  No    Concerns:  1. Mood changes/Depression:     Seems to be \"snappy\"/ragy with some people. Under a lot of stress; not back to work, told she owes unemployment, moved in and out of boyfriends.  Been through anger management.    2. Wheezing    Uses albuterol as needed    3. ADHD:     Taking Adderall 20 mg BID and helping with symptoms; will need refill in a few weeks.    4. Fibromyalgia:      Been on going. Taking Gabapentin, ibuprofen as needed.    5. Abdominal Pain     Has had UTIs and BV in past and would like this checked.    6. COPD/Tobacco use      Working on quitting smoking. Been on Nicotine and would like a lower dose.    7. Recurrent herpes:     On episodic antiviral, refill    Today's PHQ-2 Score:   PHQ-2 (  [employer]) 2/3/2021   Q1: Little interest or pleasure in doing things 0   Q2: Feeling down, depressed or hopeless 0   PHQ-2 Score 0   Q1: Little interest or pleasure in doing things Not at all   Q2: Feeling down, depressed or hopeless Not at all   PHQ-2 Score 0       Abuse: Current or Past (Physical, Sexual or Emotional) - NO  Do you feel safe in your environment? Yes        Social History     Tobacco Use     Smoking status: Former Smoker     Packs/day: 0.50     Types: Cigarettes     Start date: 1980     Quit date: 2015     Years since quittin.3     Smokeless tobacco: Never Used     Tobacco " comment:  using e-cig daily   Substance Use Topics     Alcohol use: Yes     Alcohol/week: 0.0 standard drinks     Comment:  drinks 3 days in a week, 2 beers each time      If you drink alcohol do you typically have >3 drinks per day or >7 drinks per week? No    Alcohol Use 2/3/2021   Prescreen: >3 drinks/day or >7 drinks/week? No   Prescreen: >3 drinks/day or >7 drinks/week? -   No flowsheet data found.    Reviewed orders with patient.  Reviewed health maintenance and updated orders accordingly - Yes  Patient Active Problem List   Diagnosis     Anxiety state     Fibromyalgia     Sleep problems     Recurrent cold sores     Moderate major depression (H)     ADHD (attention deficit hyperactivity disorder)     CARDIOVASCULAR SCREENING; LDL GOAL LESS THAN 130     Family history of colon cancer     Allergic rhinitis     Renal cyst, left     Chronic constipation     Diverticulosis of large intestine without hemorrhage     Ulceration of colon determined by endoscopy     Past Surgical History:   Procedure Laterality Date     C REMOVAL OF OVARY(S)      Left, cyst     TONSILLECTOMY         Social History     Tobacco Use     Smoking status: Former Smoker     Packs/day: 0.50     Types: Cigarettes     Start date: 1980     Quit date: 2015     Years since quittin.3     Smokeless tobacco: Never Used     Tobacco comment:  using e-cig daily   Substance Use Topics     Alcohol use: Yes     Alcohol/week: 0.0 standard drinks     Comment:  drinks 3 days in a week, 2 beers each time      Family History   Problem Relation Age of Onset     Hypertension Maternal Grandmother      Cerebrovascular Disease Maternal Grandmother      Alzheimer Disease Maternal Grandmother      Arthritis Maternal Grandmother      Obesity Maternal Grandmother      Cancer Other         Colon     Breast Cancer Other      Cancer - colorectal Paternal Grandfather            Breast CA Risk Screening:  Breast CA Risk Assessment (FHS-7) 2/3/2021   Do you  have a family history of breast, colon, or ovarian cancer? Yes   Did any of your first-degree relatives have breast or ovarian cancer? No   Did any of your relatives have bilateral breast cancer? No   Did any man in your family have breast cancer? No   Did any woman in your family have breast and ovarian cancer? No   Did any woman in your family have breast cancer before age 50 y? Yes   Do you have 2 or more relatives with breast and/or ovarian cancer? No   Do you have 2 or more relatives with breast and/or bowel cancer? Yes     Breast CA Risk Assessment (FHS-7) 2/3/2021   Do you have a family history of breast, colon, or ovarian cancer? Yes   Did any of your first-degree relatives have breast or ovarian cancer? No   Did any of your relatives have bilateral breast cancer? No   Did any man in your family have breast cancer? No   Did any woman in your family have breast and ovarian cancer? No   Did any woman in your family have breast cancer before age 50 y? Yes   Do you have 2 or more relatives with breast and/or ovarian cancer? No   Do you have 2 or more relatives with breast and/or bowel cancer? Yes     click delete button to remove this line now  Mammogram Screening: Recommended annual mammography  Pertinent mammograms are reviewed under the imaging tab.    History of abnormal Pap smear: NO - age 30-65 PAP every 5 years with negative HPV co-testing recommended  PAP / HPV Latest Ref Rng & Units 4/6/2018 8/4/2015 2/22/2012   PAP - NIL NIL NIL   HPV 16 DNA NEG:Negative Negative - -   HPV 18 DNA NEG:Negative Negative - -   OTHER HR HPV NEG:Negative Negative - -     Reviewed and updated as needed this visit by clinical staff  Tobacco  Allergies  Meds            Reviewed and updated as needed this visit by Provider                Review of Systems   Constitutional: Negative for chills and fever.   HENT: Negative for congestion and ear pain.    Eyes: Negative for pain.   Respiratory: Negative for cough.     Cardiovascular: Negative for chest pain.   Gastrointestinal: Positive for abdominal pain. Negative for constipation, diarrhea and hematochezia.   Genitourinary: Positive for frequency. Negative for genital sores and hematuria.   Neurological: Negative for dizziness.   Psychiatric/Behavioral: The patient is not nervous/anxious.       OBJECTIVE:   /64   Pulse 72   Temp 97.7  F (36.5  C) (Oral)   Wt 78.9 kg (174 lb)   LMP 01/03/2014   SpO2 99%   BMI 25.05 kg/m    Physical Exam  GENERAL: healthy, alert and no distress  EYES: Eyes grossly normal to inspection, PERRL and conjunctivae and sclerae normal  HENT: ear canals and TM's normal, nose and mouth without ulcers or lesions  NECK: no adenopathy, no asymmetry, masses, or scars and thyroid normal to palpation  RESP: lungs clear to auscultation - no rales, rhonchi or wheezes  BREAST: normal without masses, tenderness or nipple discharge and no palpable axillary masses or adenopathy  CV: regular rate and rhythm, normal S1 S2, no S3 or S4, no murmur, click or rub, no peripheral edema and peripheral pulses strong  ABDOMEN: soft, nontender, no hepatosplenomegaly, no masses and bowel sounds normal  MS: no gross musculoskeletal defects noted, no edema  SKIN: no suspicious lesions or rashes  NEURO: Normal strength and tone, mentation intact and speech normal  PSYCH: mentation appears normal, affect normal/bright  Results for orders placed or performed in visit on 02/03/21   UA reflex to Microscopic     Status: Abnormal   Result Value Ref Range    Color Urine Yellow     Appearance Urine Clear     Glucose Urine Negative NEG^Negative mg/dL    Bilirubin Urine Negative NEG^Negative    Ketones Urine Negative NEG^Negative mg/dL    Specific Gravity Urine 1.010 1.003 - 1.035    Blood Urine Trace (A) NEG^Negative    pH Urine 6.0 5.0 - 7.0 pH    Protein Albumin Urine Negative NEG^Negative mg/dL    Urobilinogen Urine 0.2 0.2 - 1.0 EU/dL    Nitrite Urine Negative NEG^Negative     Leukocyte Esterase Urine Negative NEG^Negative    Source Midstream Urine    Lipid Profile (Chol, Trig, HDL, LDL calc)     Status: Abnormal   Result Value Ref Range    Cholesterol 182 <200 mg/dL    Triglycerides 62 <150 mg/dL    HDL Cholesterol 64 >49 mg/dL    LDL Cholesterol Calculated 106 (H) <100 mg/dL    Non HDL Cholesterol 118 <130 mg/dL   Basic metabolic panel     Status: None   Result Value Ref Range    Sodium 139 133 - 144 mmol/L    Potassium 3.7 3.4 - 5.3 mmol/L    Chloride 106 94 - 109 mmol/L    Carbon Dioxide 30 20 - 32 mmol/L    Anion Gap 3 3 - 14 mmol/L    Glucose 84 70 - 99 mg/dL    Urea Nitrogen 16 7 - 30 mg/dL    Creatinine 0.74 0.52 - 1.04 mg/dL    GFR Estimate >90 >60 mL/min/[1.73_m2]    GFR Estimate If Black >90 >60 mL/min/[1.73_m2]    Calcium 9.6 8.5 - 10.1 mg/dL   Urine Microscopic     Status: None   Result Value Ref Range    WBC Urine 0 - 5 OTO5^0 - 5 /HPF    RBC Urine O - 2 OTO2^O - 2 /HPF    Squamous Epithelial /LPF Urine Few FEW^Few /LPF   Wet prep     Status: Abnormal    Specimen: Vagina   Result Value Ref Range    Specimen Description Vagina     Wet Prep No Trichomonas seen     Wet Prep No yeast seen     Wet Prep Clue cells seen  Many   (A)     Wet Prep WBC'S seen  Few      NEISSERIA GONORRHOEA PCR     Status: None    Specimen: Genital, vaginal   Result Value Ref Range    Specimen Descrip Urine     N Gonorrhea PCR Negative NEG^Negative   CHLAMYDIA TRACHOMATIS PCR     Status: None    Specimen: Genital, vaginal   Result Value Ref Range    Specimen Description Urine     Chlamydia Trachomatis PCR Negative NEG^Negative   Results for orders placed or performed in visit on 02/03/21   MA Screening Digital Bilateral     Status: None    Narrative    SCREENING MAMMOGRAM, BILATERAL, DIGITAL w/CAD - 2/3/2021 11:03 AM.    BREAST SYMPTOMS: No current breast complaints.     COMPARISON:  5/23/2017, 7/15/2013.    BREAST DENSITY: Scattered fibroglandular densities.    COMMENTS: No findings of suspicion for  malignancy.       Impression    IMPRESSION: BI-RADS CATEGORY: 1 - Negative.    RECOMMENDED FOLLOW-UP: Annual Mammography.  Recommend routine annual screening mammography.    Exam results letter mailed to patient.                JLUIS GRIFFIN MD         ASSESSMENT/PLAN:   Marlin was seen today for physical, anxiety and dysuria.    Diagnoses and all orders for this visit:    Routine history and physical examination of adult  -     Wet prep  -     NEISSERIA GONORRHOEA PCR  -     CHLAMYDIA TRACHOMATIS PCR  -     Lipid Profile (Chol, Trig, HDL, LDL calc)  -     Basic metabolic panel  -     Urine Microscopic    Recurrent cold sores  -     acyclovir (ZOVIRAX) 5 % external cream; Apply topically 5 times daily    Wheezing  -     albuterol (PROAIR HFA/PROVENTIL HFA/VENTOLIN HFA) 108 (90 Base) MCG/ACT inhaler; Inhale 2 puffs into the lungs every 6 hours    Attention deficit hyperactivity disorder (ADHD), predominantly inattentive type  -     amphetamine-dextroamphetamine (ADDERALL) 20 MG tablet; Take 1 tablet (20 mg) by mouth 2 times daily    Fibromyalgia  -     cyclobenzaprine (FLEXERIL) 10 MG tablet; TAKE 1 TABLET(10 MG) BY MOUTH AT BEDTIME  -     ibuprofen (ADVIL/MOTRIN) 800 MG tablet; TAKE 1 TABLET(800 MG) BY MOUTH EVERY 8 HOURS AS NEEDED FOR MODERATE PAIN    Itchy eyes  -     ketotifen (EYE ITCH RELIEF) 0.025 % ophthalmic solution; INSTILL 1 DROP IN BOTH EYES TWICE DAILY  -     fexofenadine (ALLEGRA) 180 MG tablet; Take 1 tablet (180 mg) by mouth daily    Fibromyalgia muscle pain     -  On Gabapentin;    Tobacco use  -     nicotine (NICODERM CQ) 14 MG/24HR 24 hr patch; Place 1 patch onto the skin every 24 hours    Insomnia, unspecified type  -     traZODone (DESYREL) 50 MG tablet; Take 1 tablet (50 mg) by mouth At Bedtime    Herpes labialis     -    Valtrex.    Major depressive disorder in partial remission, unspecified whether recurrent (H)  -     buPROPion (WELLBUTRIN XL) 150 MG 24 hr tablet; Take 1 tablet (150 mg) by  "mouth every morning    Urinary frequency  -     UA reflex to Microscopic    Chronic obstructive pulmonary disease, unspecified COPD type (H)       -   Stable; encouraged quitting smoking.       -  Albuterol as needed.    BV (bacterial vaginosis)  -     metroNIDAZOLE (FLAGYL) 500 MG tablet; Take 1 tablet (500 mg) by mouth 2 times daily for 7 days    Patient has been advised of split billing requirements and indicates understanding: Yes  COUNSELING:  Reviewed preventive health counseling, as reflected in patient instructions       Regular exercise       Healthy diet/nutrition    Estimated body mass index is 25.05 kg/m  as calculated from the following:    Height as of 9/13/19: 1.775 m (5' 9.88\").    Weight as of this encounter: 78.9 kg (174 lb).    Weight management plan: Discussed healthy diet and exercise guidelines    She reports that she quit smoking about 5 years ago. Her smoking use included cigarettes. She started smoking about 41 years ago. She smoked 0.50 packs per day. She has never used smokeless tobacco.      Counseling Resources:  ATP IV Guidelines  Pooled Cohorts Equation Calculator  Breast Cancer Risk Calculator  BRCA-Related Cancer Risk Assessment: FHS-7 Tool  FRAX Risk Assessment  ICSI Preventive Guidelines  Dietary Guidelines for Americans, 2010  [employer]'s MyPlate  ASA Prophylaxis  Lung CA Screening    Zach Corona MD  [facility]  "

## 2021-02-04 ENCOUNTER — TELEPHONE (OUTPATIENT)
Dept: FAMILY MEDICINE | Facility: CLINIC | Age: 51
End: 2021-02-04

## 2021-02-04 DIAGNOSIS — B00.1 HERPES LABIALIS: ICD-10-CM

## 2021-02-05 PROBLEM — J44.9 COPD (CHRONIC OBSTRUCTIVE PULMONARY DISEASE) (H): Status: ACTIVE | Noted: 2021-02-05

## 2021-02-05 RX ORDER — METRONIDAZOLE 500 MG/1
500 TABLET ORAL 2 TIMES DAILY
Qty: 14 TABLET | Refills: 1 | Status: SHIPPED | OUTPATIENT
Start: 2021-02-05 | End: 2021-02-16

## 2021-02-05 RX ORDER — DEXTROAMPHETAMINE SACCHARATE, AMPHETAMINE ASPARTATE, DEXTROAMPHETAMINE SULFATE AND AMPHETAMINE SULFATE 5; 5; 5; 5 MG/1; MG/1; MG/1; MG/1
20 TABLET ORAL 2 TIMES DAILY
Qty: 60 TABLET | Refills: 0 | Status: SHIPPED | OUTPATIENT
Start: 2021-02-23 | End: 2021-04-14

## 2021-02-05 NOTE — TELEPHONE ENCOUNTER
Central Prior Authorization Team   Phone: [phone]    PA Initiation    Medication: acyclovir cream  Insurance Company: [employer] [place] - Phone [phone] Fax [phone]  Pharmacy Filling the Rx: [id number] - [place], [place] - [address] [place] [address]   Filling Pharmacy Phone: [phone]  Filling Pharmacy Fax: [phone]  Start Date: 2/5/2021

## 2021-02-08 NOTE — TELEPHONE ENCOUNTER
PRIOR AUTHORIZATION DENIED    Medication: acyclovir cream-DENIED    Denial Date: 2/5/2021    Denial Rational: PATIENT MUST TRY/FAIL FORMULARY ALTERNATIVE - DENAVIR (CREAM).        Appeal Information:  IF PATIENT IS UNABLE TO TRY/FAIL ALTERNATIVE(S) PLEASE SUPPLY PA TEAM WITH A LETTER OF MEDICAL NECESSITY WITH CLINICAL REASON.

## 2021-02-16 RX ORDER — VALACYCLOVIR HYDROCHLORIDE 1 G/1
TABLET, FILM COATED ORAL
Qty: 20 TABLET | Refills: 2 | Status: SHIPPED | OUTPATIENT
Start: 2021-02-16 | End: 2021-11-19

## 2021-02-25 DIAGNOSIS — F32.4 MAJOR DEPRESSIVE DISORDER IN PARTIAL REMISSION, UNSPECIFIED WHETHER RECURRENT (H): ICD-10-CM

## 2021-02-28 RX ORDER — BUPROPION HYDROCHLORIDE 150 MG/1
150 TABLET ORAL EVERY MORNING
Qty: 90 TABLET | Refills: 0 | Status: SHIPPED | OUTPATIENT
Start: 2021-02-28

## 2021-04-14 DIAGNOSIS — G47.00 INSOMNIA, UNSPECIFIED TYPE: ICD-10-CM

## 2021-04-14 DIAGNOSIS — M79.7 FIBROMYALGIA MUSCLE PAIN: ICD-10-CM

## 2021-04-14 DIAGNOSIS — B96.89 BV (BACTERIAL VAGINOSIS): ICD-10-CM

## 2021-04-14 DIAGNOSIS — N76.0 BV (BACTERIAL VAGINOSIS): ICD-10-CM

## 2021-04-14 DIAGNOSIS — F17.200 TOBACCO USE DISORDER: ICD-10-CM

## 2021-04-14 DIAGNOSIS — F90.0 ATTENTION DEFICIT HYPERACTIVITY DISORDER (ADHD), PREDOMINANTLY INATTENTIVE TYPE: ICD-10-CM

## 2021-04-14 RX ORDER — DEXTROAMPHETAMINE SACCHARATE, AMPHETAMINE ASPARTATE, DEXTROAMPHETAMINE SULFATE AND AMPHETAMINE SULFATE 5; 5; 5; 5 MG/1; MG/1; MG/1; MG/1
20 TABLET ORAL 2 TIMES DAILY
Qty: 60 TABLET | Refills: 0 | Status: SHIPPED | OUTPATIENT
Start: 2021-04-14 | End: 2021-05-26

## 2021-04-14 NOTE — TELEPHONE ENCOUNTER
Spoke to patient and informed her follow up appointment is needed. Patient stated she was just in and only needs to come in once a year. Please advise.  Clementina COOLEY CMA ([facility])

## 2021-04-14 NOTE — TELEPHONE ENCOUNTER
Patient is requesting a refill on her medications Adderall 20MG- takes twice a day. [facility] is [facility] [place].  Kayla Castillo   [facility]  Central Scheduler

## 2021-04-14 NOTE — TELEPHONE ENCOUNTER
Controlled Substance Refill Request for amphetamine-dextroamphetamine (ADDERALL) 20 MG tablet  Problem List Complete:  Yes    ADHD (attention deficit hyperactivity disorder)  Problem Detail    Priority:  Medium   Overview Addendum 7/20/2020  2:00 PM by Ashley Rodriguez RN   Patient is followed by Data Unavailable for ongoing controlled substance prescription. Med: Adderall IR.   Maximum use per month: 60  Expected duration: 30 days  Controlled substance agreement on file: YES  Clinic visit recommended: Q 3 months   checked: 7/20/20        checked in past 3 months?  No, route to RN

## 2021-04-16 RX ORDER — METRONIDAZOLE 7.5 MG/G
GEL VAGINAL
Qty: 70 G | Refills: 5 | OUTPATIENT
Start: 2021-04-16

## 2021-04-16 RX ORDER — GABAPENTIN 300 MG/1
CAPSULE ORAL
Qty: 90 CAPSULE | Refills: 2 | OUTPATIENT
Start: 2021-04-16

## 2021-04-16 RX ORDER — TRAZODONE HYDROCHLORIDE 50 MG/1
TABLET, FILM COATED ORAL
Qty: 90 TABLET | Refills: 0 | Status: SHIPPED | OUTPATIENT
Start: 2021-04-16 | End: 2021-06-22

## 2021-04-16 RX ORDER — NICOTINE 21 MG/24HR
PATCH, TRANSDERMAL 24 HOURS TRANSDERMAL
Qty: 28 PATCH | Refills: 0 | Status: SHIPPED | OUTPATIENT
Start: 2021-04-16 | End: 2021-09-27

## 2021-04-16 NOTE — TELEPHONE ENCOUNTER
Routing refill request to providers' pool (due to PCP being out of office) for review/approval because:  Drug not on the [facility] refill protocol     And Metronidazole was discontinued.    Pending Prescriptions:                       Disp   Refills    gabapentin (NEURONTIN) 300 MG capsule [Pha*90 cap*2        Sig: TAKE 1 CAPSULE(300 MG) BY MOUTH THREE TIMES DAILY    metroNIDAZOLE (METROGEL) 0.75 % vaginal ge*70 g   5        Sig: INSERT 1 APPLICATORFUL VAGINALLY EVERY NIGHT AT           BEDTIME FOR 5 NIGHTS    Signed Prescriptions:                        Disp   Refills    traZODone (DESYREL) 50 MG tablet           90 tab*0        Sig: TAKE 1 TABLET(50 MG) BY MOUTH AT BEDTIME  Authorizing Provider: JENNY GANDHI  Ordering User: COLTON FLORIAN    nicotine (NICODERM CQ) 21 MG/24HR 24 hr pa*28 pat*0        Sig: APPLY 1 PATCH EXTERNALLY TO THE SKIN EVERY 24 HOURS  Authorizing Provider: JENNY GANDHI  Ordering User: COLTON FLORIAN RN

## 2021-05-26 ENCOUNTER — TELEPHONE (OUTPATIENT)
Dept: FAMILY MEDICINE | Facility: CLINIC | Age: 51
End: 2021-05-26

## 2021-05-26 DIAGNOSIS — M79.7 FIBROMYALGIA: ICD-10-CM

## 2021-05-26 DIAGNOSIS — F90.0 ATTENTION DEFICIT HYPERACTIVITY DISORDER (ADHD), PREDOMINANTLY INATTENTIVE TYPE: ICD-10-CM

## 2021-05-26 RX ORDER — IBUPROFEN 800 MG/1
TABLET, FILM COATED ORAL
Qty: 90 TABLET | Refills: 0 | Status: SHIPPED | OUTPATIENT
Start: 2021-05-26 | End: 2022-03-21

## 2021-05-26 RX ORDER — DEXTROAMPHETAMINE SACCHARATE, AMPHETAMINE ASPARTATE, DEXTROAMPHETAMINE SULFATE AND AMPHETAMINE SULFATE 5; 5; 5; 5 MG/1; MG/1; MG/1; MG/1
20 TABLET ORAL 2 TIMES DAILY
Qty: 60 TABLET | Refills: 0 | Status: SHIPPED | OUTPATIENT
Start: 2021-05-26 | End: 2021-06-23

## 2021-05-26 NOTE — TELEPHONE ENCOUNTER
Patient requesting refill of Adderall 20 mg. Last prescription was from 4/14/2. Routing to provider, not on refill protocol.    Aury Rueda RN

## 2021-06-22 ENCOUNTER — TELEPHONE (OUTPATIENT)
Dept: FAMILY MEDICINE | Facility: CLINIC | Age: 51
End: 2021-06-22

## 2021-06-22 DIAGNOSIS — M79.7 FIBROMYALGIA: ICD-10-CM

## 2021-06-22 DIAGNOSIS — G47.00 INSOMNIA, UNSPECIFIED TYPE: ICD-10-CM

## 2021-06-22 DIAGNOSIS — F90.0 ATTENTION DEFICIT HYPERACTIVITY DISORDER (ADHD), PREDOMINANTLY INATTENTIVE TYPE: ICD-10-CM

## 2021-06-22 NOTE — TELEPHONE ENCOUNTER
Reason for Call:  Medication or medication refill:    Do you use a [facility]?  Name of the pharmacy and phone number for the current request:  [facility] - [phone]    Name of the medication requested: Adderall, flexeril, desyrel    Other request: please refill and call to confirm when complete    Can we leave a detailed message on this number? YES    Phone number patient can be reached at: Home number on file [phone] (home)    Best Time: as soon as possible    Call taken on 6/22/2021 at 11:26 AM by Kathy Pope

## 2021-06-22 NOTE — TELEPHONE ENCOUNTER
Adderall, flexeril, desyrel  Controlled Substance Refill Request for   Problem List Complete:  Yes   checked in past 3 months?  No, route to RN

## 2021-06-23 RX ORDER — TRAZODONE HYDROCHLORIDE 50 MG/1
TABLET, FILM COATED ORAL
Qty: 90 TABLET | Refills: 0 | Status: SHIPPED | OUTPATIENT
Start: 2021-06-23 | End: 2021-07-26

## 2021-06-23 RX ORDER — DEXTROAMPHETAMINE SACCHARATE, AMPHETAMINE ASPARTATE, DEXTROAMPHETAMINE SULFATE AND AMPHETAMINE SULFATE 5; 5; 5; 5 MG/1; MG/1; MG/1; MG/1
20 TABLET ORAL 2 TIMES DAILY
Qty: 60 TABLET | Refills: 0 | Status: SHIPPED | OUTPATIENT
Start: 2021-06-26 | End: 2021-07-26

## 2021-06-23 RX ORDER — CYCLOBENZAPRINE HCL 10 MG
TABLET ORAL
Qty: 30 TABLET | Refills: 2 | Status: SHIPPED | OUTPATIENT
Start: 2021-06-23 | End: 2021-12-02

## 2021-06-23 NOTE — TELEPHONE ENCOUNTER
Called patient and left detailed VM informing her refills were sent.  Clementina COOLEY CMA ([facility])

## 2021-07-26 DIAGNOSIS — F90.0 ATTENTION DEFICIT HYPERACTIVITY DISORDER (ADHD), PREDOMINANTLY INATTENTIVE TYPE: ICD-10-CM

## 2021-07-26 DIAGNOSIS — G47.00 INSOMNIA, UNSPECIFIED TYPE: ICD-10-CM

## 2021-07-26 DIAGNOSIS — M54.50 CHRONIC BILATERAL LOW BACK PAIN WITHOUT SCIATICA: ICD-10-CM

## 2021-07-26 DIAGNOSIS — G89.29 CHRONIC BILATERAL LOW BACK PAIN WITHOUT SCIATICA: ICD-10-CM

## 2021-07-26 RX ORDER — TRAZODONE HYDROCHLORIDE 50 MG/1
TABLET, FILM COATED ORAL
Qty: 90 TABLET | Refills: 0 | Status: SHIPPED | OUTPATIENT
Start: 2021-07-26 | End: 2021-09-29

## 2021-07-26 RX ORDER — CYCLOBENZAPRINE HCL 10 MG
10 TABLET ORAL 3 TIMES DAILY
Qty: 30 TABLET | Refills: 0 | Status: SHIPPED | OUTPATIENT
Start: 2021-07-26 | End: 2021-09-29

## 2021-07-26 RX ORDER — DEXTROAMPHETAMINE SACCHARATE, AMPHETAMINE ASPARTATE, DEXTROAMPHETAMINE SULFATE AND AMPHETAMINE SULFATE 5; 5; 5; 5 MG/1; MG/1; MG/1; MG/1
20 TABLET ORAL 2 TIMES DAILY
Qty: 60 TABLET | Refills: 0 | Status: SHIPPED | OUTPATIENT
Start: 2021-07-26 | End: 2021-08-27

## 2021-07-26 NOTE — TELEPHONE ENCOUNTER
Controlled Substance Refill Request for amphetamine-dextroamphetamine (ADDERALL) 20 MG tablet  Problem List Complete:  Yes   checked in past 3 months?  No, route to RN

## 2021-07-26 NOTE — TELEPHONE ENCOUNTER
Reason for call:  Medication   If this is a refill request, has the caller requested the refill from the pharmacy already? No  Will the patient be using a [facility]? No  Name of the pharmacy and phone number for the current request:    [id number] - [place], [place] - [address] [place] [address] AT [facility] [address] [facility] [address]     Name of the medication requested: traZODone (DESYREL) 50 MG tablet, FLEXERIL 10 MG OR TABS, and amphetamine-dextroamphetamine (ADDERALL) 20 MG tablet    Other request: Flexeril rx is .    Phone number to reach patient:  Home number on file [phone] (home)    Best Time:  anytime    Can we leave a detailed message on this number?  YES    Travel screening: Not Applicable

## 2021-07-26 NOTE — TELEPHONE ENCOUNTER
Patient needs adderall refill. Ok to leave a detailed message. Needs refill on flexeril and trazodone.

## 2021-08-26 DIAGNOSIS — F90.0 ATTENTION DEFICIT HYPERACTIVITY DISORDER (ADHD), PREDOMINANTLY INATTENTIVE TYPE: ICD-10-CM

## 2021-08-26 NOTE — TELEPHONE ENCOUNTER
Patient needs her amphetamine-dextroamphetamine (ADDERALL) 20 MG tablet sent to [id number] - [place], [place] - [address] AT [facility] [address] .  Sharon Geronimo,

## 2021-08-27 RX ORDER — DEXTROAMPHETAMINE SACCHARATE, AMPHETAMINE ASPARTATE, DEXTROAMPHETAMINE SULFATE AND AMPHETAMINE SULFATE 5; 5; 5; 5 MG/1; MG/1; MG/1; MG/1
20 TABLET ORAL 2 TIMES DAILY
Qty: 60 TABLET | Refills: 0 | Status: SHIPPED | OUTPATIENT
Start: 2021-08-27 | End: 2021-09-29

## 2021-08-30 NOTE — TELEPHONE ENCOUNTER
Next 5 appointments (look out 90 days)    Oct 08, 2021 11:20 AM  PHYSICAL with Zach Corona MD  [facility] [address]  [place] [address]  [phone]       Vani Mathias,

## 2021-09-27 DIAGNOSIS — F90.0 ATTENTION DEFICIT HYPERACTIVITY DISORDER (ADHD), PREDOMINANTLY INATTENTIVE TYPE: ICD-10-CM

## 2021-09-27 DIAGNOSIS — F17.200 TOBACCO USE DISORDER: ICD-10-CM

## 2021-09-27 DIAGNOSIS — G89.29 CHRONIC BILATERAL LOW BACK PAIN WITHOUT SCIATICA: ICD-10-CM

## 2021-09-27 DIAGNOSIS — M54.50 CHRONIC BILATERAL LOW BACK PAIN WITHOUT SCIATICA: ICD-10-CM

## 2021-09-27 DIAGNOSIS — M79.7 FIBROMYALGIA MUSCLE PAIN: ICD-10-CM

## 2021-09-27 DIAGNOSIS — G47.00 INSOMNIA, UNSPECIFIED TYPE: ICD-10-CM

## 2021-09-27 NOTE — LETTER
September 29, 2021      Marlin Lopez  [address]  [place] [address]        Dear Marlin,     Your provider has sent a 30 day suresh refill of gabapentin (NEURONTIN) 300 MG capsule, cyclobenzaprine (FLEXERIL) 10 MG tablet, traZODone (DESYREL) 50 MG tablet, amphetamine-dextroamphetamine (ADDERALL) 20 MG tablet, and nicotine (NICODERM CQ) 21 MG/24HR 24 hr patch. You are due for an appointment for further refills. Please contact the clinic to schedule an appointment for further refills.        Sincerely,        Zach Corona MD

## 2021-09-29 RX ORDER — GABAPENTIN 300 MG/1
300 CAPSULE ORAL 3 TIMES DAILY
Qty: 90 CAPSULE | Refills: 0 | Status: SHIPPED | OUTPATIENT
Start: 2021-09-29

## 2021-09-29 RX ORDER — NICOTINE 21 MG/24HR
1 PATCH, TRANSDERMAL 24 HOURS TRANSDERMAL EVERY 24 HOURS
Qty: 28 PATCH | Refills: 0 | Status: SHIPPED | OUTPATIENT
Start: 2021-09-29 | End: 2021-10-08

## 2021-09-29 RX ORDER — TRAZODONE HYDROCHLORIDE 50 MG/1
TABLET, FILM COATED ORAL
Qty: 90 TABLET | Refills: 0 | Status: SHIPPED | OUTPATIENT
Start: 2021-09-29 | End: 2022-01-05

## 2021-09-29 RX ORDER — CYCLOBENZAPRINE HCL 10 MG
10 TABLET ORAL 3 TIMES DAILY
Qty: 30 TABLET | Refills: 0 | Status: SHIPPED | OUTPATIENT
Start: 2021-09-29 | End: 2022-01-05

## 2021-09-29 RX ORDER — DEXTROAMPHETAMINE SACCHARATE, AMPHETAMINE ASPARTATE, DEXTROAMPHETAMINE SULFATE AND AMPHETAMINE SULFATE 5; 5; 5; 5 MG/1; MG/1; MG/1; MG/1
20 TABLET ORAL 2 TIMES DAILY
Qty: 60 TABLET | Refills: 0 | Status: SHIPPED | OUTPATIENT
Start: 2021-09-29 | End: 2021-10-08

## 2021-09-29 NOTE — TELEPHONE ENCOUNTER
"Routing refill request to provider for review/approval because:  Flexeril, Gabapentin, Adderall not on the [facility] refill protocol       Requested Prescriptions   Pending Prescriptions Disp Refills     amphetamine-dextroamphetamine (ADDERALL) 20 MG tablet 60 tablet 0     Sig: Take 1 tablet (20 mg) by mouth 2 times daily       There is no refill protocol information for this order        cyclobenzaprine (FLEXERIL) 10 MG tablet 30 tablet 0     Sig: Take 1 tablet (10 mg) by mouth 3 times daily       There is no refill protocol information for this order        gabapentin (NEURONTIN) 300 MG capsule 90 capsule      Sig: Take 1 capsule (300 mg) by mouth 3 times daily       There is no refill protocol information for this order      Signed Prescriptions Disp Refills    nicotine (NICODERM CQ) 21 MG/24HR 24 hr patch 28 patch 0     Sig: Place 1 patch onto the skin every 24 hours       Partial Cholinergic Nicotinic Agonist Agents Passed - 9/27/2021  2:35 PM        Passed - Blood pressure under 140/90 in past 12 months     BP Readings from Last 3 Encounters:   02/03/21 110/64   12/27/19 108/66   09/13/19 116/78                 Passed - Recent (12 mo) or future (30 days) visit within the authorizing provider's specialty     Patient has had an office visit with the authorizing provider or a provider within the authorizing providers department within the previous 12 mos or has a future within next 30 days. See \"Patient Info\" tab in inbasket, or \"Choose Columns\" in Meds & Orders section of the refill encounter.              Passed - Medication is active on med list        Passed - Patient is 18 years of age or older        Passed - Patient is not pregnant        Passed - No positive pregnancy test on file in past 12 months          traZODone (DESYREL) 50 MG tablet 90 tablet 0     Sig: TAKE 1 TABLET(50 MG) BY MOUTH AT BEDTIME       Serotonin Modulators Passed - 9/27/2021  2:35 PM        Passed - Recent (12 mo) or future (30 days) visit " "within the authorizing provider's specialty     Patient has had an office visit with the authorizing provider or a provider within the authorizing providers department within the previous 12 mos or has a future within next 30 days. See \"Patient Info\" tab in inbasket, or \"Choose Columns\" in Meds & Orders section of the refill encounter.              Passed - Medication is active on med list        Passed - Patient is age 18 or older        Passed - No active pregnancy on record        Passed - No positive pregnancy test in past 12 months           Ashley Kamara RN    "

## 2021-10-08 ENCOUNTER — OFFICE VISIT (OUTPATIENT)
Dept: FAMILY MEDICINE | Facility: CLINIC | Age: 51
End: 2021-10-08
Payer: COMMERCIAL

## 2021-10-08 VITALS
HEART RATE: 81 BPM | DIASTOLIC BLOOD PRESSURE: 85 MMHG | BODY MASS INDEX: 24.76 KG/M2 | SYSTOLIC BLOOD PRESSURE: 121 MMHG | OXYGEN SATURATION: 96 % | RESPIRATION RATE: 19 BRPM | WEIGHT: 167.2 LBS | TEMPERATURE: 98 F | HEIGHT: 69 IN

## 2021-10-08 DIAGNOSIS — Z72.0 TOBACCO USE: ICD-10-CM

## 2021-10-08 DIAGNOSIS — R06.2 WHEEZING: ICD-10-CM

## 2021-10-08 DIAGNOSIS — F90.0 ATTENTION DEFICIT HYPERACTIVITY DISORDER (ADHD), PREDOMINANTLY INATTENTIVE TYPE: ICD-10-CM

## 2021-10-08 DIAGNOSIS — F17.200 TOBACCO USE DISORDER: ICD-10-CM

## 2021-10-08 DIAGNOSIS — R35.0 URINARY FREQUENCY: ICD-10-CM

## 2021-10-08 DIAGNOSIS — Z00.00 ROUTINE HISTORY AND PHYSICAL EXAMINATION OF ADULT: Primary | ICD-10-CM

## 2021-10-08 DIAGNOSIS — R82.90 ABNORMAL URINE ODOR: ICD-10-CM

## 2021-10-08 LAB
ALBUMIN UR-MCNC: NEGATIVE MG/DL
APPEARANCE UR: CLEAR
BILIRUB UR QL STRIP: NEGATIVE
CLUE CELLS: PRESENT
COLOR UR AUTO: YELLOW
GLUCOSE UR STRIP-MCNC: NEGATIVE MG/DL
HGB UR QL STRIP: NEGATIVE
KETONES UR STRIP-MCNC: 15 MG/DL
LEUKOCYTE ESTERASE UR QL STRIP: NEGATIVE
NITRATE UR QL: NEGATIVE
PH UR STRIP: 7 [PH] (ref 5–7)
SP GR UR STRIP: 1.02 (ref 1–1.03)
TRICHOMONAS, WET PREP: ABNORMAL
UROBILINOGEN UR STRIP-ACNC: 0.2 E.U./DL
WBC'S/HIGH POWER FIELD, WET PREP: ABNORMAL
YEAST, WET PREP: ABNORMAL

## 2021-10-08 PROCEDURE — 90682 RIV4 VACC RECOMBINANT DNA IM: CPT | Performed by: FAMILY MEDICINE

## 2021-10-08 PROCEDURE — 90471 IMMUNIZATION ADMIN: CPT | Performed by: FAMILY MEDICINE

## 2021-10-08 PROCEDURE — 81003 URINALYSIS AUTO W/O SCOPE: CPT | Performed by: FAMILY MEDICINE

## 2021-10-08 PROCEDURE — 87210 SMEAR WET MOUNT SALINE/INK: CPT | Performed by: FAMILY MEDICINE

## 2021-10-08 PROCEDURE — 99396 PREV VISIT EST AGE 40-64: CPT | Mod: 25 | Performed by: FAMILY MEDICINE

## 2021-10-08 RX ORDER — DEXTROAMPHETAMINE SACCHARATE, AMPHETAMINE ASPARTATE, DEXTROAMPHETAMINE SULFATE AND AMPHETAMINE SULFATE 5; 5; 5; 5 MG/1; MG/1; MG/1; MG/1
20 TABLET ORAL 2 TIMES DAILY
Qty: 60 TABLET | Refills: 0 | Status: SHIPPED | OUTPATIENT
Start: 2021-10-30 | End: 2021-12-02

## 2021-10-08 RX ORDER — NICOTINE 21 MG/24HR
1 PATCH, TRANSDERMAL 24 HOURS TRANSDERMAL EVERY 24 HOURS
Qty: 30 PATCH | Refills: 2 | Status: CANCELLED | OUTPATIENT
Start: 2021-10-08

## 2021-10-08 RX ORDER — METRONIDAZOLE 500 MG/1
500 TABLET ORAL 2 TIMES DAILY
Qty: 14 TABLET | Refills: 0 | Status: SHIPPED | OUTPATIENT
Start: 2021-10-08 | End: 2021-12-02

## 2021-10-08 RX ORDER — ALBUTEROL SULFATE 90 UG/1
2 AEROSOL, METERED RESPIRATORY (INHALATION) EVERY 6 HOURS PRN
Qty: 1 EACH | Refills: 3 | Status: SHIPPED | OUTPATIENT
Start: 2021-10-08 | End: 2022-08-11

## 2021-10-08 RX ORDER — FLUCONAZOLE 150 MG/1
150 TABLET ORAL ONCE
Qty: 1 TABLET | Refills: 0 | Status: SHIPPED | OUTPATIENT
Start: 2021-10-08 | End: 2022-01-05

## 2021-10-08 RX ORDER — NICOTINE 21 MG/24HR
1 PATCH, TRANSDERMAL 24 HOURS TRANSDERMAL EVERY 24 HOURS
Qty: 28 PATCH | Refills: 0 | Status: SHIPPED | OUTPATIENT
Start: 2021-10-08 | End: 2021-11-19

## 2021-10-08 ASSESSMENT — ENCOUNTER SYMPTOMS
DIARRHEA: 0
CHILLS: 0
BREAST MASS: 0
EYE PAIN: 0
SORE THROAT: 0
HEMATOCHEZIA: 0
DIZZINESS: 0
ARTHRALGIAS: 1
HEMATURIA: 0
CONSTIPATION: 1
MYALGIAS: 1
PALPITATIONS: 0
FREQUENCY: 1
ABDOMINAL PAIN: 0
COUGH: 0
NERVOUS/ANXIOUS: 0
SHORTNESS OF BREATH: 0
NAUSEA: 0
PARESTHESIAS: 0
HEADACHES: 1
HEARTBURN: 1
FEVER: 0
WEAKNESS: 0
DYSURIA: 0
JOINT SWELLING: 0

## 2021-10-08 ASSESSMENT — MIFFLIN-ST. JEOR: SCORE: 1431.17

## 2021-10-08 NOTE — PROGRESS NOTES
SUBJECTIVE:   CC: Marlin Lopez is an 51 year old woman who presents for preventive health visit.   Patient has been advised of split billing requirements and indicates understanding: Yes  Healthy Habits:     Getting at least 3 servings of Calcium per day:  Yes    Bi-annual eye exam:  NO    Dental care twice a year:  Yes    Sleep apnea or symptoms of sleep apnea:  None    Diet:  Low salt    Frequency of exercise:  2-3 days/week    Duration of exercise:  15-30 minutes    Taking medications regularly:  Yes    Medication side effects:  None    PHQ-2 Total Score: 0    Additional concerns today:  Yes    PROBLEMS TO ADD ON...     Left subcotal pain x 4 days.   PAP/HPV: will do in [place]    Today's PHQ-2 Score:   PHQ-2 (  [employer]) 10/8/2021   Q1: Little interest or pleasure in doing things 0   Q2: Feeling down, depressed or hopeless 0   PHQ-2 Score 0   Q1: Little interest or pleasure in doing things Not at all   Q2: Feeling down, depressed or hopeless Not at all   PHQ-2 Score 0     Abuse: Current or Past (Physical, Sexual or Emotional) - No  Do you feel safe in your environment? Yes    Have you ever done Advance Care Planning? (For example, a Health Directive, POLST, or a discussion with a medical provider or your loved ones about your wishes): No, advance care planning information given to patient to review.  Patient declined advance care planning discussion at this time.    Social History     Tobacco Use     Smoking status: Former Smoker     Packs/day: 0.50     Types: Cigarettes     Start date: 1980     Quit date: 2015     Years since quittin.0     Smokeless tobacco: Never Used     Tobacco comment:  using e-cig daily   Substance Use Topics     Alcohol use: Yes     Alcohol/week: 0.0 standard drinks     Comment:  drinks 3 days in a week, 2 beers each time      If you drink alcohol do you typically have >3 drinks per day or >7 drinks per week? No    Alcohol Use 10/8/2021   Prescreen: >3 drinks/day  Solumedrol 40mg IV BID  Rocephin and azithromycin started 4/11  Duonebs every 6hr  Dr Solano consulted  No more fever since admission  requiring no more than usual dose of home O2 at 2L NC POX 94-99%  Resume medrol dose pack at d/c   or >7 drinks/week? No   Prescreen: >3 drinks/day or >7 drinks/week? -   No flowsheet data found.    Reviewed orders with patient.  Reviewed health maintenance and updated orders accordingly - Yes  Patient Active Problem List   Diagnosis     Anxiety state     Fibromyalgia     Sleep problems     Recurrent cold sores     Moderate major depression (H)     ADHD (attention deficit hyperactivity disorder)     CARDIOVASCULAR SCREENING; LDL GOAL LESS THAN 130     Family history of colon cancer     Allergic rhinitis     Renal cyst, left     Chronic constipation     Diverticulosis of large intestine without hemorrhage     Ulceration of colon determined by endoscopy     COPD (chronic obstructive pulmonary disease) (H)     Past Surgical History:   Procedure Laterality Date     C REMOVAL OF OVARY(S)      Left, cyst     TONSILLECTOMY         Social History     Tobacco Use     Smoking status: Former Smoker     Packs/day: 0.50     Types: Cigarettes     Start date: 1980     Quit date: 2015     Years since quittin.0     Smokeless tobacco: Never Used     Tobacco comment:  using e-cig daily   Substance Use Topics     Alcohol use: Yes     Alcohol/week: 0.0 standard drinks     Comment:  drinks 3 days in a week, 2 beers each time      Family History   Problem Relation Age of Onset     Hypertension Maternal Grandmother      Cerebrovascular Disease Maternal Grandmother      Alzheimer Disease Maternal Grandmother      Arthritis Maternal Grandmother      Obesity Maternal Grandmother      Cancer Other         Colon     Breast Cancer Other      Cancer - colorectal Paternal Grandfather            Breast Cancer Screening:  Any new diagnosis of family breast, ovarian, or bowel cancer? No    FHS-7:   Breast CA Risk Assessment (FHS-7) 2/3/2021 10/8/2021   Did any of your first-degree relatives have breast or ovarian cancer? No Yes   Did any of your relatives have bilateral breast cancer? No No   Did any man in your family have  breast cancer? No -   Did any woman in your family have breast and ovarian cancer? No -   Did any woman in your family have breast cancer before age 50 y? Yes -   Do you have 2 or more relatives with breast and/or ovarian cancer? No -   Do you have 2 or more relatives with breast and/or bowel cancer? Yes -     click delete button to remove this line now  Mammogram Screening: Recommended annual mammography  Pertinent mammograms are reviewed under the imaging tab.    History of abnormal Pap smear: NO - age 30-65 PAP every 5 years with negative HPV co-testing recommended  PAP / HPV Latest Ref Rng & Units 4/6/2018 8/4/2015 2/22/2012   PAP (Historical) - NIL NIL NIL   HPV16 NEG:Negative Negative - -   HPV18 NEG:Negative Negative - -   HRHPV NEG:Negative Negative - -     Reviewed and updated as needed this visit by clinical staff  Tobacco                Reviewed and updated as needed this visit by Provider                    Review of Systems   Constitutional: Negative for chills and fever.   HENT: Negative for congestion, ear pain, hearing loss and sore throat.    Eyes: Negative for pain and visual disturbance.   Respiratory: Negative for cough and shortness of breath.    Cardiovascular: Positive for chest pain. Negative for palpitations and peripheral edema.   Gastrointestinal: Positive for constipation and heartburn. Negative for abdominal pain, diarrhea, hematochezia and nausea.   Breasts:  Negative for tenderness, breast mass and discharge.   Genitourinary: Positive for frequency and urgency. Negative for dysuria, genital sores, hematuria, pelvic pain, vaginal bleeding and vaginal discharge.   Musculoskeletal: Positive for arthralgias and myalgias. Negative for joint swelling.   Skin: Negative for rash.   Neurological: Positive for headaches. Negative for dizziness, weakness and paresthesias.   Psychiatric/Behavioral: Negative for mood changes. The patient is not nervous/anxious.       OBJECTIVE:   /85 (BP  "Location: Right arm, Cuff Size: Adult Regular)   Pulse 81   Temp 98  F (36.7  C) (Oral)   Resp 19   Ht 1.75 m (5' 8.9\")   Wt 75.8 kg (167 lb 3.2 oz)   LMP 01/03/2014   SpO2 96%   BMI 24.76 kg/m    Physical Exam  GENERAL APPEARANCE: healthy, alert and no distress  EYES: Eyes grossly normal to inspection, PERRL and conjunctivae and sclerae normal  HENT: ear canals and TM's normal, nose and mouth without ulcers or lesions, oropharynx clear and oral mucous membranes moist  NECK: no adenopathy and thyroid normal to palpation  RESP: lungs clear to auscultation - no rales, rhonchi or wheezes  BREAST: deferred  CV: regular rate and rhythm, normal S1 S2, no S3 or S4, no murmur, click or rub, no peripheral edema  ABDOMEN: soft, nontender, no hepatosplenomegaly, no masses and bowel sounds normal  MS: no musculoskeletal defects are noted and gait is age appropriate without ataxia  SKIN: no suspicious lesions or rashes  NEURO: Normal strength and tone, mentation intact and speech normal  PSYCH: mentation appears normal and affect normal/bright    ASSESSMENT/PLAN:   Marlin was seen today for physical.    Diagnoses and all orders for this visit:    Routine history and physical examination of adult    Attention deficit hyperactivity disorder (ADHD), predominantly inattentive type  -     amphetamine-dextroamphetamine (ADDERALL) 20 MG tablet; Take 1 tablet (20 mg) by mouth 2 times daily    Wheezing  -     albuterol (PROAIR HFA/PROVENTIL HFA/VENTOLIN HFA) 108 (90 Base) MCG/ACT inhaler; Inhale 2 puffs into the lungs every 6 hours as needed for shortness of breath / dyspnea or wheezing    Tobacco use    Tobacco use disorder  -     nicotine (NICODERM CQ) 21 MG/24HR 24 hr patch; Place 1 patch onto the skin every 24 hours    Urinary frequency  -     UA reflex to Microscopic - lab collect; Future  -     UA reflex to Microscopic - lab collect  -     UA reflex to Microscopic - lab collect    Abnormal urine odor  -     Wet prep - " "Clinic Collect  -     UA reflex to Microscopic - lab collect; Future  -     UA reflex to Microscopic - lab collect  -     metroNIDAZOLE (FLAGYL) 500 MG tablet; Take 1 tablet (500 mg) by mouth 2 times daily for 7 days  -     fluconazole (DIFLUCAN) 150 MG tablet; Take 1 tablet (150 mg) by mouth once for 1 dose    Other orders  -     REVIEW OF HEALTH MAINTENANCE PROTOCOL ORDERS  -     DC RIV4 (FLUBLOK) VACCINE RECOMBINANT DNA PRSRV ANTIBIO FREE, IM ([id number])        Patient has been advised of split billing requirements and indicates understanding: Yes  COUNSELING:  Reviewed preventive health counseling, as reflected in patient instructions       Regular exercise       Healthy diet/nutrition       Immunizations    Vaccinated for: Influenza      Estimated body mass index is 24.76 kg/m  as calculated from the following:    Height as of this encounter: 1.75 m (5' 8.9\").    Weight as of this encounter: 75.8 kg (167 lb 3.2 oz).    Weight management plan: Discussed healthy diet and exercise guidelines    She reports that she quit smoking about 6 years ago. Her smoking use included cigarettes. She started smoking about 41 years ago. She smoked 0.50 packs per day. She has never used smokeless tobacco.      Counseling Resources:  ATP IV Guidelines  Pooled Cohorts Equation Calculator  Breast Cancer Risk Calculator  BRCA-Related Cancer Risk Assessment: FHS-7 Tool  FRAX Risk Assessment  ICSI Preventive Guidelines  Dietary Guidelines for Americans, 2010  USDA's MyPlate  ASA Prophylaxis  Lung CA Screening    Zach Corona MD  [facility]  "

## 2021-11-17 DIAGNOSIS — F17.200 TOBACCO USE DISORDER: ICD-10-CM

## 2021-11-17 DIAGNOSIS — B00.1 HERPES LABIALIS: ICD-10-CM

## 2021-11-17 DIAGNOSIS — F90.0 ATTENTION DEFICIT HYPERACTIVITY DISORDER (ADHD), PREDOMINANTLY INATTENTIVE TYPE: ICD-10-CM

## 2021-11-18 NOTE — TELEPHONE ENCOUNTER
Calling about med refills. Pt also diagnosed with pleurisy. She is in [place] right now. She is in pain from this. Please advise.Ok to leave a detailed message.

## 2021-11-19 RX ORDER — DEXTROAMPHETAMINE SACCHARATE, AMPHETAMINE ASPARTATE, DEXTROAMPHETAMINE SULFATE AND AMPHETAMINE SULFATE 5; 5; 5; 5 MG/1; MG/1; MG/1; MG/1
20 TABLET ORAL 2 TIMES DAILY
Qty: 60 TABLET | Refills: 0 | OUTPATIENT
Start: 2021-11-19

## 2021-11-19 RX ORDER — NICOTINE 21 MG/24HR
1 PATCH, TRANSDERMAL 24 HOURS TRANSDERMAL EVERY 24 HOURS
Qty: 28 PATCH | Refills: 0 | Status: SHIPPED | OUTPATIENT
Start: 2021-11-19 | End: 2022-03-22

## 2021-11-19 RX ORDER — VALACYCLOVIR HYDROCHLORIDE 1 G/1
TABLET, FILM COATED ORAL
Qty: 20 TABLET | Refills: 2 | Status: SHIPPED | OUTPATIENT
Start: 2021-11-19 | End: 2022-12-13

## 2021-11-19 NOTE — TELEPHONE ENCOUNTER
Patient called is in pain went to the ER 2 weeks ago they did EKG,blood work said she has pleurisy they gave her steroid did not help. She has pain under her left breast wants your opinon because she does not want to go back to the ER she say feels like little balls on her ribs and severe pain when she moves Please call ASAP [phone] also needs her other meds.  Sharon Mcpherson  [facility],

## 2021-11-22 ENCOUNTER — TELEPHONE (OUTPATIENT)
Dept: FAMILY MEDICINE | Facility: CLINIC | Age: 51
End: 2021-11-22
Payer: COMMERCIAL

## 2021-11-22 DIAGNOSIS — R07.89 CHEST WALL PAIN: Primary | ICD-10-CM

## 2021-11-22 NOTE — TELEPHONE ENCOUNTER
Reason for call:  Other   Patient called regarding (reason for call): prescriptions and referral  Additional comments: Pt calling asking if provider can place [facility] referral to [place], [place] for lump on rib cage. Pt was diagnosis with pleurisy this week at ER. Pt is also wondering if he can prescribe steroids. She was prescribed steroids at ER but was only able to relieve pain for a few days. Pt is also want antibiotic and medication listed for yeast infection.  (Pt unable to remember name of medications)    RX to be sent to [id number] - [place], [place] - [address] [facility] [address] [place] [address].     For further information please contact pt at number listed below. Pt states she is in a lot of pain and just wants something to help. ER where is at his full. Routing high priority due to time constraints.     Phone number to reach patient:  Home number on file [phone] (home)    Best Time:  anytime    Can we leave a detailed message on this number?  YES    Travel screening: Not Applicable

## 2021-11-23 RX ORDER — PREDNISONE 20 MG/1
40 TABLET ORAL DAILY
Qty: 10 TABLET | Refills: 0 | Status: SHIPPED | OUTPATIENT
Start: 2021-11-23 | End: 2021-11-28

## 2021-11-23 NOTE — TELEPHONE ENCOUNTER
I cannot order an ultrasound in [place] per current virtual visit guideline (providing treatment out of state) as I do not have a [place] license.    Concerning pleurisy; is usually treated with NSAIDs or colchicine, a steroid can be used but short term. In addition pleurisy is usually secondary; so not sure what the ER provider found. Will do a short course but she needs to be seen there if persisting

## 2021-11-23 NOTE — TELEPHONE ENCOUNTER
Spoke with pt. Gave her provider's message as written. States she has been taking Ibuprofen with no relief. She will try the prednisone. 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 She will look into seeing a provider for this concern in [place].    Clementina Hartley RN  [facility]

## 2021-11-23 NOTE — TELEPHONE ENCOUNTER
"Patient calling upset that she has left messages for provider since last week but have not heard back.  She is currently in [place]  Went to ED twice there due to left sided/rib pain.  She stated that she was diagnosed with pleurisy and was put on steroids x 1 week.  Symptoms improved some while on steroids but is getting worse again.    Patient asking for further treatment and possible US for further eval.  Explained that provider is not licensed in [place] and would not be able to address/treat unless it was an issue we saw her for while in [place].  Patient stated that she has these symptoms on and off for the past year.  Had pain on the right side before but this time, it is on the left side.   Per patient, last time she came in on 10/8/2021, she did mention that she had the pain under her breast.  Patient got upset and did not want to provide any further info.  She stated that she has left enough info with the clinic over the past week and that she would like to talk with provider directly  She stated, \"I don't need to be questioned by you\"  Explained that she is not being questioned but that the nurses are here to assist provider in gathering more info to help her  Patient upset and wanting to speak with provider only      Justyn Weldon RN  [facility]      "

## 2021-12-02 DIAGNOSIS — N89.8 VAGINAL DISCHARGE: ICD-10-CM

## 2021-12-02 DIAGNOSIS — F90.0 ATTENTION DEFICIT HYPERACTIVITY DISORDER (ADHD), PREDOMINANTLY INATTENTIVE TYPE: ICD-10-CM

## 2021-12-02 DIAGNOSIS — R82.90 ABNORMAL URINE ODOR: ICD-10-CM

## 2021-12-02 RX ORDER — FLUCONAZOLE 150 MG/1
150 TABLET ORAL ONCE
Qty: 1 TABLET | Refills: 0 | Status: CANCELLED | OUTPATIENT
Start: 2021-12-02 | End: 2021-12-02

## 2021-12-02 RX ORDER — METRONIDAZOLE 500 MG/1
TABLET ORAL
Status: CANCELLED | OUTPATIENT
Start: 2021-12-02

## 2021-12-02 RX ORDER — METRONIDAZOLE 500 MG/1
500 TABLET ORAL 2 TIMES DAILY
Qty: 14 TABLET | Refills: 0 | Status: SHIPPED | OUTPATIENT
Start: 2021-12-02 | End: 2022-03-22

## 2021-12-02 RX ORDER — DEXTROAMPHETAMINE SACCHARATE, AMPHETAMINE ASPARTATE, DEXTROAMPHETAMINE SULFATE AND AMPHETAMINE SULFATE 5; 5; 5; 5 MG/1; MG/1; MG/1; MG/1
20 TABLET ORAL 2 TIMES DAILY
Qty: 60 TABLET | Refills: 0 | Status: SHIPPED | OUTPATIENT
Start: 2021-12-02 | End: 2021-12-31

## 2021-12-02 NOTE — TELEPHONE ENCOUNTER
Reason for call:  Medication   If this is a refill request, has the caller requested the refill from the pharmacy already? No  Will the patient be using a [facility]? No  Name of the pharmacy and phone number for the current request:  [id number] [place], [place] - [address] AT [facility] [address]    Name of the medication requested: amphetamine-dextroamphetamine (ADDERALL) 20 MG tablet metroNIDAZOLE (FLAGYL) 500 MG tablet & fluconazole (DIFLUCAN) 150 MG tablet    Other request: Patient is calling in, she wanted to know who is covering for Zach Corona MD well he is out. She was informed it is a team that support covering for the provider.  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 That Dr. Corona gives her the above time to time as she is prone to BV. Then from that she needs diflucan.    Patient is currently out of medications. Please send over ASAP. Thank you.    Phone number to reach patient:  Cell number on file:    Telephone Information:   Mobile [phone]     Best Time:      Can we leave a detailed message on this number?  YES    Travel screening: Not Applicable

## 2021-12-02 NOTE — TELEPHONE ENCOUNTER
Please call patient:     Signed Prescriptions:                        Disp   Refills    amphetamine-dextroamphetamine (ADDERALL) 2*60 tab*0        Sig: Take 1 tablet (20 mg) by mouth 2 times daily  Authorizing Provider: ESTEPHANIA MONTIEL    metroNIDAZOLE (FLAGYL) 500 MG tablet       14 tab*0        Sig: Take 1 tablet (500 mg) by mouth 2 times daily  Authorizing Provider: ESTEPHANIA MONTIEL      I don't recommend treatment with diflucan without wet prep test     Estephania Montiel MD

## 2021-12-31 ENCOUNTER — TELEPHONE (OUTPATIENT)
Dept: FAMILY MEDICINE | Facility: CLINIC | Age: 51
End: 2021-12-31
Payer: COMMERCIAL

## 2021-12-31 DIAGNOSIS — M54.50 CHRONIC BILATERAL LOW BACK PAIN WITHOUT SCIATICA: ICD-10-CM

## 2021-12-31 DIAGNOSIS — G89.29 CHRONIC BILATERAL LOW BACK PAIN WITHOUT SCIATICA: ICD-10-CM

## 2021-12-31 DIAGNOSIS — G47.00 INSOMNIA, UNSPECIFIED TYPE: ICD-10-CM

## 2021-12-31 DIAGNOSIS — R82.90 ABNORMAL URINE ODOR: ICD-10-CM

## 2021-12-31 DIAGNOSIS — F90.0 ATTENTION DEFICIT HYPERACTIVITY DISORDER (ADHD), PREDOMINANTLY INATTENTIVE TYPE: ICD-10-CM

## 2021-12-31 RX ORDER — DEXTROAMPHETAMINE SACCHARATE, AMPHETAMINE ASPARTATE, DEXTROAMPHETAMINE SULFATE AND AMPHETAMINE SULFATE 5; 5; 5; 5 MG/1; MG/1; MG/1; MG/1
20 TABLET ORAL 2 TIMES DAILY
Qty: 60 TABLET | Refills: 0 | Status: SHIPPED | OUTPATIENT
Start: 2022-01-01 | End: 2022-01-31

## 2021-12-31 NOTE — TELEPHONE ENCOUNTER
Reason for call:  Medication   If this is a refill request, has the caller requested the refill from the pharmacy already? No  Will the patient be using a [facility]? No  Name of the pharmacy and phone number for the current request:  [id number] [place], [place] - [address] AT [facility] &  [address]    Name of the medication requested: cyclobenzaprine (FLEXERIL) 10 MG tablet, traZODone (DESYREL) 50 MG tablet, and fluconazole (DIFLUCAN) 150 MG tablet     Other request: Called pt about other medication and pt asked if other medication could be sent to pharm.    Phone number to reach patient:  Home number on file [phone] (home)    Best Time:  anytime    Can we leave a detailed message on this number?  YES    Travel screening: Not Applicable

## 2021-12-31 NOTE — TELEPHONE ENCOUNTER
Called pt and informed her that medications have been sent to pharm. Pt asked for refill on other medications. New encounter created for other meds.  Marylu Sheikh-  Jamel

## 2021-12-31 NOTE — TELEPHONE ENCOUNTER
Reason for call:  Medication   If this is a refill request, has the caller requested the refill from the pharmacy already? No  Will the patient be using a [facility]? No  Name of the pharmacy and phone number for the current request:  [id number] [place], [place] - [address] AT [facility] [address]    Name of the medication requested: amphetamine-dextroamphetamine (ADDERALL) 20 MG tablet    Other request: Pt calling asking for refill on medication    Phone number to reach patient:  Home number on file [phone] (home)    Best Time:  anytime    Can we leave a detailed message on this number?  YES    Travel screening: Not Applicable

## 2022-01-05 RX ORDER — FLUCONAZOLE 150 MG/1
150 TABLET ORAL ONCE
Qty: 1 TABLET | Refills: 0 | Status: SHIPPED | OUTPATIENT
Start: 2022-01-05 | End: 2022-01-05

## 2022-01-05 RX ORDER — TRAZODONE HYDROCHLORIDE 50 MG/1
TABLET, FILM COATED ORAL
Qty: 90 TABLET | Refills: 0 | Status: SHIPPED | OUTPATIENT
Start: 2022-01-05 | End: 2022-03-22

## 2022-01-05 RX ORDER — CYCLOBENZAPRINE HCL 10 MG
10 TABLET ORAL 3 TIMES DAILY
Qty: 30 TABLET | Refills: 0 | Status: SHIPPED | OUTPATIENT
Start: 2022-01-05 | End: 2022-02-16

## 2022-01-05 NOTE — TELEPHONE ENCOUNTER
Called and informed pt rx was sent by Dr. Corona, pt verbalized understanding.    Ester Lawson MA on 1/5/2022 at 5:39 PM

## 2022-02-16 ENCOUNTER — TELEPHONE (OUTPATIENT)
Dept: FAMILY MEDICINE | Facility: CLINIC | Age: 52
End: 2022-02-16
Payer: COMMERCIAL

## 2022-02-16 DIAGNOSIS — G89.29 CHRONIC BILATERAL LOW BACK PAIN WITHOUT SCIATICA: ICD-10-CM

## 2022-02-16 DIAGNOSIS — M54.50 CHRONIC BILATERAL LOW BACK PAIN WITHOUT SCIATICA: ICD-10-CM

## 2022-02-16 DIAGNOSIS — F90.9 ATTENTION DEFICIT HYPERACTIVITY DISORDER (ADHD), UNSPECIFIED ADHD TYPE: ICD-10-CM

## 2022-02-16 RX ORDER — DEXTROAMPHETAMINE SACCHARATE, AMPHETAMINE ASPARTATE, DEXTROAMPHETAMINE SULFATE AND AMPHETAMINE SULFATE 5; 5; 5; 5 MG/1; MG/1; MG/1; MG/1
20 TABLET ORAL 2 TIMES DAILY
Qty: 60 TABLET | Refills: 0 | Status: SHIPPED | OUTPATIENT
Start: 2022-02-16 | End: 2022-03-22

## 2022-02-16 RX ORDER — CYCLOBENZAPRINE HCL 10 MG
10 TABLET ORAL 3 TIMES DAILY
Qty: 30 TABLET | Refills: 2 | Status: SHIPPED | OUTPATIENT
Start: 2022-02-16 | End: 2022-03-22

## 2022-02-16 NOTE — TELEPHONE ENCOUNTER
Patient called the clinic requesting refills for cyclobenzaprine (FLEXERIL) 10 MG tablet and amphetamine-dextroamphetamine (ADDERALL) 20 MG per tablet.    Patient reports that she does not always take the amphetamine-dextroamphetamine (ADDERALL) 20 MG per tablet 2 times a day. So she had enough to last her until 1 week ago.    Requested Prescriptions   Pending Prescriptions Disp Refills     amphetamine-dextroamphetamine (ADDERALL) 20 MG tablet 60 tablet 0     Sig: Take 1 tablet (20 mg) by mouth 2 times daily       There is no refill protocol information for this order        cyclobenzaprine (FLEXERIL) 10 MG tablet 30 tablet 0     Sig: Take 1 tablet (10 mg) by mouth 3 times daily       There is no refill protocol information for this order        Routing refill request to provider for review/approval because:  Drug not on the [facility] refill protocol

## 2022-03-21 DIAGNOSIS — F17.200 TOBACCO USE DISORDER: ICD-10-CM

## 2022-03-21 DIAGNOSIS — R82.90 ABNORMAL URINE ODOR: ICD-10-CM

## 2022-03-21 DIAGNOSIS — G47.00 INSOMNIA, UNSPECIFIED TYPE: ICD-10-CM

## 2022-03-21 DIAGNOSIS — M79.7 FIBROMYALGIA: ICD-10-CM

## 2022-03-21 DIAGNOSIS — F90.9 ATTENTION DEFICIT HYPERACTIVITY DISORDER (ADHD), UNSPECIFIED ADHD TYPE: ICD-10-CM

## 2022-03-21 DIAGNOSIS — H57.9 ITCHY EYES: ICD-10-CM

## 2022-03-21 DIAGNOSIS — M54.50 CHRONIC BILATERAL LOW BACK PAIN WITHOUT SCIATICA: ICD-10-CM

## 2022-03-21 DIAGNOSIS — G89.29 CHRONIC BILATERAL LOW BACK PAIN WITHOUT SCIATICA: ICD-10-CM

## 2022-03-21 NOTE — TELEPHONE ENCOUNTER
Reason for Call:  Other prescription    Detailed comments: patient states that she has been taking Adderall as well. That medication is not listed on her med list      Phone Number Patient can be reached at: Home number on file [phone] (home)    Best Time: Days    Can we leave a detailed message on this number? YES    Call taken on 3/21/2022 at 8:07 AM by BREN SEQUEIRA

## 2022-03-22 RX ORDER — CYCLOBENZAPRINE HCL 10 MG
10 TABLET ORAL 3 TIMES DAILY
Qty: 30 TABLET | Refills: 2 | Status: SHIPPED | OUTPATIENT
Start: 2022-03-22 | End: 2022-05-13

## 2022-03-22 RX ORDER — FEXOFENADINE HCL 180 MG/1
180 TABLET ORAL DAILY
Qty: 30 TABLET | Refills: 2 | Status: SHIPPED | OUTPATIENT
Start: 2022-03-22

## 2022-03-22 RX ORDER — METRONIDAZOLE 500 MG/1
500 TABLET ORAL 2 TIMES DAILY
Qty: 14 TABLET | Refills: 0 | Status: SHIPPED | OUTPATIENT
Start: 2022-03-22 | End: 2022-06-20

## 2022-03-22 RX ORDER — NICOTINE 21 MG/24HR
1 PATCH, TRANSDERMAL 24 HOURS TRANSDERMAL EVERY 24 HOURS
Qty: 28 PATCH | Refills: 1 | Status: SHIPPED | OUTPATIENT
Start: 2022-03-22 | End: 2022-05-13

## 2022-03-22 RX ORDER — DEXTROAMPHETAMINE SACCHARATE, AMPHETAMINE ASPARTATE, DEXTROAMPHETAMINE SULFATE AND AMPHETAMINE SULFATE 5; 5; 5; 5 MG/1; MG/1; MG/1; MG/1
20 TABLET ORAL 2 TIMES DAILY
Qty: 60 TABLET | Refills: 0 | Status: SHIPPED | OUTPATIENT
Start: 2022-03-22 | End: 2022-05-13

## 2022-03-22 RX ORDER — IBUPROFEN 800 MG/1
TABLET, FILM COATED ORAL
Qty: 90 TABLET | Refills: 0 | Status: SHIPPED | OUTPATIENT
Start: 2022-03-22 | End: 2022-05-13

## 2022-03-22 RX ORDER — TRAZODONE HYDROCHLORIDE 50 MG/1
TABLET, FILM COATED ORAL
Qty: 90 TABLET | Refills: 0 | Status: SHIPPED | OUTPATIENT
Start: 2022-03-22 | End: 2022-09-23

## 2022-03-22 NOTE — TELEPHONE ENCOUNTER
Routing refill request to provider for review/approval because:  Drug not on the [facility] refill protocol   A break in medication    Pending Prescriptions:                       Disp   Refills    cyclobenzaprine (FLEXERIL) 10 MG tablet    30 tab*2        Sig: Take 1 tablet (10 mg) by mouth 3 times daily    nicotine (NICODERM CQ) 21 MG/24HR 24 hr pa*28 pat*0        Sig: Place 1 patch onto the skin every 24 hours    traZODone (DESYREL) 50 MG tablet           90 tab*0        Sig: TAKE 1 TABLET(50 MG) BY MOUTH AT BEDTIME    ibuprofen (ADVIL/MOTRIN) 800 MG tablet     90 tab*0          ketotifen (EYE ITCH RELIEF) 0.025 % ophtha*5 mL   0        Sig: INSTILL 1 DROP IN BOTH EYES TWICE DAILY    fexofenadine (ALLEGRA) 180 MG tablet       30 tab*2        Sig: Take 1 tablet (180 mg) by mouth daily    metroNIDAZOLE (FLAGYL) 500 MG tablet       14 tab*0        Sig: Take 1 tablet (500 mg) by mouth 2 times daily    amphetamine-dextroamphetamine (ADDERALL) 2*60 tab*0        Sig: Take 1 tablet (20 mg) by mouth 2 times daily

## 2022-04-13 ENCOUNTER — TELEPHONE (OUTPATIENT)
Dept: FAMILY MEDICINE | Facility: CLINIC | Age: 52
End: 2022-04-13
Payer: COMMERCIAL

## 2022-04-13 DIAGNOSIS — M79.7 FIBROMYALGIA: ICD-10-CM

## 2022-04-13 DIAGNOSIS — R07.89 CHEST WALL PAIN: ICD-10-CM

## 2022-04-13 RX ORDER — IBUPROFEN 800 MG/1
TABLET, FILM COATED ORAL
Qty: 90 TABLET | Refills: 0 | Status: CANCELLED | OUTPATIENT
Start: 2022-04-13

## 2022-04-13 RX ORDER — PREDNISONE 20 MG/1
40 TABLET ORAL DAILY
Qty: 10 TABLET | Refills: 0 | Status: CANCELLED | OUTPATIENT
Start: 2022-04-13

## 2022-04-13 NOTE — TELEPHONE ENCOUNTER
Has history of pleurisy and Zach Khoury has prescribed prednisone for this before (see 11/22/2021 phone encounter)  Unable to triage or do virtual visit with patient while she is out of state    Would provider be able to prescribe something or should patient seek care in [place]?    Justyn Weldon RN  [facility]

## 2022-04-13 NOTE — TELEPHONE ENCOUNTER
Needs to be seen if symptoms serious to need prednisone; further more combining high dose ibuprofen and prednisone is bad for the stomach; can cause severe gastritis with risk of perforation

## 2022-04-13 NOTE — TELEPHONE ENCOUNTER
Attempted to call pt at [phone]. This was the first attempt at calling and voice message left to return call to clinic at [phone].    Please relay message from Dr. Corona.     Rona BARNEY RN, BSN  [facility]

## 2022-04-13 NOTE — TELEPHONE ENCOUNTER
Patient states she is having pain under her breast above her ribs on both sides. Has had pains on and off for years. Patient asking if rx for prednisone, and Ibuprofen can be sent to [facility] in [place].

## 2022-04-15 NOTE — TELEPHONE ENCOUNTER
Patient notified of provider's message as written. Patient verbalized understanding.     Mireya Stubbs RN   [facility]

## 2022-05-12 DIAGNOSIS — F17.200 TOBACCO USE DISORDER: ICD-10-CM

## 2022-05-12 DIAGNOSIS — F90.9 ATTENTION DEFICIT HYPERACTIVITY DISORDER (ADHD), UNSPECIFIED ADHD TYPE: ICD-10-CM

## 2022-05-12 DIAGNOSIS — G89.29 CHRONIC BILATERAL LOW BACK PAIN WITHOUT SCIATICA: ICD-10-CM

## 2022-05-12 DIAGNOSIS — M79.7 FIBROMYALGIA: ICD-10-CM

## 2022-05-12 DIAGNOSIS — M54.50 CHRONIC BILATERAL LOW BACK PAIN WITHOUT SCIATICA: ICD-10-CM

## 2022-05-13 RX ORDER — NICOTINE 21 MG/24HR
1 PATCH, TRANSDERMAL 24 HOURS TRANSDERMAL EVERY 24 HOURS
Qty: 28 PATCH | Refills: 1 | Status: SHIPPED | OUTPATIENT
Start: 2022-05-13 | End: 2022-08-12

## 2022-05-13 RX ORDER — IBUPROFEN 800 MG/1
TABLET, FILM COATED ORAL
Qty: 90 TABLET | Refills: 0 | Status: SHIPPED | OUTPATIENT
Start: 2022-05-13 | End: 2022-07-06

## 2022-05-13 RX ORDER — CYCLOBENZAPRINE HCL 10 MG
10 TABLET ORAL 3 TIMES DAILY
Qty: 30 TABLET | Refills: 2 | Status: SHIPPED | OUTPATIENT
Start: 2022-05-13 | End: 2022-08-12

## 2022-05-13 RX ORDER — DEXTROAMPHETAMINE SACCHARATE, AMPHETAMINE ASPARTATE, DEXTROAMPHETAMINE SULFATE AND AMPHETAMINE SULFATE 5; 5; 5; 5 MG/1; MG/1; MG/1; MG/1
20 TABLET ORAL 2 TIMES DAILY
Qty: 60 TABLET | Refills: 0 | Status: SHIPPED | OUTPATIENT
Start: 2022-05-13 | End: 2022-07-06

## 2022-05-13 NOTE — TELEPHONE ENCOUNTER
Nicotine-Prescription approved per [facility] Refill Protocol.    Mireya Stubbs RN   [facility]

## 2022-05-13 NOTE — TELEPHONE ENCOUNTER
"Routing refill request to provider for review/approval because:  Labs not current:  ALT, AST, CBC, and creatinine. Medications not on protocol.     Requested Prescriptions   Pending Prescriptions Disp Refills    ibuprofen (ADVIL/MOTRIN) 800 MG tablet 90 tablet 0     Sig: TAKE 1 TABLET(800 MG) BY MOUTH EVERY 8 HOURS AS NEEDED FOR MODERATE PAIN        NSAID Medications Failed - 5/12/2022  7:50 AM        Failed - Normal ALT on file in past 12 months     Recent Labs   Lab Test 01/15/19  0000   ALT 28               Failed - Normal AST on file in past 12 months       Recent Labs   Lab Test 01/15/19  0000   AST 16             Failed - Normal CBC on file in past 12 months     Recent Labs   Lab Test 02/06/18  1254   WBC 7.7   RBC 3.92   HGB 12.1   HCT 35.6                      Failed - Normal serum creatinine on file in past 12 months     Recent Labs   Lab Test 02/03/21  1219   CR 0.74       Ok to refill medication if creatinine is low          Passed - Blood pressure under 140/90 in past 12 months       BP Readings from Last 3 Encounters:   10/08/21 121/85   02/03/21 110/64   12/27/19 108/66                 Passed - Recent (12 mo) or future (30 days) visit within the authorizing provider's specialty     Patient has had an office visit with the authorizing provider or a provider within the authorizing providers department within the previous 12 mos or has a future within next 30 days. See \"Patient Info\" tab in inbasket, or \"Choose Columns\" in Meds & Orders section of the refill encounter.              Passed - Patient is age 6-64 years        Passed - Medication is active on med list        Passed - No active pregnancy on record        Passed - No positive pregnancy test in past 12 months           cyclobenzaprine (FLEXERIL) 10 MG tablet 30 tablet 2     Sig: Take 1 tablet (10 mg) by mouth 3 times daily        There is no refill protocol information for this order        amphetamine-dextroamphetamine (ADDERALL) 20 MG " "tablet 60 tablet 0     Sig: Take 1 tablet (20 mg) by mouth 2 times daily        There is no refill protocol information for this order       Signed Prescriptions Disp Refills    nicotine (NICODERM CQ) 21 MG/24HR 24 hr patch 28 patch 1     Sig: Place 1 patch onto the skin every 24 hours        Partial Cholinergic Nicotinic Agonist Agents Passed - 5/12/2022  7:50 AM        Passed - Blood pressure under 140/90 in past 12 months       BP Readings from Last 3 Encounters:   10/08/21 121/85   02/03/21 110/64   12/27/19 108/66                 Passed - Recent (12 mo) or future (30 days) visit within the authorizing provider's specialty     Patient has had an office visit with the authorizing provider or a provider within the authorizing providers department within the previous 12 mos or has a future within next 30 days. See \"Patient Info\" tab in inbasket, or \"Choose Columns\" in Meds & Orders section of the refill encounter.              Passed - Medication is active on med list        Passed - Patient is 18 years of age or older        Passed - Patient is not pregnant        Passed - No positive pregnancy test on file in past 12 months              Mireya Stubbs RN   [facility]       "

## 2022-05-17 NOTE — TELEPHONE ENCOUNTER
I called and spoke with the patient letting her know her prescriptions have been filled and that Dr. Corona would like to see her in clinic for a medication check and blood work. She said she would call the clinic back to schedule the appointment.    Angelique Reinoso [facility]

## 2022-05-22 DIAGNOSIS — B96.89 BV (BACTERIAL VAGINOSIS): ICD-10-CM

## 2022-05-22 DIAGNOSIS — B00.1 RECURRENT COLD SORES: ICD-10-CM

## 2022-05-22 DIAGNOSIS — N76.0 BV (BACTERIAL VAGINOSIS): ICD-10-CM

## 2022-05-23 RX ORDER — METRONIDAZOLE 7.5 MG/G
GEL VAGINAL
Qty: 70 G | Refills: 5 | OUTPATIENT
Start: 2022-05-23

## 2022-05-23 RX ORDER — ACYCLOVIR 50 MG/G
CREAM TOPICAL
Qty: 5 G | Refills: 3 | OUTPATIENT
Start: 2022-05-23

## 2022-05-23 NOTE — TELEPHONE ENCOUNTER
Meds were discontinued in 2020 and 2021. Pt needs appt if having symptoms.     Thanks,  ALISIA Valentin  [facility]

## 2022-06-06 NOTE — TELEPHONE ENCOUNTER
Talked to patient; will evaluate with a pelvic ultrasound and will follow up for the other concerns   Acitretin Pregnancy And Lactation Text: This medication is Pregnancy Category X and should not be given to women who are pregnant or may become pregnant in the future. This medication is excreted in breast milk.

## 2022-06-17 DIAGNOSIS — M79.7 FIBROMYALGIA: ICD-10-CM

## 2022-06-17 DIAGNOSIS — F90.9 ATTENTION DEFICIT HYPERACTIVITY DISORDER (ADHD), UNSPECIFIED ADHD TYPE: ICD-10-CM

## 2022-06-17 DIAGNOSIS — R82.90 ABNORMAL URINE ODOR: ICD-10-CM

## 2022-06-17 NOTE — TELEPHONE ENCOUNTER
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 That Dr. Corona gives her the above time to time as she is prone to BV. Then from that she needs diflucan.\"      Justyn Weldon RN  [facility]    "

## 2022-06-20 RX ORDER — METRONIDAZOLE 500 MG/1
TABLET ORAL
Qty: 14 TABLET | Refills: 0 | Status: SHIPPED | OUTPATIENT
Start: 2022-06-20

## 2022-06-23 NOTE — TELEPHONE ENCOUNTER
Called and spoke with patient letting her know that her prescription has been refilled for a one time only fill and she needs to make an appointment for anymore refills. Patient would like a call back from the provider on why she has to come in. Please reach out to patient.    nAgelique Reinoso [facility]

## 2022-06-23 NOTE — TELEPHONE ENCOUNTER
Dr. Corona,    Patient comes in regularly for physicals and next physical not due until October of this year.     Thanks,  ALISIA Valentin  [facility]

## 2022-06-28 NOTE — TELEPHONE ENCOUNTER
Justyn Weldon, ALISIA routed conversation to [facility] [employer] 6 minutes ago (11:59 AM)    Justyn Weldon RN 7 minutes ago (11:58 AM)    Please let patient know that coming in yearly for a physical does not mean one does not need evaluation for interval symptoms; sometimes needs labs are needed to justify some treatments, some conditions also can be treated virtually without coming into the office (needs documentation). Additionally, some medications especially antibiotics cannot be refilled continually without evaluation.     Zach Corona MD    Documentation     Zach Corona MD Fz Rn Triage Pool 19 hours ago (4:33 PM)

## 2022-06-28 NOTE — TELEPHONE ENCOUNTER
Please let patient know that coming in yearly for a physical does not mean one does not need evaluation for interval symptoms; sometimes needs labs are needed to justify some treatments, some conditions also can be treated virtually without coming into the office (needs documentation). Additionally, some medications especially antibiotics cannot be refilled continually without evaluation.     Zach Corona MD

## 2022-06-30 NOTE — TELEPHONE ENCOUNTER
Called and spoke with pt.    Relayed message from Dr. Corona.    Pt states that she is in [place] taking care of a dying family member and needs her medications refilled as she is not able to leave the state due to taking care of the family member.     Pt wants Dr. Corona to call her directly to discuss the matter.    She wants her Adderall and Flexeril refilled as she is out.    Routing to PCP    Yoselyn Orantes MA

## 2022-07-05 NOTE — TELEPHONE ENCOUNTER
Spoke to patient let her know that medication has being send to pharmacy.    Patient state would like refill and ibuprofen and adderall send to pharmacy, patient state would do telephone visit and cant come in due to out of town taking care of a dying family member and wont be back in [place] for about 4-6 month     Rehana Quevedo MA

## 2022-07-06 DIAGNOSIS — M79.7 FIBROMYALGIA: ICD-10-CM

## 2022-07-06 DIAGNOSIS — F90.9 ATTENTION DEFICIT HYPERACTIVITY DISORDER (ADHD), UNSPECIFIED ADHD TYPE: ICD-10-CM

## 2022-07-06 RX ORDER — DEXTROAMPHETAMINE SACCHARATE, AMPHETAMINE ASPARTATE, DEXTROAMPHETAMINE SULFATE AND AMPHETAMINE SULFATE 5; 5; 5; 5 MG/1; MG/1; MG/1; MG/1
20 TABLET ORAL 2 TIMES DAILY
Qty: 60 TABLET | Refills: 0 | Status: SHIPPED | OUTPATIENT
Start: 2022-07-06 | End: 2022-08-12

## 2022-07-06 RX ORDER — IBUPROFEN 800 MG/1
TABLET, FILM COATED ORAL
Qty: 90 TABLET | Refills: 0 | Status: SHIPPED | OUTPATIENT
Start: 2022-07-06 | End: 2022-09-23

## 2022-08-11 DIAGNOSIS — H57.9 ITCHY EYES: ICD-10-CM

## 2022-08-11 DIAGNOSIS — F17.200 TOBACCO USE DISORDER: ICD-10-CM

## 2022-08-11 DIAGNOSIS — F90.9 ATTENTION DEFICIT HYPERACTIVITY DISORDER (ADHD), UNSPECIFIED ADHD TYPE: ICD-10-CM

## 2022-08-11 DIAGNOSIS — R06.2 WHEEZING: ICD-10-CM

## 2022-08-11 DIAGNOSIS — M54.50 CHRONIC BILATERAL LOW BACK PAIN WITHOUT SCIATICA: ICD-10-CM

## 2022-08-11 DIAGNOSIS — G89.29 CHRONIC BILATERAL LOW BACK PAIN WITHOUT SCIATICA: ICD-10-CM

## 2022-08-11 NOTE — TELEPHONE ENCOUNTER
Routing refill request to provider for review/approval because:  Failed protocol.      Carol Verdin RN

## 2022-08-12 RX ORDER — NICOTINE 21 MG/24HR
1 PATCH, TRANSDERMAL 24 HOURS TRANSDERMAL EVERY 24 HOURS
Qty: 28 PATCH | Refills: 1 | Status: SHIPPED | OUTPATIENT
Start: 2022-08-12 | End: 2022-12-13

## 2022-08-12 RX ORDER — CYCLOBENZAPRINE HCL 10 MG
10 TABLET ORAL 3 TIMES DAILY
Qty: 30 TABLET | Refills: 2 | Status: SHIPPED | OUTPATIENT
Start: 2022-08-12 | End: 2022-10-12

## 2022-08-12 RX ORDER — DEXTROAMPHETAMINE SACCHARATE, AMPHETAMINE ASPARTATE, DEXTROAMPHETAMINE SULFATE AND AMPHETAMINE SULFATE 5; 5; 5; 5 MG/1; MG/1; MG/1; MG/1
20 TABLET ORAL 2 TIMES DAILY
Qty: 60 TABLET | Refills: 0 | Status: SHIPPED | OUTPATIENT
Start: 2022-08-12 | End: 2022-10-12

## 2022-08-12 RX ORDER — ALBUTEROL SULFATE 90 UG/1
2 AEROSOL, METERED RESPIRATORY (INHALATION) EVERY 6 HOURS PRN
Qty: 18 G | Refills: 2 | Status: SHIPPED | OUTPATIENT
Start: 2022-08-12 | End: 2022-12-13

## 2022-09-23 DIAGNOSIS — M79.7 FIBROMYALGIA: ICD-10-CM

## 2022-09-23 DIAGNOSIS — G47.00 INSOMNIA, UNSPECIFIED TYPE: ICD-10-CM

## 2022-09-23 RX ORDER — TRAZODONE HYDROCHLORIDE 50 MG/1
TABLET, FILM COATED ORAL
Qty: 90 TABLET | Refills: 0 | Status: SHIPPED | OUTPATIENT
Start: 2022-09-23 | End: 2022-10-12

## 2022-09-23 RX ORDER — IBUPROFEN 800 MG/1
TABLET, FILM COATED ORAL
Qty: 90 TABLET | Refills: 0 | Status: SHIPPED | OUTPATIENT
Start: 2022-09-23 | End: 2022-12-13

## 2022-09-26 NOTE — TELEPHONE ENCOUNTER
Spoke to patient patient is still out of town will make appointment when back in [place]    Rehana Quevedo MA

## 2022-10-12 DIAGNOSIS — G47.00 INSOMNIA, UNSPECIFIED TYPE: ICD-10-CM

## 2022-10-12 DIAGNOSIS — F90.9 ATTENTION DEFICIT HYPERACTIVITY DISORDER (ADHD), UNSPECIFIED ADHD TYPE: ICD-10-CM

## 2022-10-12 DIAGNOSIS — G89.29 CHRONIC BILATERAL LOW BACK PAIN WITHOUT SCIATICA: ICD-10-CM

## 2022-10-12 DIAGNOSIS — M54.50 CHRONIC BILATERAL LOW BACK PAIN WITHOUT SCIATICA: ICD-10-CM

## 2022-10-12 RX ORDER — TRAZODONE HYDROCHLORIDE 50 MG/1
50 TABLET, FILM COATED ORAL AT BEDTIME
Qty: 30 TABLET | Refills: 0 | Status: SHIPPED | OUTPATIENT
Start: 2022-10-12 | End: 2022-12-13

## 2022-10-12 RX ORDER — DEXTROAMPHETAMINE SACCHARATE, AMPHETAMINE ASPARTATE, DEXTROAMPHETAMINE SULFATE AND AMPHETAMINE SULFATE 5; 5; 5; 5 MG/1; MG/1; MG/1; MG/1
20 TABLET ORAL 2 TIMES DAILY
Qty: 60 TABLET | Refills: 0 | Status: SHIPPED | OUTPATIENT
Start: 2022-10-12 | End: 2022-12-13

## 2022-10-12 RX ORDER — CYCLOBENZAPRINE HCL 10 MG
10 TABLET ORAL 3 TIMES DAILY
Qty: 30 TABLET | Refills: 0 | Status: SHIPPED | OUTPATIENT
Start: 2022-10-12 | End: 2022-12-13

## 2022-10-12 NOTE — TELEPHONE ENCOUNTER
Reason for Call:  Medication or medication refill:    Do you use a [facility]?  Name of the pharmacy and phone number for the current request:  [id number] [address] AT [place] [address] (Ph: [phone])    Name of the medication requested: Flexeril, Adderall, and Trazodone    Other request: none    Can we leave a detailed message on this number? YES    Phone number patient can be reached at: Home number on file [phone] (home)    Best Time: any    Call taken on 10/12/2022 at 10:22 AM by Farrah Amos

## 2022-12-02 DIAGNOSIS — M54.50 CHRONIC BILATERAL LOW BACK PAIN WITHOUT SCIATICA: ICD-10-CM

## 2022-12-02 DIAGNOSIS — G89.29 CHRONIC BILATERAL LOW BACK PAIN WITHOUT SCIATICA: ICD-10-CM

## 2022-12-02 DIAGNOSIS — R07.89 CHEST WALL PAIN: ICD-10-CM

## 2022-12-02 RX ORDER — CYCLOBENZAPRINE HCL 10 MG
10 TABLET ORAL 3 TIMES DAILY
Qty: 30 TABLET | Refills: 0 | OUTPATIENT
Start: 2022-12-02

## 2022-12-02 RX ORDER — PREDNISONE 20 MG/1
40 TABLET ORAL DAILY
Qty: 10 TABLET | Refills: 0 | OUTPATIENT
Start: 2022-12-02

## 2022-12-02 NOTE — TELEPHONE ENCOUNTER
Reason for Call:  Medication or medication refill:    Do you use a [facility]?  Name of the pharmacy and phone number for the current request:  [facility] in the system    Name of the medication requested:precidonse  flexarol  And more please call to the medication    Other request:final request for medication will be moving to [place] full time. Patient would like to have care team call her  Can we leave a detailed message on this number? YES    Phone number patient can be reached at: Work number on file:  There is no work phone number on file.    Best Time: any    Call taken on 12/2/2022 at 8:45 AM by Debra Phelan

## 2022-12-07 NOTE — TELEPHONE ENCOUNTER
Called and spoke to patient. Informed patient of message from Dr. Corona. Patient verbally understand. Scheduled appointment for Tuesday, Dec 13,2022.        Future Appointments    Encounter Information    Provider Department Center   12/13/2022 11:00 AM (Arrive by 10:40 AM) Zach Corona MD [facility] [place], [facility]

## 2022-12-13 DIAGNOSIS — M54.50 CHRONIC BILATERAL LOW BACK PAIN WITHOUT SCIATICA: ICD-10-CM

## 2022-12-13 DIAGNOSIS — R14.2 FLATULENCE, ERUCTATION, AND GAS PAIN: ICD-10-CM

## 2022-12-13 DIAGNOSIS — R14.1 FLATULENCE, ERUCTATION, AND GAS PAIN: ICD-10-CM

## 2022-12-13 DIAGNOSIS — R14.3 FLATULENCE, ERUCTATION, AND GAS PAIN: ICD-10-CM

## 2022-12-13 DIAGNOSIS — F17.200 TOBACCO USE DISORDER: ICD-10-CM

## 2022-12-13 DIAGNOSIS — M79.7 FIBROMYALGIA: ICD-10-CM

## 2022-12-13 DIAGNOSIS — R06.2 WHEEZING: ICD-10-CM

## 2022-12-13 DIAGNOSIS — G89.29 CHRONIC BILATERAL LOW BACK PAIN WITHOUT SCIATICA: ICD-10-CM

## 2022-12-13 DIAGNOSIS — F90.9 ATTENTION DEFICIT HYPERACTIVITY DISORDER (ADHD), UNSPECIFIED ADHD TYPE: ICD-10-CM

## 2022-12-13 DIAGNOSIS — G47.00 INSOMNIA, UNSPECIFIED TYPE: ICD-10-CM

## 2022-12-13 DIAGNOSIS — B00.1 HERPES LABIALIS: ICD-10-CM

## 2022-12-13 RX ORDER — IBUPROFEN 800 MG/1
800 TABLET, FILM COATED ORAL EVERY 8 HOURS PRN
Qty: 90 TABLET | Refills: 0 | Status: SHIPPED | OUTPATIENT
Start: 2022-12-13

## 2022-12-13 RX ORDER — ALBUTEROL SULFATE 90 UG/1
2 AEROSOL, METERED RESPIRATORY (INHALATION) EVERY 6 HOURS PRN
Qty: 18 G | Refills: 1 | Status: SHIPPED | OUTPATIENT
Start: 2022-12-13

## 2022-12-13 RX ORDER — NICOTINE 21 MG/24HR
1 PATCH, TRANSDERMAL 24 HOURS TRANSDERMAL EVERY 24 HOURS
Qty: 28 PATCH | Refills: 1 | Status: SHIPPED | OUTPATIENT
Start: 2022-12-13

## 2022-12-13 RX ORDER — VALACYCLOVIR HYDROCHLORIDE 1 G/1
TABLET, FILM COATED ORAL
Qty: 20 TABLET | Refills: 1 | Status: SHIPPED | OUTPATIENT
Start: 2022-12-13

## 2022-12-13 RX ORDER — TRAZODONE HYDROCHLORIDE 50 MG/1
50 TABLET, FILM COATED ORAL AT BEDTIME
Qty: 90 TABLET | Refills: 0 | Status: SHIPPED | OUTPATIENT
Start: 2022-12-13

## 2022-12-13 RX ORDER — CYCLOBENZAPRINE HCL 10 MG
10 TABLET ORAL 3 TIMES DAILY
Qty: 30 TABLET | Refills: 1 | Status: SHIPPED | OUTPATIENT
Start: 2022-12-13

## 2022-12-13 RX ORDER — DEXTROAMPHETAMINE SACCHARATE, AMPHETAMINE ASPARTATE, DEXTROAMPHETAMINE SULFATE AND AMPHETAMINE SULFATE 5; 5; 5; 5 MG/1; MG/1; MG/1; MG/1
20 TABLET ORAL 2 TIMES DAILY
Qty: 60 TABLET | Refills: 0 | Status: SHIPPED | OUTPATIENT
Start: 2022-12-13

## 2022-12-13 RX ORDER — POLYETHYLENE GLYCOL 3350 17 G/17G
POWDER, FOR SOLUTION ORAL
Qty: 527 G | Refills: 1 | Status: SHIPPED | OUTPATIENT
Start: 2022-12-13

## 2022-12-14 ENCOUNTER — TELEPHONE (OUTPATIENT)
Dept: FAMILY MEDICINE | Facility: CLINIC | Age: 52
End: 2022-12-14

## 2022-12-14 DIAGNOSIS — R06.2 WHEEZING: ICD-10-CM

## 2022-12-14 RX ORDER — ALBUTEROL SULFATE 90 UG/1
2 AEROSOL, METERED RESPIRATORY (INHALATION) EVERY 6 HOURS PRN
Qty: 18 G | Refills: 1 | Status: SHIPPED | OUTPATIENT
Start: 2022-12-14

## 2022-12-14 NOTE — TELEPHONE ENCOUNTER
Dr. Corona,     Spoke with pt, per pt ok with alternative inhaler.   Pharm cued.     Thanks,  ALISIA Valentin  [facility]

## 2022-12-14 NOTE — TELEPHONE ENCOUNTER
albuterol (PROAIR HFA/PROVENTIL HFA/VENTOLIN HFA) 108 (90 Base) MCG/ACT inhaler 18 g 1 12/13/2022     Plan does not cover this medication. Please call store at [phone] or fax [phone] to change medication to PROAIR HFA and VENTOLIN or call plan at [phone] to initiate PA. Patient ID # is [id number].

## 2024-02-15 NOTE — PROGRESS NOTES
SUBJECTIVE:   CC: Marlin Lopez is an 49 year old woman who presents for preventive health visit.     Healthy Habits:    Do you get at least three servings of calcium containing foods daily (dairy, green leafy vegetables, etc.)? yes    Amount of exercise or daily activities, outside of work: 2 day(s) per week. Walks, weights,     Problems taking medications regularly No    Medication side effects: No    Have you had an eye exam in the past two years? yes    Do you see a dentist twice per year? yes    Do you have sleep apnea, excessive snoring or daytime drowsiness?no    -------------------------------------    Today's PHQ-2 Score:   PHQ-2 ( 1999 [employer]) 6/11/2019 5/11/2018   Q1: Little interest or pleasure in doing things 0 1   Q2: Feeling down, depressed or hopeless 0 1   PHQ-2 Score 0 2     Other Concerns:  1. ADHD  2. Vaginitis  3, Chronic pain/fatigue  4. Recurrent cold sores  Abuse: Current or Past(Physical, Sexual or Emotional)- No  Do you feel safe in your environment? yes    Social History     Tobacco Use     Smoking status: Former Smoker     Packs/day: 0.50     Types: Cigarettes     Start date: 1/1/1980     Last attempt to quit: 9/20/2015     Years since quitting: 3.7     Smokeless tobacco: Never Used     Tobacco comment:  using e-cig daily   Substance Use Topics     Alcohol use: Yes     Alcohol/week: 0.0 oz     Comment:  drinks 3 days in a week, 2 beers each time      Started smoking a few weeks ago; 0.5 PPD not liking it. Will start ecig    If you drink alcohol do you typically have >3 drinks per day or >7 drinks per week?no                        Reviewed orders with patient.  Reviewed health maintenance and updated orders accordingly - Yes  Patient Active Problem List   Diagnosis     Anxiety state     Fibromyalgia     Sleep problems     Recurrent cold sores     Moderate major depression (H)     ADHD (attention deficit hyperactivity disorder)     CARDIOVASCULAR SCREENING; LDL GOAL LESS THAN 130      Family history of colon cancer     Allergic rhinitis     Renal cyst, left     Chronic constipation     Diverticulosis of large intestine without hemorrhage     Ulceration of colon determined by endoscopy     Past Surgical History:   Procedure Laterality Date     C REMOVAL OF OVARY(S)  1992    Left, cyst     TONSILLECTOMY  1979       Social History     Tobacco Use     Smoking status: Former Smoker     Packs/day: 0.50     Types: Cigarettes     Start date: 1/1/1980     Last attempt to quit: 9/20/2015     Years since quitting: 3.7     Smokeless tobacco: Never Used     Tobacco comment:  using e-cig daily   Substance Use Topics     Alcohol use: Yes     Alcohol/week: 0.0 oz     Comment:  drinks 3 days in a week, 2 beers each time      Family History   Problem Relation Age of Onset     Hypertension Maternal Grandmother      Cerebrovascular Disease Maternal Grandmother      Alzheimer Disease Maternal Grandmother      Arthritis Maternal Grandmother      Obesity Maternal Grandmother      Cancer Other         Colon     Breast Cancer Other      Cancer - colorectal Paternal Grandfather            Weight Abnormality:  Trying to loss weight but not working.      Wt Readings from Last 10 Encounters:   06/11/19 77.8 kg (171 lb 8 oz)   03/15/19 76.6 kg (168 lb 12.8 oz)   11/16/18 76.1 kg (167 lb 12.8 oz)   10/25/18 77.5 kg (170 lb 12.8 oz)   09/26/18 77.3 kg (170 lb 8 oz)   07/31/18 74.8 kg (165 lb)   06/18/18 81 kg (178 lb 9.6 oz)   05/11/18 77.3 kg (170 lb 8 oz)   04/06/18 76.7 kg (169 lb)   02/06/18 76.2 kg (168 lb)     Mammogram not appropriate for this patient based on age.  Mammogram Screening: Patient over age 50, mutual decision to screen reflected in health maintenance.    Pertinent mammograms are reviewed under the imaging tab.  History of abnormal Pap smear: NO - age 30-65 PAP every 5 years with negative HPV co-testing recommended  PAP / HPV Latest Ref Rng & Units 4/6/2018 8/4/2015 2/22/2012   PAP - NIL NIL NIL   HPV 16  "DNA NEG:Negative Negative - -   HPV 18 DNA NEG:Negative Negative - -   OTHER HR HPV NEG:Negative Negative - -     Tobacco  Allergies  Meds  Med Hx  Surg Hx  Fam Hx  Soc Hx      Reviewed and updated as needed this visit by Provider    ROS:  CONSTITUTIONAL: NEGATIVE for fever, chills, change in weight  INTEGUMENTARY/SKIN: NEGATIVE for worrisome rashes, moles or lesions  EYES: NEGATIVE for vision changes or irritation  ENT: NEGATIVE for ear, mouth and throat problems  RESP: NEGATIVE for significant cough or SOB  BREAST: NEGATIVE for masses, tenderness or discharge  CV: NEGATIVE for chest pain, palpitations or peripheral edema  GI: NEGATIVE for nausea, abdominal pain, heartburn, or change in bowel habits  : NEGATIVE for unusual urinary or vaginal symptoms. No vaginal bleeding.  MUSCULOSKELETAL: POSITIVE for significant arthralgias or myalgia  NEURO: POSITIVE for weakness, dizziness or paresthesias  PSYCHIATRIC: POSITIVE for changes in mood or affect     OBJECTIVE:   /90   Pulse 91   Temp 97.3  F (36.3  C) (Oral)   Resp 16   Ht 1.775 m (5' 9.88\")   Wt 77.8 kg (171 lb 8 oz)   LMP 01/03/2014   SpO2 100%   BMI 24.69 kg/m    EXAM:  GENERAL APPEARANCE: healthy, alert and no distress  EYES: Eyes grossly normal to inspection, PERRL and conjunctivae and sclerae normal  HENT: ear canals and TM's normal, nose and mouth without ulcers or lesions, oropharynx clear and oral mucous membranes moist  NECK: no adenopathy and thyroid normal to palpation  RESP: lungs clear to auscultation - no rales, rhonchi or wheezes  BREAST: normal without masses, tenderness or nipple discharge and no palpable axillary masses or adenopathy  CV: regular rate and rhythm, normal S1 S2, no S3 or S4, no murmur, click or rub, no peripheral edema  ABDOMEN: soft, nontender, no masses and bowel sounds normal  MS: no musculoskeletal defects are noted and gait is age appropriate without ataxia  SKIN: no suspicious lesions or rashes  NEURO: " Hospitalist Normal strength and tone, sensory exam grossly normal, mentation intact and speech normal  PSYCH: mentation appears normal and affect normal/bright    Diagnostic Test Results:  Results for orders placed or performed in visit on 06/11/19   Lipid Profile (Chol, Trig, HDL, LDL calc)   Result Value Ref Range    Cholesterol 186 <200 mg/dL    Triglycerides 41 <150 mg/dL    HDL Cholesterol 73 >49 mg/dL    LDL Cholesterol Calculated 105 (H) <100 mg/dL    Non HDL Cholesterol 113 <130 mg/dL   TSH with free T4 reflex   Result Value Ref Range    TSH 0.87 0.40 - 4.00 mU/L   Glucose   Result Value Ref Range    Glucose 83 70 - 99 mg/dL   UA reflex to Microscopic and Culture   Result Value Ref Range    Color Urine Yellow     Appearance Urine Clear     Glucose Urine Negative NEG^Negative mg/dL    Bilirubin Urine Negative NEG^Negative    Ketones Urine Negative NEG^Negative mg/dL    Specific Gravity Urine <=1.005 1.003 - 1.035    Blood Urine Trace (A) NEG^Negative    pH Urine 5.5 5.0 - 7.0 pH    Protein Albumin Urine Negative NEG^Negative mg/dL    Urobilinogen Urine 0.2 0.2 - 1.0 EU/dL    Nitrite Urine Negative NEG^Negative    Leukocyte Esterase Urine Large (A) NEG^Negative    Source Midstream Urine    Urine Microscopic   Result Value Ref Range    WBC Urine 5-10 (A) OTO5^0 - 5 /HPF    RBC Urine 2-5 (A) OTO2^O - 2 /HPF    Squamous Epithelial /LPF Urine Few FEW^Few /LPF   Wet prep   Result Value Ref Range    Specimen Description Vagina     Wet Prep No Trichomonas seen     Wet Prep No clue cells seen     Wet Prep No yeast seen     Wet Prep WBC'S seen     Wet Prep Few    Urine Culture Aerobic Bacterial   Result Value Ref Range    Specimen Description Midstream Urine     Culture Micro Culture in progress     Culture Micro (A)      <10,000 colonies/mL  Streptococcus agalactiae sero group B  Beta Hemolytic Streptococcus groups A and B are susceptible to ampicillin, penicillin,   vancomycin, and the cephalosporins.  Susceptibility testing is  "not routinely done on these   organisms isolated from urine.         ASSESSMENT/PLAN:   Marlin was seen today for physical.    Diagnoses and all orders for this visit:    Routine history and physical examination of adult  -     MA SCREENING DIGITAL BILAT - Future  (s+30); Future  -     Lipid Profile (Chol, Trig, HDL, LDL calc)  -     TSH with free T4 reflex  -     Glucose  -     Urine Microscopic    Recurrent cold sores  -     acyclovir (ZOVIRAX) 5 % external ointment; Apply topically 6 times daily    Major depressive disorder in partial remission, unspecified whether recurrent (H)        -    Following with mental health    Screen for STD (sexually transmitted disease)  -     Wet prep  -     UA reflex to Microscopic and Culture    Attention deficit hyperactivity disorder (ADHD), predominantly inattentive type  -     amphetamine-dextroamphetamine (ADDERALL) 20 MG tablet; Take 1 tablet (20 mg) by mouth 2 times daily  -     amphetamine-dextroamphetamine (ADDERALL) 20 MG tablet; Take 1 tablet (20 mg) by mouth 2 times daily  -     amphetamine-dextroamphetamine (ADDERALL) 20 MG tablet; Take 1 tablet (20 mg) by mouth 2 times daily    Nonspecific finding on examination of urine  -     Urine Culture Aerobic Bacterial      COUNSELING:   Reviewed preventive health counseling, as reflected in patient instructions       Regular exercise       Healthy diet/nutrition       (Josee)menopause management    Estimated body mass index is 24.69 kg/m  as calculated from the following:    Height as of this encounter: 1.775 m (5' 9.88\").    Weight as of this encounter: 77.8 kg (171 lb 8 oz).     reports that she quit smoking about 3 years ago. Her smoking use included cigarettes. She started smoking about 39 years ago. She smoked 0.50 packs per day. She has never used smokeless tobacco.      Counseling Resources:  ATP IV Guidelines  Pooled Cohorts Equation Calculator  Breast Cancer Risk Calculator  FRAX Risk Assessment  ICSI Preventive " Guidelines  Dietary Guidelines for Americans, 2010  USDA's MyPlate  ASA Prophylaxis  Lung CA Screening    Zach Corona MD  [facility]

## 2024-12-03 ENCOUNTER — VIRTUAL VISIT (OUTPATIENT)
Dept: FAMILY MEDICINE | Facility: CLINIC | Age: 54
End: 2024-12-03
Payer: MEDICAID

## 2024-12-03 DIAGNOSIS — Z76.89 ENCOUNTER TO ESTABLISH CARE: Primary | ICD-10-CM

## 2024-12-03 DIAGNOSIS — G47.00 INSOMNIA, UNSPECIFIED TYPE: ICD-10-CM

## 2024-12-03 DIAGNOSIS — G89.29 CHRONIC BILATERAL LOW BACK PAIN WITHOUT SCIATICA: ICD-10-CM

## 2024-12-03 DIAGNOSIS — R06.2 WHEEZING: ICD-10-CM

## 2024-12-03 DIAGNOSIS — M54.50 CHRONIC BILATERAL LOW BACK PAIN WITHOUT SCIATICA: ICD-10-CM

## 2024-12-03 DIAGNOSIS — F33.1 MAJOR DEPRESSIVE DISORDER, RECURRENT EPISODE, MODERATE (H): ICD-10-CM

## 2024-12-03 DIAGNOSIS — M21.612 BUNION, LEFT: ICD-10-CM

## 2024-12-03 DIAGNOSIS — F90.0 ATTENTION DEFICIT HYPERACTIVITY DISORDER (ADHD), PREDOMINANTLY INATTENTIVE TYPE: ICD-10-CM

## 2024-12-03 DIAGNOSIS — M51.362 DEGENERATION OF INTERVERTEBRAL DISC OF LUMBAR REGION WITH DISCOGENIC BACK PAIN AND LOWER EXTREMITY PAIN: ICD-10-CM

## 2024-12-03 DIAGNOSIS — K21.00 GASTROESOPHAGEAL REFLUX DISEASE WITH ESOPHAGITIS WITHOUT HEMORRHAGE: ICD-10-CM

## 2024-12-03 DIAGNOSIS — M50.30 DDD (DEGENERATIVE DISC DISEASE), CERVICAL: ICD-10-CM

## 2024-12-03 DIAGNOSIS — M79.7 FIBROMYALGIA: ICD-10-CM

## 2024-12-03 DIAGNOSIS — B00.1 HERPES LABIALIS: ICD-10-CM

## 2024-12-03 PROCEDURE — 99204 OFFICE O/P NEW MOD 45 MIN: CPT | Mod: 95 | Performed by: FAMILY MEDICINE

## 2024-12-03 RX ORDER — ALPRAZOLAM 0.5 MG
0.5 TABLET ORAL 3 TIMES DAILY PRN
COMMUNITY
End: 2024-12-04

## 2024-12-03 ASSESSMENT — PATIENT HEALTH QUESTIONNAIRE - PHQ9: SUM OF ALL RESPONSES TO PHQ QUESTIONS 1-9: 15

## 2024-12-03 NOTE — PROGRESS NOTES
Marlin is a 54 year old who is being evaluated via a billable video visit.    How would you like to obtain your AVS? Mail a copy  If the video visit is dropped, the invitation should be resent by: Text to cell phone: [phone]  Will anyone else be joining your video visit? No      Assessment & Plan     Encounter to establish care   Patient had moved to [place] and is now back and now is back; been following with two health systems; [facility] available in [facility] and [facility] in [place] not available, she will ask them to send us records.    Major depressive disorder, recurrent episode, moderate (H)   Having on going issues with depression, worsened by current circumstances; says she is going through a divorce. Discussed speaking with therapist  - Adult Mental Health  Referral    Fibromyalgia   Chronic issue; does NSAIDs and muscle relaxers, compounded by mental health issues  - cyclobenzaprine (FLEXERIL) 10 MG tablet  Dispense: 30 tablet; Refill: 1    Attention deficit hyperactivity disorder (ADHD), predominantly inattentive type   Reviewed chart from previous provider; refill given  - amphetamine-dextroamphetamine (ADDERALL) 20 MG tablet  Dispense: 60 tablet; Refill: 0    DDD (degenerative disc disease), cervical   Asking for imaging of left side of body; needs to get records from [place] to see what has been done then will consider imaging versus referral  - cyclobenzaprine (FLEXERIL) 10 MG tablet  Dispense: 30 tablet; Refill: 1    Degeneration of intervertebral disc of lumbar region with discogenic back pain and lower extremity pain  - cyclobenzaprine (FLEXERIL) 10 MG tablet  Dispense: 30 tablet; Refill: 1    Chronic bilateral low back pain without sciatica   Also been taking NSAIDs, previously ibuprofen, then Naproxen  - cyclobenzaprine (FLEXERIL) 10 MG tablet  Dispense: 30 tablet; Refill: 1    Bunion, left (great toe)   Not able to examine, but will refer her to podiatry  - Orthopedic   Referral    Wheezing  - albuterol (PROAIR HFA/PROVENTIL HFA/VENTOLIN HFA) 108 (90 Base) MCG/ACT inhaler  Dispense: 18 g; Refill: 1    Insomnia, unspecified type  - traZODone (DESYREL) 50 MG tablet  Dispense: 90 tablet; Refill: 0    Herpes labialis  - valACYclovir (VALTREX) 1000 mg tablet  Dispense: 10 tablet; Refill: 2    Gastroesophageal reflux disease with esophagitis without hemorrhage  - pantoprazole (PROTONIX) 40 MG EC tablet  Dispense: 90 tablet; Refill: 0        See Patient Instructions    Nam Isaac is a 54 year old, presenting for the following health issues:  Recheck Medication   Last office visit: 10/08/2021 refills till 12/14/22   Been following with [facility] in [place]          12/3/2024    12:49 PM   Additional Questions   Roomed by jjaa Crisostomo ma   Accompanied by self - video visit     Refill on all medications     HPI     Patient back in [place]  Says going through a hard time; been okay.  Going through a divorce  Been seeing   Whole left side of body from neck to foot; also has bunion in the left foot   Neck; left arm gets    Back Pain   Hip Pain   Bunion Left foot    Autoimmune diseases:    Besides Fibromyalgia    Referrals:    Mental health        Objective    Vitals - Patient Reported  Pain Score: Extreme Pain (9)      Vitals:  No vitals were obtained today due to virtual visit.    Physical Exam         Video-Visit Details    Type of service:  Video Visit   Originating Location (pt. Location): Home  Distant Location (provider location):  On-site  Platform used for Video Visit: Stefany      Signed Electronically by: Zach Corona MD

## 2024-12-04 RX ORDER — PANTOPRAZOLE SODIUM 40 MG/1
40 TABLET, DELAYED RELEASE ORAL DAILY
Qty: 90 TABLET | Refills: 0 | Status: SHIPPED | OUTPATIENT
Start: 2024-12-04

## 2024-12-04 RX ORDER — CYCLOBENZAPRINE HCL 10 MG
10 TABLET ORAL 3 TIMES DAILY PRN
Qty: 30 TABLET | Refills: 1 | Status: SHIPPED | OUTPATIENT
Start: 2024-12-04

## 2024-12-04 RX ORDER — DEXTROAMPHETAMINE SACCHARATE, AMPHETAMINE ASPARTATE, DEXTROAMPHETAMINE SULFATE AND AMPHETAMINE SULFATE 5; 5; 5; 5 MG/1; MG/1; MG/1; MG/1
20 TABLET ORAL 2 TIMES DAILY
Qty: 60 TABLET | Refills: 0 | Status: SHIPPED | OUTPATIENT
Start: 2024-12-04

## 2024-12-04 RX ORDER — TRAZODONE HYDROCHLORIDE 50 MG/1
50 TABLET, FILM COATED ORAL AT BEDTIME
Qty: 90 TABLET | Refills: 0 | Status: SHIPPED | OUTPATIENT
Start: 2024-12-04

## 2024-12-04 RX ORDER — VALACYCLOVIR HYDROCHLORIDE 1 G/1
TABLET, FILM COATED ORAL
Qty: 10 TABLET | Refills: 2 | Status: SHIPPED | OUTPATIENT
Start: 2024-12-04

## 2024-12-04 RX ORDER — ALBUTEROL SULFATE 90 UG/1
2 INHALANT RESPIRATORY (INHALATION) EVERY 6 HOURS PRN
Qty: 18 G | Refills: 1 | Status: SHIPPED | OUTPATIENT
Start: 2024-12-04

## 2024-12-05 ENCOUNTER — PATIENT OUTREACH (OUTPATIENT)
Dept: CARE COORDINATION | Facility: CLINIC | Age: 54
End: 2024-12-05
Payer: MEDICAID

## 2024-12-09 ENCOUNTER — PATIENT OUTREACH (OUTPATIENT)
Dept: CARE COORDINATION | Facility: CLINIC | Age: 54
End: 2024-12-09
Payer: MEDICAID

## 2024-12-10 ENCOUNTER — NURSE TRIAGE (OUTPATIENT)
Dept: FAMILY MEDICINE | Facility: CLINIC | Age: 54
End: 2024-12-10
Payer: MEDICAID

## 2024-12-10 NOTE — TELEPHONE ENCOUNTER
"Spoke with patient. Patient has been experiencing sharp pain in her ribcage on the left side. She describes that there are \"little balls\" she can feel in the area. She has been experiencing this pain for the past 5 days.    Patient explains that the pain does not radiate anywhere else. She describes that it is a constant pain. She rates pain as a 10/10.     Patient has a cold currently, and cough. She describes it seems like her pain worsens when she coughs, however, unsure if related.     She also describes that she was intending to also have an office visit to discuss other concerns, such as feeling like she has something stuck in her throat which has been going on for a while. She also has hip pain she would like to discuss.     Advised to patient since she is experiencing severe rib pain, she should be seen in ED immediately. She declined, stating she should be able to be seen in clinic for this concern. Next available appointment with PCP is on 12/16, patient requesting message to be sent to request to be fit in sooner if possible. Patient also open to seeing other providers in [facility] if appropriate.     Routing to provider to please advise.    KYMEBRLY Peres RN  [facility]    Reason for Disposition   SEVERE chest pain    Additional Information   Negative: Followed an injury to chest   Negative: SEVERE difficulty breathing (e.g., struggling for each breath, speaks in single words)   Negative: Difficult to awaken or acting confused (e.g., disoriented, slurred speech)   Negative: Shock suspected (e.g., cold/pale/clammy skin, too weak to stand, low BP, rapid pulse)   Negative: Passed out (i.e., lost consciousness, collapsed and was not responding)   Negative: Chest pain lasting longer than 5 minutes and ANY of the following:         Pain is crushing, pressure-like, or heavy         Over 44 years old          Over 30 years old and one cardiac risk factor (e.g diabetes, high blood pressure, " high cholesterol, smoker, or family history of heart disease)         History of heart disease (e.g. angina, heart attack, heart failure, bypass surgery, takes nitroglycerin)   Negative: Heart beating < 50 beats per minute OR > 140 beats per minute   Negative: Visible sweat on face or sweat dripping down face   Negative: Sounds like a life-threatening emergency to the triager    Protocols used: Chest Pain-A-[place]

## 2024-12-10 NOTE — TELEPHONE ENCOUNTER
Provider Response to 2nd Level Triage Request    I have reviewed the RN documentation. My recommendation is:  Be seen in clinic within 3 days if worsen go to ER, schedule with any other provider before 12/16/24 if opening  available

## 2024-12-17 ENCOUNTER — TELEPHONE (OUTPATIENT)
Dept: FAMILY MEDICINE | Facility: CLINIC | Age: 54
End: 2024-12-17
Payer: MEDICAID

## 2024-12-17 NOTE — TELEPHONE ENCOUNTER
Reason for Call:  Appointment Request    Patient requesting this type of appt:  Hospital/ED Follow-Up     Requested provider: Zach Corona    Reason patient unable to be scheduled: Not with their preferred provider    When does patient want to be seen/preferred time: ASAP     Comments:Pt was seen in the ER and symptoms are not improving. PT discussed symptoms with triage and was told to schedule an appt and would like to be seen asap or looking for a prescription to help out with symptoms- persist ant cough and chest pains. Pt is also requesting an MRI.      Okay to leave a detailed message?: Yes at Home number on file [phone] (home) or Cell number on file:    Telephone Information:   Mobile [phone]       Call taken on 12/17/2024 at 12:58 PM by Lucy Miller

## 2024-12-17 NOTE — TELEPHONE ENCOUNTER
Contacts       Contact Date/Time Type Contact Phone/Fax    12/17/2024 12:58 PM CST Phone (Incoming) Marlin Lopez (Self) [phone] (M)    12/17/2024 02:52 PM CST Phone (Outgoing) Marlin Lopez (Self) [phone] (M)    Left Message           Called and left a message for Marlin.    Provider is booked until he will be out of office 12/21/24 - 6/5/2025    Please help her get scheduled with another provider. Vani Mathias,

## 2024-12-30 ENCOUNTER — TELEPHONE (OUTPATIENT)
Dept: FAMILY MEDICINE | Facility: CLINIC | Age: 54
End: 2024-12-30
Payer: MEDICAID

## 2024-12-30 NOTE — TELEPHONE ENCOUNTER
Patient calling, says she is having severe ongoing pain under her left breast, see 12/12/24 ER visit.   EKG normal.    She is hoping a provider would order a diagnostic mammogram for the left breast pain.    She is scheduled to see PCP Dr. Corona on 1/6, wonders if Dr. Tyler is available sooner?    She is in [place], recently relocated from [place].  Wants to do this by video.    Scheduled 1/2/25 with Dr. Tyler video visit.    Kate SANTANA RN  [facility] Triage

## 2025-01-01 NOTE — PROGRESS NOTES
12/12/24  IMPRESSION and PLAN:  Marlin LAN is a 54 y.o. with medical history notable for fibromyalgia, depression presenting for evaluation of upper abdominal pain, cough. Differential considered but not limited to muscle strain, rib fracture, pneumothorax, gastritis, PUD, splenic infarct, hepatitis, cholecystitis, renal colic, UTI.    I reviewed nursing notes and patient's vitals on arrival which are unremarkable. On examination, she is sitting upright, nontoxic. Her lungs are clear bilaterally. She has mild left upper quadrant tenderness to palpation. No flank pain or CVA tenderness.     I reviewed her lab testing which showed no leukocytosis, normal hemoglobin. LFTs, lipase are unremarkable. BMP did show a glucose of 61 as she is not eating or had much to drink all day. No other metabolic abnormality is noted. UA is negative for infection.    I did review her EKG which did not show ischemic current of injury or malignant dysrhythmia. Her troponin was collected given her general area of pain. This is noted to be 11. She reports that she had a stress test completed in [place] 3 months ago that this was negative. I think her pain is most rate related to ongoing cough. I offered additional testing including repeat troponin testing here to ensure her troponin was stable but she was tired to watch her symptoms and returning with new or changing symptoms. Certainly her symptoms appear most consistent with pain secondary to ongoing cough.    Given possibility of pertussis, I will treat her with azithromycin as well as Tessalon capsules.

## 2025-01-06 ENCOUNTER — OFFICE VISIT (OUTPATIENT)
Dept: FAMILY MEDICINE | Facility: CLINIC | Age: 55
End: 2025-01-06
Payer: MEDICAID

## 2025-01-06 VITALS
TEMPERATURE: 98.5 F | BODY MASS INDEX: 24.31 KG/M2 | SYSTOLIC BLOOD PRESSURE: 136 MMHG | WEIGHT: 169.8 LBS | HEIGHT: 70 IN | OXYGEN SATURATION: 98 % | DIASTOLIC BLOOD PRESSURE: 88 MMHG | RESPIRATION RATE: 19 BRPM | HEART RATE: 72 BPM

## 2025-01-06 DIAGNOSIS — H57.9 ITCHY EYES: ICD-10-CM

## 2025-01-06 DIAGNOSIS — D72.829 LEUKOCYTOSIS, UNSPECIFIED TYPE: ICD-10-CM

## 2025-01-06 DIAGNOSIS — M50.30 DDD (DEGENERATIVE DISC DISEASE), CERVICAL: ICD-10-CM

## 2025-01-06 DIAGNOSIS — Z12.31 VISIT FOR SCREENING MAMMOGRAM: ICD-10-CM

## 2025-01-06 DIAGNOSIS — F90.0 ATTENTION DEFICIT HYPERACTIVITY DISORDER (ADHD), PREDOMINANTLY INATTENTIVE TYPE: ICD-10-CM

## 2025-01-06 DIAGNOSIS — R68.89 THROAT CONGESTION: ICD-10-CM

## 2025-01-06 DIAGNOSIS — B37.31 YEAST INFECTION OF THE VAGINA: ICD-10-CM

## 2025-01-06 DIAGNOSIS — Z78.9 ADVISED ABOUT MANAGEMENT OF WEIGHT: ICD-10-CM

## 2025-01-06 DIAGNOSIS — R14.1 FLATULENCE, ERUCTATION, AND GAS PAIN: ICD-10-CM

## 2025-01-06 DIAGNOSIS — E78.5 HYPERLIPIDEMIA LDL GOAL <100: ICD-10-CM

## 2025-01-06 DIAGNOSIS — M54.50 CHRONIC BILATERAL LOW BACK PAIN WITHOUT SCIATICA: ICD-10-CM

## 2025-01-06 DIAGNOSIS — Z12.31 ENCOUNTER FOR SCREENING MAMMOGRAM FOR BREAST CANCER: ICD-10-CM

## 2025-01-06 DIAGNOSIS — M79.7 FIBROMYALGIA: ICD-10-CM

## 2025-01-06 DIAGNOSIS — R76.8 ELEVATED ANTINUCLEAR ANTIBODY (ANA) LEVEL: ICD-10-CM

## 2025-01-06 DIAGNOSIS — R06.2 WHEEZING: ICD-10-CM

## 2025-01-06 DIAGNOSIS — R14.2 FLATULENCE, ERUCTATION, AND GAS PAIN: ICD-10-CM

## 2025-01-06 DIAGNOSIS — J44.9 CHRONIC OBSTRUCTIVE PULMONARY DISEASE, UNSPECIFIED COPD TYPE (H): ICD-10-CM

## 2025-01-06 DIAGNOSIS — M51.362 DEGENERATION OF INTERVERTEBRAL DISC OF LUMBAR REGION WITH DISCOGENIC BACK PAIN AND LOWER EXTREMITY PAIN: ICD-10-CM

## 2025-01-06 DIAGNOSIS — R10.9 FLANK PAIN: ICD-10-CM

## 2025-01-06 DIAGNOSIS — Z09 FOLLOW-UP EXAM AFTER TREATMENT: Primary | ICD-10-CM

## 2025-01-06 DIAGNOSIS — R10.10 PAIN OF UPPER ABDOMEN: ICD-10-CM

## 2025-01-06 DIAGNOSIS — M94.0 COSTOCHONDRITIS: ICD-10-CM

## 2025-01-06 DIAGNOSIS — F17.200 TOBACCO USE DISORDER: ICD-10-CM

## 2025-01-06 DIAGNOSIS — R14.3 FLATULENCE, ERUCTATION, AND GAS PAIN: ICD-10-CM

## 2025-01-06 DIAGNOSIS — G89.29 CHRONIC BILATERAL LOW BACK PAIN WITHOUT SCIATICA: ICD-10-CM

## 2025-01-06 LAB
ANION GAP SERPL CALCULATED.3IONS-SCNC: 11 MMOL/L (ref 7–15)
BASOPHILS # BLD AUTO: 0 10E3/UL (ref 0–0.2)
BASOPHILS NFR BLD AUTO: 1 %
BUN SERPL-MCNC: 11.3 MG/DL (ref 6–20)
CALCIUM SERPL-MCNC: 9.6 MG/DL (ref 8.8–10.4)
CHLORIDE SERPL-SCNC: 106 MMOL/L (ref 98–107)
CHOLEST SERPL-MCNC: 190 MG/DL
CLUE CELLS: ABNORMAL
CREAT SERPL-MCNC: 0.74 MG/DL (ref 0.51–0.95)
EGFRCR SERPLBLD CKD-EPI 2021: >90 ML/MIN/1.73M2
EOSINOPHIL # BLD AUTO: 0.2 10E3/UL (ref 0–0.7)
EOSINOPHIL NFR BLD AUTO: 3 %
ERYTHROCYTE [DISTWIDTH] IN BLOOD BY AUTOMATED COUNT: 12.1 % (ref 10–15)
FASTING STATUS PATIENT QL REPORTED: YES
FASTING STATUS PATIENT QL REPORTED: YES
GLUCOSE SERPL-MCNC: 95 MG/DL (ref 70–99)
HCO3 SERPL-SCNC: 23 MMOL/L (ref 22–29)
HCT VFR BLD AUTO: 37.9 % (ref 35–47)
HDLC SERPL-MCNC: 75 MG/DL
HGB BLD-MCNC: 13.2 G/DL (ref 11.7–15.7)
IMM GRANULOCYTES # BLD: 0 10E3/UL
IMM GRANULOCYTES NFR BLD: 0 %
LDLC SERPL CALC-MCNC: 104 MG/DL
LYMPHOCYTES # BLD AUTO: 1.4 10E3/UL (ref 0.8–5.3)
LYMPHOCYTES NFR BLD AUTO: 22 %
MCH RBC QN AUTO: 31.4 PG (ref 26.5–33)
MCHC RBC AUTO-ENTMCNC: 34.8 G/DL (ref 31.5–36.5)
MCV RBC AUTO: 90 FL (ref 78–100)
MONOCYTES # BLD AUTO: 0.4 10E3/UL (ref 0–1.3)
MONOCYTES NFR BLD AUTO: 6 %
NEUTROPHILS # BLD AUTO: 4.3 10E3/UL (ref 1.6–8.3)
NEUTROPHILS NFR BLD AUTO: 68 %
NONHDLC SERPL-MCNC: 115 MG/DL
PLATELET # BLD AUTO: 319 10E3/UL (ref 150–450)
POTASSIUM SERPL-SCNC: 4.1 MMOL/L (ref 3.4–5.3)
RBC # BLD AUTO: 4.21 10E6/UL (ref 3.8–5.2)
SODIUM SERPL-SCNC: 140 MMOL/L (ref 135–145)
TRICHOMONAS, WET PREP: ABNORMAL
TRIGL SERPL-MCNC: 56 MG/DL
WBC # BLD AUTO: 6.4 10E3/UL (ref 4–11)
WBC'S/HIGH POWER FIELD, WET PREP: ABNORMAL
YEAST, WET PREP: PRESENT

## 2025-01-06 PROCEDURE — 80048 BASIC METABOLIC PNL TOTAL CA: CPT | Performed by: FAMILY MEDICINE

## 2025-01-06 PROCEDURE — 80061 LIPID PANEL: CPT | Performed by: FAMILY MEDICINE

## 2025-01-06 PROCEDURE — 36415 COLL VENOUS BLD VENIPUNCTURE: CPT | Performed by: FAMILY MEDICINE

## 2025-01-06 PROCEDURE — 87210 SMEAR WET MOUNT SALINE/INK: CPT | Performed by: FAMILY MEDICINE

## 2025-01-06 PROCEDURE — 85025 COMPLETE CBC W/AUTO DIFF WBC: CPT | Performed by: FAMILY MEDICINE

## 2025-01-06 RX ORDER — DEXTROAMPHETAMINE SACCHARATE, AMPHETAMINE ASPARTATE, DEXTROAMPHETAMINE SULFATE AND AMPHETAMINE SULFATE 5; 5; 5; 5 MG/1; MG/1; MG/1; MG/1
20 TABLET ORAL 2 TIMES DAILY
Qty: 60 TABLET | Refills: 0 | Status: CANCELLED | OUTPATIENT
Start: 2025-01-06

## 2025-01-06 RX ORDER — TIRZEPATIDE 2.5 MG/.5ML
2.5 INJECTION, SOLUTION SUBCUTANEOUS
Qty: 2 ML | Refills: 1 | Status: CANCELLED | OUTPATIENT
Start: 2025-01-06

## 2025-01-06 RX ORDER — KETOTIFEN FUMARATE 0.35 MG/ML
SOLUTION/ DROPS OPHTHALMIC
Status: CANCELLED | OUTPATIENT
Start: 2025-01-06

## 2025-01-06 ASSESSMENT — ANXIETY QUESTIONNAIRES
4. TROUBLE RELAXING: NOT AT ALL
3. WORRYING TOO MUCH ABOUT DIFFERENT THINGS: SEVERAL DAYS
GAD7 TOTAL SCORE: 5
7. FEELING AFRAID AS IF SOMETHING AWFUL MIGHT HAPPEN: SEVERAL DAYS
5. BEING SO RESTLESS THAT IT IS HARD TO SIT STILL: NOT AT ALL
IF YOU CHECKED OFF ANY PROBLEMS ON THIS QUESTIONNAIRE, HOW DIFFICULT HAVE THESE PROBLEMS MADE IT FOR YOU TO DO YOUR WORK, TAKE CARE OF THINGS AT HOME, OR GET ALONG WITH OTHER PEOPLE: SOMEWHAT DIFFICULT
8. IF YOU CHECKED OFF ANY PROBLEMS, HOW DIFFICULT HAVE THESE MADE IT FOR YOU TO DO YOUR WORK, TAKE CARE OF THINGS AT HOME, OR GET ALONG WITH OTHER PEOPLE?: SOMEWHAT DIFFICULT
GAD7 TOTAL SCORE: 5
GAD7 TOTAL SCORE: 5
2. NOT BEING ABLE TO STOP OR CONTROL WORRYING: SEVERAL DAYS
7. FEELING AFRAID AS IF SOMETHING AWFUL MIGHT HAPPEN: SEVERAL DAYS
1. FEELING NERVOUS, ANXIOUS, OR ON EDGE: SEVERAL DAYS
6. BECOMING EASILY ANNOYED OR IRRITABLE: SEVERAL DAYS

## 2025-01-06 ASSESSMENT — PATIENT HEALTH QUESTIONNAIRE - PHQ9: SUM OF ALL RESPONSES TO PHQ QUESTIONS 1-9: 7

## 2025-01-06 ASSESSMENT — PAIN SCALES - GENERAL: PAINLEVEL_OUTOF10: EXTREME PAIN (8)

## 2025-01-06 NOTE — PROGRESS NOTES
Assessment & Plan     Follow-up exam after treatment  Patient was seen in the ER for upper abdominal pain; describes it as left lower subcostal pain.  Reviewed ER records and imaging.  No significant findings.  Pain is in the rib area; along the ribs at the been going on for a long time possibly costochondritis    Pain of upper abdomen  Left upper abdomen/lower subcostal.  Concerned about UTI will check UA  - UA Macroscopic with reflex to Microscopic and Culture - Lab Collect  - Basic metabolic panel  (Ca, Cl, CO2, Creat, Gluc, K, Na, BUN)  - Basic metabolic panel  (Ca, Cl, CO2, Creat, Gluc, K, Na, BUN)    Costochondritis  The pain in the left upper abdomen/left subcostal seems consistent with costochondritis; discussed pathophysiology and management with anti-inflammatory as needed    Flank pain  Concerned about UTI or some yeast infection.  - Wet prep - Clinic Collect    Advised about management of weight  Patient expressing interest obstruction with weight; and is distressed wanting something to help with weight loss.  Her BMI today is 24.58 and I discussed this as being in the normal range.  We could consider a GLP-1 agonist, but given normal BMI will have coverage issues unless wants to pay out-of-pocket.  She is holding on this for now    Chronic obstructive pulmonary disease, unspecified COPD type (H)   -Continues to smoke discussed quitting smoking, to slow down the progression of COPD.  She has nicotine patches, but running out needs a refill    Throat congestion    - Likely GERD related; will use NSAIDs sparingly, if persist will consider endoscopy    Wheezing  Likely from COPD  - albuterol (PROAIR HFA/PROVENTIL HFA/VENTOLIN HFA) 108 (90 Base) MCG/ACT inhaler  Dispense: 18 g; Refill: 1    Fibromyalgia  Been treated for a long time, she is concerned about the blood work from [place] and was told that she needed to see rheumatology.  - ibuprofen (ADVIL/MOTRIN) 800 MG tablet  Dispense: 90 tablet; Refill:  0  - cyclobenzaprine (FLEXERIL) 10 MG tablet  Dispense: 30 tablet; Refill: 1  - Adult Rheumatology  Referral    DDD (degenerative disc disease), cervical  - cyclobenzaprine (FLEXERIL) 10 MG tablet  Dispense: 30 tablet; Refill: 1    Degeneration of intervertebral disc of lumbar region with discogenic back pain and lower extremity pain  - cyclobenzaprine (FLEXERIL) 10 MG tablet  Dispense: 30 tablet; Refill: 1    Chronic bilateral low back pain without sciatica  - cyclobenzaprine (FLEXERIL) 10 MG tablet  Dispense: 30 tablet; Refill: 1    Attention deficit hyperactivity disorder (ADHD), predominantly inattentive type  Due for refill  - amphetamine-dextroamphetamine (ADDERALL) 20 MG tablet  Dispense: 60 tablet; Refill: 0  - amphetamine-dextroamphetamine (ADDERALL) 20 MG tablet  Dispense: 60 tablet; Refill: 0  - amphetamine-dextroamphetamine (ADDERALL) 20 MG tablet  Dispense: 30 tablet; Refill: 0    Flatulence, eructation, and gas pain  - polyethylene glycol (MIRALAX) 17 GM/Dose powder  Dispense: 527 g; Refill: 1    Encounter for screening mammogram for breast cancer  - MA Screening Bilateral w/ Abel  - MA Screen Bilateral w/Abel    Leukocytosis, unspecified type  Likely from smoking or inflammatory process  - CBC with platelets and differential  - CBC with platelets and differential    Tobacco use disorder  - nicotine (NICODERM CQ) 21 MG/24HR 24 hr patch  Dispense: 28 patch; Refill: 1    Hyperlipidemia LDL goal <100  - Lipid Profile (Chol, Trig, HDL, LDL calc)  - Lipid Profile (Chol, Trig, HDL, LDL calc)    Elevated antinuclear antibody (PAULETTE) level  Had evaluation in [place], found to have elevated PAULETTE, but mildly low was encouraged to see rheumatology wants to do the follow-up here.  - Adult Rheumatology  Referral          MED REC REQUIRED{  Post Medication Reconciliation Status: patient was not discharged from an inpatient facility or TCU  Nicotine/Tobacco Cessation  She reports that she has been  smoking cigarettes. She started smoking about 45 years ago. She has a 17.9 pack-year smoking history. She has never been exposed to tobacco smoke. She has never used smokeless tobacco.  Nicotine/Tobacco Cessation Plan  Self help information given to patient        See Patient Instructions    Subjective   Marlin is a 54 year old, presenting for the following health issues:  ED Follow Up         1/6/2025    10:48 AM   Additional Questions   Roomed by jaja Crisostomo ma   Accompanied by self     HPI     Pain in the Lt subcostal area x a while it is constant and feels like has lumps there.  This has been going on for a long time and been evaluated she is distressed about this, worried about cancer or something bad    2. Would a urine check: urine check and wet prep    3. Low blood sugar: has had some low blood sugars; and is worried about diabetes, though says sometimes has no appetite and is not eating much.    4. Osteoporosis: Patient reports that she has been told she has osteoporosis, but there is no evidence on the record about osteoporosis    5. Weight Loss: feeling like storing lots of fat, tries exercises, foot pain limits exercise.  She is looking into anything that can help with weight loss    6. Feel like food gets stick in throat: also has lots of phlegm    7. ADHD: needing refill of medications    Wt Readings from Last 4 Encounters:   01/06/25 77 kg (169 lb 12.8 oz)   10/08/21 75.8 kg (167 lb 3.2 oz)   02/03/21 78.9 kg (174 lb)   12/27/19 75.3 kg (166 lb)      8. Elevated PAULETTE      Has labs from [place], showed a borderline elevation of PAULETTE and was told to follow-up with rheumatology and wants to do this here      Seen in ER on 12/12/24 12/12/24  IMPRESSION and PLAN:  Marlin LAN is a 54 y.o. with medical history notable for fibromyalgia, depression presenting for evaluation of upper abdominal pain, cough. Differential considered but not limited to muscle strain, rib fracture, pneumothorax, gastritis, PUD,  "splenic infarct, hepatitis, cholecystitis, renal colic, UTI.    ED/UC Followup:  Facility:  [facility]   Date of visit: 12/12/2024  Reason for visit: Cough, unspecified type (Primary Dx);  Muscle pain  Discharge Disposition: Home Self Care   Current Status: wants to do urine sample and self swab for possible bladder infection   Wants lab work and sugars checked as well      Review of Systems  Constitutional, HEENT, cardiovascular, pulmonary, gi and gu systems are negative, except as otherwise noted.      Objective    BP (!) 152/95   Pulse 72   Temp 98.5  F (36.9  C) (Temporal)   Resp 19   Ht 1.77 m (5' 9.69\")   Wt 77 kg (169 lb 12.8 oz)   LMP 01/03/2014   SpO2 98%   BMI 24.58 kg/m    Body mass index is 24.58 kg/m .  Physical Exam   GENERAL: alert and showing evidence of emotional distress  NECK: no adenopathy, no asymmetry, masses, or scars  RESP: lungs clear to auscultation - no rales, rhonchi or wheezes  CV: regular rate and rhythm, no murmur, click or rub, no peripheral edema  ABDOMEN: soft, nontender, no masses and bowel sounds normal  MS: no gross musculoskeletal defects noted, no edema  NEURO: Normal strength and tone, mentation intact and speech normal  PSYCH: mentation appears normal, affect appears distressed      Signed Electronically by: Zach Corona MD    Answers submitted by the patient for this visit:  Patient Health Questionnaire (G7) (Submitted on 1/6/2025)  MARY BETH 7 TOTAL SCORE: 5    "

## 2025-01-07 RX ORDER — DEXTROAMPHETAMINE SACCHARATE, AMPHETAMINE ASPARTATE, DEXTROAMPHETAMINE SULFATE AND AMPHETAMINE SULFATE 5; 5; 5; 5 MG/1; MG/1; MG/1; MG/1
20 TABLET ORAL 2 TIMES DAILY
Qty: 30 TABLET | Refills: 0 | Status: SHIPPED | OUTPATIENT
Start: 2025-03-08 | End: 2025-05-07

## 2025-01-07 RX ORDER — IBUPROFEN 800 MG/1
800 TABLET, FILM COATED ORAL EVERY 8 HOURS PRN
Qty: 90 TABLET | Refills: 0 | Status: SHIPPED | OUTPATIENT
Start: 2025-01-07

## 2025-01-07 RX ORDER — NICOTINE 21 MG/24HR
1 PATCH, TRANSDERMAL 24 HOURS TRANSDERMAL EVERY 24 HOURS
Qty: 28 PATCH | Refills: 1 | Status: SHIPPED | OUTPATIENT
Start: 2025-01-07

## 2025-01-07 RX ORDER — FLUCONAZOLE 150 MG/1
150 TABLET ORAL ONCE
Qty: 1 TABLET | Refills: 0 | Status: SHIPPED | OUTPATIENT
Start: 2025-01-07 | End: 2025-01-07

## 2025-01-07 RX ORDER — DEXTROAMPHETAMINE SACCHARATE, AMPHETAMINE ASPARTATE, DEXTROAMPHETAMINE SULFATE AND AMPHETAMINE SULFATE 5; 5; 5; 5 MG/1; MG/1; MG/1; MG/1
20 TABLET ORAL 2 TIMES DAILY
Qty: 60 TABLET | Refills: 0 | Status: SHIPPED | OUTPATIENT
Start: 2025-02-06 | End: 2025-03-08

## 2025-01-07 RX ORDER — POLYETHYLENE GLYCOL 3350 17 G/17G
POWDER, FOR SOLUTION ORAL
Qty: 527 G | Refills: 1 | Status: SHIPPED | OUTPATIENT
Start: 2025-01-07

## 2025-01-07 RX ORDER — DEXTROAMPHETAMINE SACCHARATE, AMPHETAMINE ASPARTATE, DEXTROAMPHETAMINE SULFATE AND AMPHETAMINE SULFATE 5; 5; 5; 5 MG/1; MG/1; MG/1; MG/1
20 TABLET ORAL 2 TIMES DAILY
Qty: 60 TABLET | Refills: 0 | Status: SHIPPED | OUTPATIENT
Start: 2025-01-07 | End: 2025-02-06

## 2025-01-07 RX ORDER — CYCLOBENZAPRINE HCL 10 MG
10 TABLET ORAL 3 TIMES DAILY PRN
Qty: 30 TABLET | Refills: 1 | Status: SHIPPED | OUTPATIENT
Start: 2025-01-07

## 2025-01-07 RX ORDER — ALBUTEROL SULFATE 90 UG/1
2 INHALANT RESPIRATORY (INHALATION) EVERY 6 HOURS PRN
Qty: 18 G | Refills: 1 | Status: SHIPPED | OUTPATIENT
Start: 2025-01-07